# Patient Record
Sex: MALE | Race: WHITE
[De-identification: names, ages, dates, MRNs, and addresses within clinical notes are randomized per-mention and may not be internally consistent; named-entity substitution may affect disease eponyms.]

---

## 2018-06-06 ENCOUNTER — HOSPITAL ENCOUNTER (EMERGENCY)
Dept: HOSPITAL 97 - ER | Age: 53
Discharge: HOME | End: 2018-06-06
Payer: COMMERCIAL

## 2018-06-06 DIAGNOSIS — F17.210: ICD-10-CM

## 2018-06-06 DIAGNOSIS — M32.9: Primary | ICD-10-CM

## 2018-06-06 PROCEDURE — 99283 EMERGENCY DEPT VISIT LOW MDM: CPT

## 2018-06-06 NOTE — ER
Nurse's Notes                                                                                     

 [facility]                                                                

Name: Mack Ramírez                                                                                    

Age: 53 yrs                                                                                       

Sex: Male                                                                                         

: 1965                                                                                   

MRN: [id number]                                                                                   

Arrival Date: 2018                                                                          

Time: 12:21                                                                                       

Account#: [id number]                                                                            

Bed 26                                                                                            

Private MD: None, None                                                                            

Diagnosis: Lupus erythematosus                                                                    

                                                                                                  

Presentation:                                                                                     

                                                                                             

12:34 Presenting complaint: Patient states: Pain all over body with patches of dry skin to    aj  

      face and ears. Patient reports that he has been off of his RX steroids for lupus and is     

      having trouble getting into rheumatologist. Would also like a second opinion on             

      previously injured right upper arm and shoulder. Transition of care: patient was not        

      received from another setting of care. Onset of symptoms was May 29, 2018. Risk             

      Assessment: Do you want to hurt yourself or someone else? Patient reports no desire to      

      harm self or others. Care prior to arrival: None.                                           

12:34 Method Of Arrival: Ambulatory                                                             

12:34 Acuity: VERN 4                                                                           aj  

13:30 Initial Sepsis Screen: Does the patient meet any 2 criteria? No. Patient's initial      kr2 

      sepsis screen is negative. Does the patient have a suspected source of infection? No.       

      Patient's initial sepsis screen is negative.                                                

                                                                                                  

Triage Assessment:                                                                                

12:36 General: Appears in no apparent distress. comfortable, Behavior is calm, cooperative,   aj  

      appropriate for age. Pain:. Pain: Complains of pain in buttocks, pelvis, right leg and      

      left leg. Neuro: Level of Consciousness is awake, alert, obeys commands, Oriented to        

      person, place, time, situation, Appropriate for age. Respiratory: Airway is patent          

      Respiratory effort is even, unlabored, Respiratory pattern is regular, symmetrical.         

      Derm: Skin is pink, warm \T\ dry. normal, Rash noted that is macular, on face.              

                                                                                                  

Historical:                                                                                       

- Allergies:                                                                                      

12:36 No Known Allergies;                                                                     aj  

- Home Meds:                                                                                      

12:36 None [Active];                                                                          aj  

- PMHx:                                                                                           

12:36 Lupus;                                                                                  aj  

- PSHx:                                                                                           

12:36 RIGHT eye sx;                                                                           aj  

                                                                                                  

- Immunization history:: Adult Immunizations up to date.                                          

- Social history:: Smoking status: Patient uses tobacco products, smokes one-half pack            

  cigarettes per day.                                                                             

- Ebola Screening: : Patient negative for fever greater than or equal to 101.5 degrees            

  Fahrenheit, and additional compatible Ebola Virus Disease symptoms Patient denies               

  exposure to infectious person Patient denies travel to an Ebola-affected area in the            

  21 days before illness onset No symptoms or risks identified at this time.                      

- Family history:: not pertinent.                                                                 

- Hospitalizations: : No recent hospitalization is reported.                                      

                                                                                                  

                                                                                                  

Screenin:30 Abuse screen: Denies threats or abuse. Denies injuries from another. Nutritional        kr2 

      screening: No deficits noted. Tuberculosis screening: No symptoms or risk factors           

      identified. Fall Risk None identified.                                                      

                                                                                                  

Assessment:                                                                                       

13:30 General: Appears in no apparent distress. uncomfortable, well groomed, well developed,  kr2 

      well nourished, Behavior is calm, cooperative, appropriate for age. Pain: Complains of      

      pain in entire body Pain currently is 8 out of 10 on a pain scale. Quality of pain is       

      described as aching, Is continuous, Alleviated by nothing. Aggravated by increased          

      activity. Neuro: Level of Consciousness is awake, alert, obeys commands, Oriented to        

      person, place, time, situation, Appropriate for age Intact. Cardiovascular: Capillary       

      refill < 3 seconds in bilateral fingers Patient's skin is warm and dry. Respiratory:        

      Airway is patent Respiratory effort is even, unlabored, Respiratory pattern is regular,     

      symmetrical. GI: Abdomen is flat, non-distended. : No signs and/or symptoms were          

      reported regarding the genitourinary system. EENT: Oral mucosa is moist. Derm: Skin is      

      intact, is healthy with good turgor, Skin is pink, warm \T\ dry. Musculoskeletal:           

      Circulation, motion, and sensation intact. Range of motion: limited in right shoulder.      

                                                                                                  

Vital Signs:                                                                                      

12:36  / 93; Pulse 68; Resp 16; Temp 97.4; Pulse Ox 97% on R/A; Weight 106.59 kg;       aj  

      Height 6 ft. 1 in. (185.42 cm);                                                             

12:36 Body Mass Index 31.00 (106.59 kg, 185.42 cm)                                            aj  

                                                                                                  

ED Course:                                                                                        

12:21 Patient arrived in ED.                                                                  sb2 

12:22 None, None is Private Physician.                                                        sb2 

12:36 Triage completed.                                                                       aj  

12:36 Arm band placed on left wrist. Patient placed in waiting room, Patient notified of wait aj  

      time.                                                                                       

13:28 Sharri Phoenix, RODDY is Primary Nurse.                                                     kr2 

13:28 Rome Zaidi MD is Attending Physician.                                                rn  

13:30 Patient has correct armband on for positive identification. Bed in low position. Call   kr2 

      light in reach. Side rails up X 1. Pulse ox on. NIBP on. Door closed. Head of bed           

      elevated.                                                                                   

14:00 No provider procedures requiring assistance completed. Patient did not have IV access   kr2 

      during this emergency room visit.                                                           

                                                                                                  

Administered Medications:                                                                         

No medications were administered                                                                  

                                                                                                  

                                                                                                  

Outcome:                                                                                          

13:49 Discharge ordered by MD.                                                                rn  

14:00 Discharged to home ambulatory.                                                          kr2 

14:00 Condition: good                                                                             

14:00 Discharge instructions given to patient, Instructed on discharge instructions, follow       

      up and referral plans. medication usage, Demonstrated understanding of instructions,        

      follow-up care, medications, Prescriptions given X 2.                                       

14:13 Patient left the ED.                                                                    kr2 

                                                                                                  

Signatures:                                                                                       

Brianne Perez RN                       Rome John MD MD rn Reaves, Karey, RN                       RN   kr2                                                  

Teri Finley                               sb2                                                  

                                                                                                  

**************************************************************************************************

## 2018-06-06 NOTE — EDPHYS
Physician Documentation                                                                           

 [facility]                                                                

Name: Mack Ramírez                                                                                    

Age: 53 yrs                                                                                       

Sex: Male                                                                                         

: 1965                                                                                   

MRN: [id number]                                                                                   

Arrival Date: 2018                                                                          

Time: 12:21                                                                                       

Account#: [id number]                                                                            

Bed 26                                                                                            

Private MD: None, None                                                                            

ED Physician Rome Zaidi                                                                         

HPI:                                                                                              

                                                                                             

13:45 This 53 yrs old  Male presents to ER via Ambulatory with complaints of Pain    rn  

      All Over.                                                                                   

13:45 Reports recently ran out of his steroids for his lupus, broke out in his typical lupus  rn  

      rash recently as well as pain in all joints, requesting refill of prednisone. No other      

      acute complaints. . Onset: The symptoms/episode began/occurred at an unknown time.          

      Severity of symptoms: At their worst the symptoms were mild in the emergency department     

      the symptoms are unchanged. The patient has experienced similar episodes in the past.       

      The patient has not recently seen a physician.                                              

                                                                                                  

Historical:                                                                                       

- Allergies:                                                                                      

12:36 No Known Allergies;                                                                     aj  

- Home Meds:                                                                                      

12:36 None [Active];                                                                          aj  

- PMHx:                                                                                           

12:36 Lupus;                                                                                  aj  

- PSHx:                                                                                           

12:36 RIGHT eye sx;                                                                           aj  

                                                                                                  

- Immunization history:: Adult Immunizations up to date.                                          

- Social history:: Smoking status: Patient uses tobacco products, smokes one-half pack            

  cigarettes per day.                                                                             

- Ebola Screening: : Patient negative for fever greater than or equal to 101.5 degrees            

  Fahrenheit, and additional compatible Ebola Virus Disease symptoms Patient denies               

  exposure to infectious person Patient denies travel to an Ebola-affected area in the            

  21 days before illness onset No symptoms or risks identified at this time.                      

- Family history:: not pertinent.                                                                 

- Hospitalizations: : No recent hospitalization is reported.                                      

                                                                                                  

                                                                                                  

ROS:                                                                                              

13:47 Constitutional: Negative for fever, chills, and weight loss, Eyes: Negative for injury, rn  

      pain, redness, and discharge, Neck: Negative for injury, pain, and swelling,                

      Cardiovascular: Negative for chest pain, palpitations, and edema, Respiratory: Negative     

      for shortness of breath, cough, wheezing, and pleuritic chest pain, Abdomen/GI:             

      Negative for abdominal pain, nausea, vomiting, diarrhea, and constipation,                  

      MS/Extremity: generalized joint aches Skin: + generalized facial and truncal rash           

                                                                                                  

Exam:                                                                                             

13:47 Constitutional:  This is a well developed, well nourished patient who is awake, alert,  rn  

      and in no acute distress. Head/Face:  Normocephalic, atraumatic. Skin:  generalized         

      patchy dry rash over face/neck/torso with base of erythema, no fluctuance, no signs of      

      cellulitis MS/ Extremity:  Pulses equal, no cyanosis.  Neurovascular intact.  Full,         

      normal range of motion.  Equal circumference.                                               

                                                                                                  

Vital Signs:                                                                                      

12:36  / 93; Pulse 68; Resp 16; Temp 97.4; Pulse Ox 97% on R/A; Weight 106.59 kg;       aj  

      Height 6 ft. 1 in. (185.42 cm);                                                             

12:36 Body Mass Index 31.00 (106.59 kg, 185.42 cm)                                            aj  

                                                                                                  

MDM:                                                                                              

13:28 Patient medically screened.                                                             rn  

13:47 Differential Diagnosis lupus flare. Data reviewed: vital signs, nurses notes, and as a  rn  

      result, I will discharge patient. Counseling: I had a detailed discussion with the          

      patient and/or guardian regarding: the historical points, exam findings, and any            

      diagnostic results supporting the discharge/admit diagnosis, the need for outpatient        

      follow up, to return to the emergency department if symptoms worsen or persist or if        

      there are any questions or concerns that arise at home. Special discussion: I discussed     

      with the patient/guardian in detail that at this point there is no indication for           

      admission to the hospital. It is understood, however, that if the symptoms persist or       

      worsen the patient needs to return immediately for re-evaluation. Based on the history      

      and exam findings, there is no indication for further emergent testing or inpatient         

      evaluation. I discussed with the patient/guardian the need to see the rheumatologist        

      for further evaluation of the symptoms.                                                     

                                                                                                  

Administered Medications:                                                                         

No medications were administered                                                                  

                                                                                                  

                                                                                                  

Disposition:                                                                                      

18 13:49 Discharged to Home. Impression: Lupus erythematosus.                               

- Condition is Stable.                                                                            

                                                                                                  

- Prescriptions for prednisone 10 mg Oral tablet - take 1 tablet by ORAL route once               

  daily; 60 tablet. Triamcinolone Acetonide 0.1 % Topical Ointment - apply 1                      

  application by TOPICAL route every 12 hours As needed; 1 tube.                                  

- Medication Reconciliation Form, Thank You Letter, Antibiotic Education, Prescription            

  Opioid Use form.                                                                                

- Follow up: Private Physician; When: As needed; Reason: Recheck today's complaints,              

  Re-evaluation by your physician.                                                                

- Problem is an ongoing problem.                                                                  

- Symptoms are unchanged.                                                                         

                                                                                                  

                                                                                                  

                                                                                                  

Signatures:                                                                                       

Brianne Perez RN RN aj Nieto, Roman, MD MD rn Reaves, Sharri, RN                       RN   kr2                                                  

                                                                                                  

Corrections: (The following items were deleted from the chart)                                    

14:13 13:49 2018 13:49 Discharged to Home. Impression: Lupus erythematosus. Condition   kr2 

      is Stable. Prescriptions for prednisone 10 mg Oral tablet - take 1 tablet by ORAL route     

      once daily; 60 tablet, Triamcinolone Acetonide 0.1 % Topical Ointment - apply 1             

      application by TOPICAL route every 12 hours As needed; 1 tube. and Forms are Medication     

      Reconciliation Form, Thank You Letter, Antibiotic Education, Prescription Opioid Use.       

      Follow up: Private Physician; When: As needed; Reason: Recheck today's complaints,          

      Re-evaluation by your physician. Problem is an ongoing problem. Symptoms are unchanged.     

      rn                                                                                          

                                                                                                  

**************************************************************************************************

## 2018-08-14 ENCOUNTER — HOSPITAL ENCOUNTER (EMERGENCY)
Dept: HOSPITAL 97 - ER | Age: 53
Discharge: HOME | End: 2018-08-14
Payer: SELF-PAY

## 2018-08-14 DIAGNOSIS — F17.210: ICD-10-CM

## 2018-08-14 DIAGNOSIS — Z76.0: Primary | ICD-10-CM

## 2018-08-14 PROCEDURE — 99282 EMERGENCY DEPT VISIT SF MDM: CPT

## 2018-08-14 NOTE — ER
Nurse's Notes                                                                                     

 [facility]                                                                

Name: Mack Ramírez                                                                                    

Age: 53 yrs                                                                                       

Sex: Male                                                                                         

: 1965                                                                                   

MRN: [id number]                                                                                   

Arrival Date: 2018                                                                          

Time: 08:34                                                                                       

Account#: [id number]                                                                            

Bed 19                                                                                            

Private MD: None, None                                                                            

Diagnosis: Encounter for issue of repeat prescription                                             

                                                                                                  

Presentation:                                                                                     

                                                                                             

08:40 Presenting complaint: Patient states: I HAVE LUPUS, NO PCP, AND I CANNOT AFFORD TO SEE    

      MY DOCTOR. I HAVE BEEN OUT OF PREDNISONE FOR THE PAST 4 DAYS. I TAKE 10 MG ONCE DAILY.      

      Transition of care: patient was not received from another setting of care. Onset of         

      symptoms was August 10, 2018. Risk Assessment: Do you want to hurt yourself or someone      

      else? Patient reports no desire to harm self or others. Initial Sepsis Screen: Does the     

      patient meet any 2 criteria? No. Patient's initial sepsis screen is negative. Does the      

      patient have a suspected source of infection? No. Patient's initial sepsis screen is        

      negative. Care prior to arrival: None.                                                      

08:40 Method Of Arrival: Ambulatory                                                             

08:40 Acuity: VERN 5                                                                             

                                                                                                  

Triage Assessment:                                                                                

08:45 General: Appears in no apparent distress. comfortable, Behavior is calm, cooperative,   ch  

      appropriate for age. Pain: Pain began PT HAS CHRONIC PAIN ISSUES, NO NEW OR ACUTE PAIN.     

      Neuro: No deficits noted. Respiratory: No deficits noted.                                   

                                                                                                  

Historical:                                                                                       

- Allergies:                                                                                      

08:45 No Known Allergies;                                                                     ch  

- Home Meds:                                                                                      

08:45 prednisone 10 mg Oral tab once daily [Active];                                            

- PMHx:                                                                                           

08:45 Lupus; LOW BACK PAIN; r ARM FX-LOCKED R SHOULDER;                                         

- PSHx:                                                                                           

08:45 None;                                                                                     

                                                                                                  

- Immunization history:: Adult Immunizations up to date.                                          

- Social history:: Smoking status: Patient uses tobacco products, smokes one-half pack            

  cigarettes per day.                                                                             

- Ebola Screening: : Patient negative for fever greater than or equal to 101.5 degrees            

  Fahrenheit, and additional compatible Ebola Virus Disease symptoms Patient denies               

  exposure to infectious person Patient denies travel to an Ebola-affected area in the            

  21 days before illness onset No symptoms or risks identified at this time.                      

                                                                                                  

                                                                                                  

Screenin:46 Abuse screen: Denies threats or abuse. Denies injuries from another. Nutritional        ch  

      screening: No deficits noted. Tuberculosis screening: No symptoms or risk factors           

      identified. Fall Risk None identified.                                                      

                                                                                                  

Assessment:                                                                                       

08:46 Reassessment: Patient appears in no apparent distress at this time. Patient and/or      ch  

      family updated on plan of care and expected duration. Pain level reassessed. Patient is     

      alert, oriented x 3, equal unlabored respirations, skin warm/dry/pink.                      

                                                                                                  

Vital Signs:                                                                                      

08:45  / 105; Pulse 73; Resp 15; Temp 98.3; Pulse Ox 99% on R/A; Weight 108.86 kg;      ch  

      Height 6 ft. 1 in. (185.42 cm);                                                             

08:45 Body Mass Index 31.66 (108.86 kg, 185.42 cm)                                              

                                                                                                  

ED Course:                                                                                        

08:34 Patient arrived in ED.                                                                  mr  

08:34 None, None is Private Physician.                                                        mr  

08:36 Mitch Bello MD is Attending Physician.                                             [facility] 

08:36 Dolores Muse FNP-C is PHCP.                                                        kb  

08:39 Dolores Muse FNP-C is PHCP.                                                        sg  

08:40 Mitch Bello MD is Attending Physician.                                               

08:40 Tri Song, RN is Primary Nurse.                                                  

08:44 Triage completed.                                                                         

08:45 Arm band placed on left wrist. Patient placed in an exam room, on a stretcher.          ch  

08:46 No apparent distress.                                                                     

08:46 Patient has correct armband on for positive identification.                               

08:46 No provider procedures requiring assistance completed. Patient did not have IV access   ch  

      during this emergency room visit.                                                           

                                                                                                  

Administered Medications:                                                                         

No medications were administered                                                                  

                                                                                                  

                                                                                                  

Outcome:                                                                                          

08:43 Discharge ordered by MD.                                                                kb  

08:46 Discharged to home ambulatory.                                                            

08:46 Condition: stable                                                                           

08:46 Discharge instructions given to patient, Instructed on discharge instructions, follow       

      up and referral plans. medication usage, Demonstrated understanding of instructions,        

      follow-up care, medications, Prescriptions given X 1.                                       

08:50 Patient left the ED.                                                                      

                                                                                                  

Signatures:                                                                                       

Dolores Muse FNP-C FNP-Ckb Hammond, Christina RN                  RN                                                      

Rachid Victoria RN                         RN   Mitch Hernandez MD MD cha Rivera, Maria                                mr                                                   

                                                                                                  

**************************************************************************************************

## 2018-08-14 NOTE — EDPHYS
Physician Documentation                                                                           

 [facility]                                                                

Name: Mack Ramírez                                                                                    

Age: 53 yrs                                                                                       

Sex: Male                                                                                         

: 1965                                                                                   

MRN: [id number]                                                                                   

Arrival Date: 2018                                                                          

Time: 08:34                                                                                       

Account#: [id number]                                                                            

Bed 19                                                                                            

Private MD: None, None                                                                            

ED Physician Mitch Bello                                                                      

HPI:                                                                                              

                                                                                             

08:45 This 53 yrs old  Male presents to ER via Ambulatory with complaints of         kb  

      Medication Refill.                                                                          

08:45 The patient presents to the emergency department requesting refill(s) for: prednisone.  kb  

      The patient chronically suffers from Lupus. The patient has experienced similar             

      episodes in the past. The patient has not recently seen a physician. Pt states he has       

      been on prednisone 10mg daily for 6 years. Has been out for 4 days and needs a refill.      

      Cannot get in to see rheumatologist until October. Reports rash and joint pain that         

      normally happen when he doesn't take the prednisone. .                                      

                                                                                                  

Historical:                                                                                       

- Allergies:                                                                                      

08:45 No Known Allergies;                                                                     ch  

- Home Meds:                                                                                      

08:45 prednisone 10 mg Oral tab once daily [Active];                                          ch  

- PMHx:                                                                                           

08:45 Lupus; LOW BACK PAIN; r ARM FX-LOCKED R SHOULDER;                                       ch  

- PSHx:                                                                                           

08:45 None;                                                                                   ch  

                                                                                                  

- Immunization history:: Adult Immunizations up to date.                                          

- Social history:: Smoking status: Patient uses tobacco products, smokes one-half pack            

  cigarettes per day.                                                                             

- Ebola Screening: : Patient negative for fever greater than or equal to 101.5 degrees            

  Fahrenheit, and additional compatible Ebola Virus Disease symptoms Patient denies               

  exposure to infectious person Patient denies travel to an Ebola-affected area in the            

  21 days before illness onset No symptoms or risks identified at this time.                      

                                                                                                  

                                                                                                  

ROS:                                                                                              

08:45 Constitutional: Negative for fever, chills, and weight loss, Cardiovascular: Negative   kb  

      for chest pain, palpitations, and edema, Respiratory: Negative for shortness of breath,     

      cough, wheezing, and pleuritic chest pain, Abdomen/GI: Negative for abdominal pain,         

      nausea, vomiting, diarrhea, and constipation, Back: Negative for injury and pain,           

      Neuro: Negative for headache, weakness, numbness, tingling, and seizure.                    

08:45 MS/extremity: Positive for pain, all joints.                                                

08:45 Skin: Positive for rash, diffusely.                                                         

                                                                                                  

Exam:                                                                                             

08:45 Constitutional:  This is a well developed, well nourished patient who is awake, alert,  kb  

      and in no acute distress. Head/Face:  Normocephalic, atraumatic. Chest/axilla:  Normal      

      chest wall appearance and motion.  Nontender with no deformity.  No lesions are             

      appreciated. Cardiovascular:  Regular rate and rhythm with a normal S1 and S2.  No          

      gallops, murmurs, or rubs.  Normal PMI, no JVD.  No pulse deficits. Respiratory:  Lungs     

      have equal breath sounds bilaterally, clear to auscultation and percussion.  No rales,      

      rhonchi or wheezes noted.  No increased work of breathing, no retractions or nasal          

      flaring. Abdomen/GI:  Soft, non-tender, with normal bowel sounds.  No distension or         

      tympany.  No guarding or rebound.  No evidence of tenderness throughout. MS/ Extremity:     

       Pulses equal, no cyanosis.  Neurovascular intact.  Full, normal range of motion.           

      Neuro:  Awake and alert, GCS 15, oriented to person, place, time, and situation.            

      Cranial nerves II-XII grossly intact.  Motor strength 5/5 in all extremities.  Sensory      

      grossly intact.  Cerebellar exam normal.  Normal gait.                                      

08:45 Skin: rash a moderate rash is noted, rash can be described as erythematous, scaly, and      

      is diffusely located.                                                                       

                                                                                                  

Vital Signs:                                                                                      

08:45  / 105; Pulse 73; Resp 15; Temp 98.3; Pulse Ox 99% on R/A; Weight 108.86 kg;      ch  

      Height 6 ft. 1 in. (185.42 cm);                                                             

08:45 Body Mass Index 31.66 (108.86 kg, 185.42 cm)                                            ch  

                                                                                                  

MDM:                                                                                              

08:36 Patient medically screened.                                                             [facility] 

08:49 Data reviewed: vital signs, nurses notes. Data interpreted: Pulse oximetry: on room air kb  

      is 99 %. Interpretation: normal. Counseling: I had a detailed discussion with the           

      patient and/or guardian regarding: the historical points, exam findings, and any            

      diagnostic results supporting the discharge/admit diagnosis, the need for outpatient        

      follow up, a family practitioner, a rheumatologist, to return to the emergency              

      department if symptoms worsen or persist or if there are any questions or concerns that     

      arise at home.                                                                              

                                                                                                  

Administered Medications:                                                                         

No medications were administered                                                                  

                                                                                                  

                                                                                                  

Disposition:                                                                                      

11:42 Co-signature as Attending Physician, Mitch Bello MD I agree with the assessment and  donnell 

      plan of care.                                                                               

                                                                                                  

Disposition:                                                                                      

18 08:43 Discharged to Home. Impression: Encounter for issue of repeat prescription.        

- Condition is Stable.                                                                            

- Discharge Instructions: Medicine Refill at the Emergency Department.                            

- Prescriptions for prednisone 10 mg Oral tablet - take 1 tablet by ORAL route once               

  daily; 30 tablet.                                                                               

- Medication Reconciliation Form, Thank You Letter, Antibiotic Education, Prescription            

  Opioid Use form.                                                                                

- Follow up: Emergency Department; When: As needed; Reason: Worsening of condition.               

  Follow up: Private Physician; When: 2 - 3 days; Reason: Recheck today's complaints,             

  Continuance of care, Re-evaluation by your physician.                                           

                                                                                                  

                                                                                                  

                                                                                                  

Signatures:                                                                                       

Dolores Muse, ILEANA-C                 ILEANA-Tri Flores RN RN ch Anderson, Corey, MD MD cha                                                  

                                                                                                  

Corrections: (The following items were deleted from the chart)                                    

08:50 08:43 2018 08:43 Discharged to Home. Impression: Encounter for issue of repeat    ch  

      prescription. Condition is Stable. Forms are Medication Reconciliation Form, Thank You      

      Letter, Antibiotic Education, Prescription Opioid Use. Follow up: Emergency Department;     

      When: As needed; Reason: Worsening of condition. Follow up: Private Physician; When: 2      

      - 3 days; Reason: Recheck today's complaints, Continuance of care, Re-evaluation by         

      your physician. kb                                                                          

                                                                                                  

**************************************************************************************************

## 2018-10-16 ENCOUNTER — HOSPITAL ENCOUNTER (EMERGENCY)
Dept: HOSPITAL 97 - ER | Age: 53
Discharge: HOME | End: 2018-10-16
Payer: SELF-PAY

## 2018-10-16 DIAGNOSIS — K42.9: Primary | ICD-10-CM

## 2018-10-16 DIAGNOSIS — F17.210: ICD-10-CM

## 2018-10-16 PROCEDURE — 99281 EMR DPT VST MAYX REQ PHY/QHP: CPT

## 2018-10-16 NOTE — EDPHYS
Physician Documentation                                                                           

 [facility]                                                                

Name: Mack Ramírez                                                                                    

Age: 53 yrs                                                                                       

Sex: Male                                                                                         

: 1965                                                                                   

MRN: [id number]                                                                                   

Arrival Date: 10/16/2018                                                                          

Time: 09:53                                                                                       

Account#: [id number]                                                                            

Bed 24                                                                                            

Private MD:                                                                                       

ED Physician Rome Zaidi                                                                         

HPI:                                                                                              

10/16                                                                                             

10:31 This 53 yrs old  Male presents to ER via Ambulatory with complaints of         rn  

      Abdominal Pain - Hernia Enlarged.                                                           

10:31 The patient presents with enlarged hernia. Onset: The symptoms/episode began/occurred   rn  

      at an unknown time. The symptoms do not radiate. Associated signs and symptoms:             

      Pertinent negatives: nausea and vomiting, anorexia, blood in stools, chest pain,            

      diarrhea, dysuria, fever, hematuria, vomiting. The symptoms are described as achy.          

      Modifying factors: The symptoms are alleviated by nothing, the symptoms are aggravated      

      by nothing. Severity of pain: At its worst the pain was very mild in the emergency          

      department the pain is unchanged. The patient has experienced similar episodes in the       

      past. Reports noticed in last few weeks that existing hernia has enlarged, reports          

      noticing enlargement with straining and lifting, intermittent, no bowel complaints, no      

      bloody bowel movements, no current abd pain, states hernia currently pushed in, came in     

      at recommendation of his  because is in middle of lawsuit since got hernia while      

      at work. No vomiting. .                                                                     

                                                                                                  

Historical:                                                                                       

- Allergies:                                                                                      

10: No Known Allergies;                                                                     ch  

- Home Meds:                                                                                      

10:01 prednisone 10 mg Oral tab once daily [Active];                                          ch  

- PMHx:                                                                                           

10:01 Lupus; low back pain; r ARM FX-LOCKED R SHOULDER; Umbilical hernia;                       

- PSHx:                                                                                           

10:01 eye;                                                                                    ch  

                                                                                                  

- Immunization history:: Adult Immunizations up to date, Flu vaccine is not up to date.           

- Social history:: Smoking status: Patient uses tobacco products, smokes one pack                 

  cigarettes per day.                                                                             

- Ebola Screening: : Patient negative for fever greater than or equal to 101.5 degrees            

  Fahrenheit, and additional compatible Ebola Virus Disease symptoms Patient denies               

  exposure to infectious person Patient denies travel to an Ebola-affected area in the            

  21 days before illness onset No symptoms or risks identified at this time.                      

- Family history:: not pertinent.                                                                 

- Hospitalizations: : No recent hospitalization is reported.                                      

                                                                                                  

                                                                                                  

ROS:                                                                                              

10:31 Constitutional: Negative for fever, chills, and weight loss, Eyes: Negative for injury, rn  

      pain, redness, and discharge, Cardiovascular: Negative for chest pain, palpitations,        

      and edema, Respiratory: Negative for shortness of breath, cough, wheezing, and              

      pleuritic chest pain, Abdomen/GI: Negative for nausea, vomiting, diarrhea, and              

      constipation, Back: Negative for injury and pain, MS/Extremity: Negative for injury and     

      deformity, Skin: Negative for injury, rash, and discoloration, Neuro: Negative for          

      headache, weakness, numbness, tingling, and seizure.                                        

                                                                                                  

Exam:                                                                                             

10:31 Constitutional:  This is a well developed, well nourished patient who is awake, alert,  rn  

      and in no acute distress. Walked to room without difficulty. Abdomen/GI:  soft,             

      non-tender, + easily reducible small renate-umbilical hernia, no skin changes, no             

      rebound/guarding.                                                                           

                                                                                                  

Vital Signs:                                                                                      

10:01  / 98; Pulse 61; Resp 14; Temp 97.4; Pulse Ox 99% on R/A; Weight 102.06 kg;       ch  

      Height 6 ft. 2 in. (187.96 cm); Pain 210;                                                  

10:01 Body Mass Index 28.89 (102.06 kg, 187.96 cm)                                            ch  

                                                                                                  

MDM:                                                                                              

10:04 Patient medically screened.                                                             rn  

10:31 Differential diagnosis: hernia. Data reviewed: vital signs, nurses notes, and as a      rn  

      result, I will discharge patient. Counseling: I had a detailed discussion with the          

      patient and/or guardian regarding: the historical points, exam findings, and any            

      diagnostic results supporting the discharge/admit diagnosis, the need for outpatient        

      follow up, to return to the emergency department if symptoms worsen or persist or if        

      there are any questions or concerns that arise at home. Special discussion: I discussed     

      with the patient/guardian in detail that at this point there is no indication for           

      admission to the hospital. It is understood, however, that if the symptoms persist or       

      worsen the patient needs to return immediately for re-evaluation. Based on the history      

      and exam findings, there is no indication for further emergent testing or inpatient         

      evaluation. I discussed with the patient/guardian the need to see the general surgeon       

      for further evaluation of the symptoms. ED course: Pt with uncomplicated reducible          

      hernia, no signs/symptoms of obstruction/strangulation/incarceration, will dc home with     

      surgery f/u as needed. .                                                                    

                                                                                                  

Administered Medications:                                                                         

No medications were administered                                                                  

                                                                                                  

                                                                                                  

Disposition:                                                                                      

10/16/18 10:41 Discharged to Home. Impression: Umbilical hernia without obstruction or            

  gangrene.                                                                                       

- Condition is Stable.                                                                            

- Discharge Instructions: Hernia, Adult.                                                          

                                                                                                  

- Medication Reconciliation Form, Thank You Letter, Antibiotic Education, Prescription            

  Opioid Use form.                                                                                

- Follow up: Private Physician; When: As needed; Reason: Recheck today's complaints,              

  Re-evaluation by your physician.                                                                

- Problem is chronic.                                                                             

- Symptoms have improved.                                                                         

                                                                                                  

                                                                                                  

                                                                                                  

Signatures:                                                                                       

Tri Song RN RN                                                      

Rome Zaidi MD MD rn Baxter, Heather, RN RN                                                      

                                                                                                  

Corrections: (The following items were deleted from the chart)                                    

10:46 10:41 10/16/2018 10:41 Discharged to Home. Impression: Umbilical hernia without         hb  

      obstruction or gangrene. Condition is Stable. Forms are Medication Reconciliation Form,     

      Thank You Letter, Antibiotic Education, Prescription Opioid Use. Follow up: Private         

      Physician; When: As needed; Reason: Recheck today's complaints, Re-evaluation by your       

      physician. Problem is chronic. Symptoms have improved. rn                                   

                                                                                                  

**************************************************************************************************

## 2018-10-16 NOTE — ER
Nurse's Notes                                                                                     

 [facility]                                                                

Name: Mack Ramírez                                                                                    

Age: 53 yrs                                                                                       

Sex: Male                                                                                         

: 1965                                                                                   

MRN: [id number]                                                                                   

Arrival Date: 10/16/2018                                                                          

Time: 09:53                                                                                       

Account#: [id number]                                                                            

Bed 24                                                                                            

Private MD:                                                                                       

Diagnosis: Umbilical hernia without obstruction or gangrene                                       

                                                                                                  

Presentation:                                                                                     

10/16                                                                                             

09:58 Presenting complaint: Patient states: I got a hernia years ago at work but they didn't  ch  

      treat it so I hired a . since  it has gotten larger, and I feel burning in      

      my L upper abdomen. When I initially did this, the sent me to [employer]. The          

      company was supposed to settle with me on Thursday so I could go get surgery on it. My      

      concern is I do lots of heavy lifting, am I safe to work? Do i need to be on light          

      Duty? Can they stitch it up because its small. Transition of care: patient was not          

      received from another setting of care. Onset of symptoms was 2018. Risk         

      Assessment: Do you want to hurt yourself or someone else? Patient reports no desire to      

      harm self or others. Initial Sepsis Screen: Does the patient meet any 2 criteria? No.       

      Patient's initial sepsis screen is negative. Does the patient have a suspected source       

      of infection? No. Patient's initial sepsis screen is negative. Care prior to arrival:       

      None.                                                                                       

09:58 Method Of Arrival: Ambulatory                                                             

09:58 Acuity: VERN 3                                                                             

                                                                                                  

Triage Assessment:                                                                                

10:01 General: Appears in no apparent distress. uncomfortable, Behavior is calm, cooperative, ch  

      appropriate for age. Pain: Complains of pain in umbilical area and left upper quadrant      

      Pain currently is 2 out of 10 on a pain scale.                                              

                                                                                                  

Historical:                                                                                       

- Allergies:                                                                                      

10: No Known Allergies;                                                                     ch  

- Home Meds:                                                                                      

10: prednisone 10 mg Oral tab once daily [Active];                                            

- PMHx:                                                                                           

10: Lupus; low back pain; r ARM FX-LOCKED R SHOULDER; Umbilical hernia;                       

- PSHx:                                                                                           

10:01 eye;                                                                                      

                                                                                                  

- Immunization history:: Adult Immunizations up to date, Flu vaccine is not up to date.           

- Social history:: Smoking status: Patient uses tobacco products, smokes one pack                 

  cigarettes per day.                                                                             

- Ebola Screening: : Patient negative for fever greater than or equal to 101.5 degrees            

  Fahrenheit, and additional compatible Ebola Virus Disease symptoms Patient denies               

  exposure to infectious person Patient denies travel to an Ebola-affected area in the            

  21 days before illness onset No symptoms or risks identified at this time.                      

- Family history:: not pertinent.                                                                 

- Hospitalizations: : No recent hospitalization is reported.                                      

                                                                                                  

                                                                                                  

Screening:                                                                                        

10:12 Abuse screen: Denies threats or abuse. Denies injuries from another. Nutritional        aj1 

      screening: No deficits noted. Tuberculosis screening: No symptoms or risk factors           

      identified. Fall Risk None identified.                                                      

                                                                                                  

Assessment:                                                                                       

10:12 General: Appears in no apparent distress. comfortable, Behavior is calm, cooperative,   aj1 

      appropriate for age. Pain: Complains of pain in umbilical area Pain does not radiate.       

      Pain currently is 2 out of 10 on a pain scale. Quality of pain is described as burning.     

      Neuro: Level of Consciousness is awake, alert, obeys commands. Cardiovascular:              

      Patient's skin is warm and dry. Respiratory: Airway is patent Respiratory effort is         

      even, unlabored, Respiratory pattern is regular, symmetrical. GI: Abdomen is round          

      Bowel sounds present X 4 quads. Abd is soft and non tender X 4 quads. Reports normal        

      bowel habits, Patient currently denies bloody stool. : No signs and/or symptoms were      

      reported regarding the genitourinary system. EENT: No signs and/or symptoms were            

      reported regarding the EENT system. Derm: No signs and/or symptoms reported regarding       

      the dermatologic system. Skin is pink, warm \T\ dry. normal. Musculoskeletal: No signs      

      and/or symptoms reported regarding the musculoskeletal system. Circulation, motion, and     

      sensation intact.                                                                           

                                                                                                  

Vital Signs:                                                                                      

10:01  / 98; Pulse 61; Resp 14; Temp 97.4; Pulse Ox 99% on R/A; Weight 102.06 kg;       ch  

      Height 6 ft. 2 in. (187.96 cm); Pain 2/10;                                                  

10:01 Body Mass Index 28.89 (102.06 kg, 187.96 cm)                                              

                                                                                                  

ED Course:                                                                                        

09:53 Patient arrived in ED.                                                                  as  

10:00 Triage completed.                                                                         

10:01 Arm band placed on left wrist. Patient placed in an exam room, on a stretcher.          ch  

10:03 Valerie Casper RN is Primary Nurse.                                                   aj1 

10:04 Rome Zaidi MD is Attending Physician.                                                rn  

10:05 Mitch Caro PA is PHCP.                                                                cp  

10:12 Patient has correct armband on for positive identification. Bed in low position. Call   aj1 

      light in reach. Side rails up X 1.                                                          

10:12 No provider procedures requiring assistance completed.                                  aj1 

10:46 Patient did not have IV access during this emergency room visit.                        hb  

                                                                                                  

Administered Medications:                                                                         

No medications were administered                                                                  

                                                                                                  

                                                                                                  

Outcome:                                                                                          

10:41 Discharge ordered by MD.                                                                rn  

10:46 Discharged to home ambulatory.                                                          hb  

10:46 Condition: stable                                                                           

10:46 Discharge instructions given to patient, Instructed on discharge instructions, follow       

      up and referral plans. Demonstrated understanding of instructions, follow-up care.          

10:46 Patient left the ED.                                                                    hb  

                                                                                                  

Signatures:                                                                                       

Tri Song, RN                  RN   Valerie Lundy RN                     RN   aj1                                                  

Marisa Holder Roman, MD MD rn Page, Corey, PA PA cp Baxter, Heather, RN                     RN   hb                                                   

                                                                                                  

**************************************************************************************************

## 2018-10-27 ENCOUNTER — HOSPITAL ENCOUNTER (EMERGENCY)
Dept: HOSPITAL 97 - ER | Age: 53
Discharge: HOME | End: 2018-10-27
Payer: SELF-PAY

## 2018-10-27 DIAGNOSIS — F17.210: ICD-10-CM

## 2018-10-27 DIAGNOSIS — L03.115: Primary | ICD-10-CM

## 2018-10-27 LAB
BLD SMEAR INTERP: (no result)
BUN BLD-MCNC: 12 MG/DL (ref 7–18)
GLUCOSE SERPLBLD-MCNC: 83 MG/DL (ref 74–106)
HCT VFR BLD CALC: 42.5 % (ref 39.6–49)
LYMPHOCYTES # SPEC AUTO: 0.4 K/UL (ref 0.7–4.9)
MCH RBC QN AUTO: 31.6 PG (ref 27–35)
MCV RBC: 94.8 FL (ref 80–100)
MORPHOLOGY BLD-IMP: (no result)
PMV BLD: 10.3 FL (ref 7.6–11.3)
POTASSIUM SERPL-SCNC: 4 MMOL/L (ref 3.5–5.1)
RBC # BLD: 4.48 M/UL (ref 4.33–5.43)

## 2018-10-27 PROCEDURE — 36415 COLL VENOUS BLD VENIPUNCTURE: CPT

## 2018-10-27 PROCEDURE — 96374 THER/PROPH/DIAG INJ IV PUSH: CPT

## 2018-10-27 PROCEDURE — 93971 EXTREMITY STUDY: CPT

## 2018-10-27 PROCEDURE — 87040 BLOOD CULTURE FOR BACTERIA: CPT

## 2018-10-27 PROCEDURE — 85025 COMPLETE CBC W/AUTO DIFF WBC: CPT

## 2018-10-27 PROCEDURE — 80048 BASIC METABOLIC PNL TOTAL CA: CPT

## 2018-10-27 PROCEDURE — 99284 EMERGENCY DEPT VISIT MOD MDM: CPT

## 2018-10-27 NOTE — ER
Nurse's Notes                                                                                     

 [facility]                                                                

Name: Mack Ramírez                                                                                    

Age: 53 yrs                                                                                       

Sex: Male                                                                                         

: 1965                                                                                   

MRN: [id number]                                                                                   

Arrival Date: 10/27/2018                                                                          

Time: 08:06                                                                                       

Account#: [id number]                                                                            

Bed 5                                                                                             

Private MD: None, None                                                                            

Diagnosis: Cellulitis of right lower limb                                                         

                                                                                                  

Presentation:                                                                                     

10/27                                                                                             

08:17 Presenting complaint: Patient states: BLE pain and swelling with R>L started yesterday, sv  

      pt has hx of cellulitis to extremity. c/o left neck pain. Transition of care: patient       

      was not received from another setting of care. Onset of symptoms was 2018.      

      Risk Assessment: Do you want to hurt yourself or someone else? Patient reports no           

      desire to harm self or others. Initial Sepsis Screen: Does the patient meet any 2           

      criteria? No. Patient's initial sepsis screen is negative. Does the patient have a          

      suspected source of infection? No. Patient's initial sepsis screen is negative. Care        

      prior to arrival: None.                                                                     

08:17 Method Of Arrival: Wheelchair                                                           sv  

08:17 Acuity: VERN 3                                                                           sv  

                                                                                                  

Triage Assessment:                                                                                

08:17 General: Appears in no apparent distress. uncomfortable, Behavior is calm, cooperative, sv  

      appropriate for age. Pain: Complains of pain in right leg and left leg Pain currently       

      is 6 out of 10 on a pain scale. Pain began 1 day ago. Is continuous, Aggravated by          

      increased activity, weight bearing. EENT: No signs and/or symptoms were reported            

      regarding the EENT system. Neuro: Level of Consciousness is awake, alert, obeys             

      commands, Oriented to person, place, time, situation, Moves all extremities. Full           

      function. Respiratory: Respiratory effort is even, unlabored, Respiratory pattern is        

      regular, symmetrical. Derm: Skin is normal, Redness noted to BLE. Musculoskeletal:          

      Range of motion: intact in all extremities, Swelling present in right leg and left leg.     

                                                                                                  

Historical:                                                                                       

- Allergies:                                                                                      

08: No Known Allergies;                                                                     sv  

- Home Meds:                                                                                      

: prednisone 10 mg Oral tab once daily [Active];                                          sv  

- PMHx:                                                                                           

08: low back pain; Lupus; r ARM FX-LOCKED R SHOULDER; Umbilical hernia;                     sv  

- PSHx:                                                                                           

08: eye;                                                                                    sv  

                                                                                                  

- Immunization history:: Flu vaccine is not up to date.                                           

- Social history:: Smoking status: Patient uses tobacco products, smokes one pack                 

  cigarettes per day.                                                                             

- Ebola Screening: : No symptoms or risks identified at this time.                                

                                                                                                  

                                                                                                  

Screenin:27 Abuse screen: Denies threats or abuse. Denies injuries from another. Nutritional        sv  

      screening: No deficits noted. Tuberculosis screening: No symptoms or risk factors           

      identified. Fall Risk None identified.                                                      

                                                                                                  

Assessment:                                                                                       

08:30 Reassessment: Patient appears in no apparent distress at this time. No changes from     sv  

      previously documented assessment. See triage assessment.                                    

10:37 Reassessment: Patient appears in no apparent distress at this time. No changes from     sv  

      previously documented assessment. Patient and/or family updated on plan of care and         

      expected duration. Pain level reassessed. Patient is alert, oriented x 3, equal             

      unlabored respirations, skin warm/dry/pink.                                                 

11:57 Reassessment: Patient appears in no apparent distress at this time. No changes from     sv  

      previously documented assessment. Patient and/or family updated on plan of care and         

      expected duration. Pain level reassessed. Patient is alert, oriented x 3, equal             

      unlabored respirations, skin warm/dry/pink.                                                 

                                                                                                  

Vital Signs:                                                                                      

08:26  / 92; Pulse 72; Resp 20; Temp 99; Pulse Ox 97% ; Weight 104.33 kg; Height 6 ft.  sv  

      1 in. (185.42 cm); Pain 6/10;                                                               

08:59  / 90; Pulse 70; Resp 18; Pulse Ox 98% ;                                          sv  

10:00  / 92; Pulse 72; Resp 18; Pulse Ox 99% ;                                          sv  

11:30  / 90; Pulse 73; Resp 18; Pulse Ox 98% ;                                          sv  

08:26 Body Mass Index 30.34 (104.33 kg, 185.42 cm)                                            sv  

                                                                                                  

ED Course:                                                                                        

08:06 Patient arrived in ED.                                                                  mr  

08:06 None, None is Private Physician.                                                        mr  

08:12 Soumya Eaton, RN is Primary Nurse.                                                  sv  

08:17 Arm band placed on Patient placed in an exam room, on a stretcher.                      sv  

08:18 Kareem Estes MD is Attending Physician.                                              gs  

08:24 Triage completed.                                                                       sv  

08:27 Patient has correct armband on for positive identification. Placed in gown. Bed in low  sv  

      position. Pulse ox on. NIBP on. Door closed. Head of bed elevated.                          

08:35 Initial lab(s) drawn, by me, sent to lab. Inserted saline lock: 20 gauge in right       sv  

      antecubital area, using aseptic technique. Blood collected. Flushed right antecubital       

      with 5 ml normal saline.                                                                    

10:23 Ultrasound completed. Patient tolerated well. Notified ED Physician chandana.              [id number] 

10:24 US Extremity Venous Unilateral Ltd In Process Unspecified.                              EDMS

11:56 No provider procedures requiring assistance completed. IV discontinued, intact,         sv  

      bleeding controlled, No redness/swelling at site. Pressure dressing applied.                

                                                                                                  

Administered Medications:                                                                         

10:37 Drug: Ancef 2 grams {Note: given IVP per pharmacy instructions.} Route: IVPB; Infused   sv  

      Over: 30 mins; Site: right antecubital;                                                     

10:44 Follow up: Response: No adverse reaction; IV Status: Completed infusion; IV Intake: 20mlsv  

                                                                                                  

                                                                                                  

Intake:                                                                                           

10:44 IV: 20ml; Total: 20ml.                                                                  sv  

                                                                                                  

Outcome:                                                                                          

10:59 Discharge ordered by MD.                                                                  

11:57 Discharged to home ambulatory, wheelchair offered but pt refused.                       sv  

11:57 Condition: stable                                                                           

11:57 Discharge instructions given to patient, Instructed on discharge instructions, follow       

      up and referral plans. medication usage, Demonstrated understanding of instructions,        

      follow-up care, medications, Prescriptions given X 1.                                       

11:58 Patient left the ED.                                                                    sv  

                                                                                                  

Signatures:                                                                                       

Dispatcher [employer]                           EDMS                                                 

Soumya Eaton RN RN sv Rivera, Mary mr Starr, Gregory, MD MD gs Godinez, Sarah                               [id number]                                                  

                                                                                                  

**************************************************************************************************

## 2018-10-27 NOTE — EDPHYS
Physician Documentation                                                                           

 [facility]                                                                

Name: Mack Ramírez                                                                                    

Age: 53 yrs                                                                                       

Sex: Male                                                                                         

: 1965                                                                                   

MRN: [id number]                                                                                   

Arrival Date: 10/27/2018                                                                          

Time: 08:06                                                                                       

Account#: [id number]                                                                            

Bed 5                                                                                             

Private MD: None, None                                                                            

ED Physician Kareem Estes                                                                       

HPI:                                                                                              

10/27                                                                                             

10:48 This 53 yrs old  Male presents to ER via Wheelchair with complaints of Leg     gs  

      Pain.                                                                                       

10:48 The patient presents with cellulitis of the right shin. Description: erythematous,      gs  

      warm. Onset: The symptoms/episode began/occurred 2 day(s) ago, and became persistent.       

      Associated signs and symptoms: Pertinent positives: swelling, Pertinent negatives:          

      fever. Severity of symptoms: At their worst the symptoms were moderate, in the              

      emergency department the symptoms are unchanged. The patient has experienced similar        

      episodes in the past, a few times.                                                          

                                                                                                  

Historical:                                                                                       

- Allergies:                                                                                      

08:26 No Known Allergies;                                                                     sv  

- Home Meds:                                                                                      

08:26 prednisone 10 mg Oral tab once daily [Active];                                          sv  

- PMHx:                                                                                           

08:26 low back pain; Lupus; r ARM FX-LOCKED R SHOULDER; Umbilical hernia;                     sv  

- PSHx:                                                                                           

08:26 eye;                                                                                    sv  

                                                                                                  

- Immunization history:: Flu vaccine is not up to date.                                           

- Social history:: Smoking status: Patient uses tobacco products, smokes one pack                 

  cigarettes per day.                                                                             

- Ebola Screening: : No symptoms or risks identified at this time.                                

                                                                                                  

                                                                                                  

ROS:                                                                                              

10:48 All other systems are negative.                                                         gs  

                                                                                                  

Exam:                                                                                             

10:48 Head/Face:  Normocephalic, atraumatic. Eyes:  Pupils equal round and reactive to light, gs  

      extra-ocular motions intact.  Lids and lashes normal.  Conjunctiva and sclera are           

      non-icteric and not injected.  Cornea within normal limits.  Periorbital areas with no      

      swelling, redness, or edema. ENT:  Nares patent. No nasal discharge, no septal              

      abnormalities noted.  Tympanic membranes are normal and external auditory canals are        

      clear.  Oropharynx with no redness, swelling, or masses, exudates, or evidence of           

      obstruction, uvula midline.  Mucous membranes moist. Neck:  Trachea midline, no             

      thyromegaly or masses palpated, and no cervical lymphadenopathy.  Supple, full range of     

      motion without nuchal rigidity, or vertebral point tenderness.  No Meningismus.             

      Chest/axilla:  Normal chest wall appearance and motion.  Nontender with no deformity.       

      No lesions are appreciated. Cardiovascular:  Regular rate and rhythm with a normal S1       

      and S2.  No gallops, murmurs, or rubs.  Normal PMI, no JVD.  No pulse deficits.             

      Respiratory:  Lungs have equal breath sounds bilaterally, clear to auscultation and         

      percussion.  No rales, rhonchi or wheezes noted.  No increased work of breathing, no        

      retractions or nasal flaring. Abdomen/GI:  Soft, non-tender, with normal bowel sounds.      

      No distension or tympany.  No guarding or rebound.  No evidence of tenderness               

      throughout. Back:  No spinal tenderness.  No costovertebral tenderness.  Full range of      

      motion. Neuro:  Awake and alert, GCS 15, oriented to person, place, time, and               

      situation.  Cranial nerves II-XII grossly intact.  Motor strength 5/5 in all                

      extremities.  Sensory grossly intact.  Cerebellar exam normal.  Normal gait.                

10:48 Constitutional: The patient appears alert, awake.                                           

10:48 Musculoskeletal/extremity: Extremities: noted in the right leg: erythema, swelling,         

      Pulses: are normal with no appreciated deficits, Sensation intact.                          

10:48 Skin: cellulitis, that is moderate, on the  right shin.                                     

                                                                                                  

Vital Signs:                                                                                      

08:26  / 92; Pulse 72; Resp 20; Temp 99; Pulse Ox 97% ; Weight 104.33 kg; Height 6 ft.  sv  

      1 in. (185.42 cm); Pain 6/10;                                                               

08:59  / 90; Pulse 70; Resp 18; Pulse Ox 98% ;                                          sv  

10:00  / 92; Pulse 72; Resp 18; Pulse Ox 99% ;                                          sv  

11:30  / 90; Pulse 73; Resp 18; Pulse Ox 98% ;                                          sv  

08:26 Body Mass Index 30.34 (104.33 kg, 185.42 cm)                                            sv  

                                                                                                  

MDM:                                                                                              

08:59 Patient medically screened.                                                             gs  

10:48 Differential diagnosis: cellulitis, dvt. Data reviewed: vital signs, nurses notes.        

      Response to treatment: the patient's symptoms have mildly improved after treatment, and     

      as a result, I will discharge patient.                                                      

                                                                                                  

10/27                                                                                             

09:00 Order name: CBC with Diff; Complete Time: 09:57                                         gs  

10/27                                                                                             

09:00 Order name: Basic Metabolic Panel; Complete Time: :57                                   

10/27                                                                                             

09:00 Order name: Blood Culture*                                                                

10/27                                                                                             

09:00 Order name: US Extremity Venous Unilateral Ltd                                            

10/27                                                                                             

09:42 Order name: CBC Smear Scan; Complete Time: 09:57                                        EDMS

                                                                                                  

Administered Medications:                                                                         

10:37 Drug: Ancef 2 grams {Note: given IVP per pharmacy instructions.} Route: IVPB; Infused   sv  

      Over: 30 mins; Site: right antecubital;                                                     

10:44 Follow up: Response: No adverse reaction; IV Status: Completed infusion; IV Intake: 20mlsv  

                                                                                                  

                                                                                                  

Disposition:                                                                                      

10/27/18 10:59 Discharged to Home. Impression: Cellulitis of right lower limb.                    

- Condition is Stable.                                                                            

- Discharge Instructions: Cellulitis, Adult.                                                      

- Prescriptions for Keflex 500 mg Oral Capsule - take 1 capsule by ORAL route every 6             

  hours for 10 days; 40 capsule.                                                                  

- Work release form, Medication Reconciliation Form, Thank You Letter, Antibiotic                 

  Education, Prescription Opioid Use form.                                                        

- Follow up: Private Physician; When: 2 - 3 days; Reason: Re-evaluation by your                   

  physician.                                                                                      

                                                                                                  

                                                                                                  

                                                                                                  

Signatures:                                                                                       

Dispatcher [employer]                           Soumya Guallpa RN RN                                                      

Kareem Estes MD MD                                                      

                                                                                                  

Corrections: (The following items were deleted from the chart)                                    

11:58 10:59 10/27/2018 10:59 Discharged to Home. Impression: Cellulitis of right lower limb.  sv  

      Condition is Stable. Forms are Medication Reconciliation Form, Thank You Letter,            

      Antibiotic Education, Prescription Opioid Use. Follow up: Private Physician; When: 2 -      

      3 days; Reason: Re-evaluation by your physician.                                          

                                                                                                  

**************************************************************************************************

## 2018-10-27 NOTE — RAD REPORT
EXAM DESCRIPTION:  US - Extremity Venous Uni Ltd - 10/27/2018 10:25 am

 

CLINICAL HISTORY:  Right leg pain and swelling

 

COMPARISON:  None.

 

TECHNIQUE:  Real-time sonographic evaluation of the right lower extremity deep venous systems was per
formed.

 

FINDINGS:  Normal compressibility, flow augmentation, phasic flow and spontaneous flow are identified
 in the right lower extremity common femoral, superficial femoral, popliteal and posterior tibial vei
ns. No intraluminal filling defects seen.

 

IMPRESSION:  No DVT in the right lower extremity.

## 2018-12-03 ENCOUNTER — HOSPITAL ENCOUNTER (EMERGENCY)
Dept: HOSPITAL 97 - ER | Age: 53
LOS: 1 days | Discharge: HOME | End: 2018-12-04
Payer: SELF-PAY

## 2018-12-03 DIAGNOSIS — S22.41XA: Primary | ICD-10-CM
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
## 2018-12-03 DIAGNOSIS — F17.210: ICD-10-CM

## 2018-12-03 LAB
ALBUMIN SERPL BCP-MCNC: 3.1 G/DL (ref 3.4–5)
ALP SERPL-CCNC: 68 U/L (ref 45–117)
ALT SERPL W P-5'-P-CCNC: 19 U/L (ref 12–78)
AST SERPL W P-5'-P-CCNC: 12 U/L (ref 15–37)
BUN BLD-MCNC: 23 MG/DL (ref 7–18)
GLUCOSE SERPLBLD-MCNC: 124 MG/DL (ref 74–106)
HCT VFR BLD CALC: 42.4 % (ref 39.6–49)
INR BLD: 0.96
LIPASE SERPL-CCNC: 172 U/L (ref 73–393)
LYMPHOCYTES # SPEC AUTO: 1.5 K/UL (ref 0.7–4.9)
MCH RBC QN AUTO: 31.4 PG (ref 27–35)
MCV RBC: 95.6 FL (ref 80–100)
NT-PROBNP SERPL-MCNC: 134 PG/ML (ref ?–125)
PMV BLD: 11 FL (ref 7.6–11.3)
POTASSIUM SERPL-SCNC: 3.6 MMOL/L (ref 3.5–5.1)
RBC # BLD: 4.44 M/UL (ref 4.33–5.43)
TROPONIN (EMERG DEPT USE ONLY): < 0.02 NG/ML (ref 0–0.04)

## 2018-12-03 PROCEDURE — 80076 HEPATIC FUNCTION PANEL: CPT

## 2018-12-03 PROCEDURE — 36415 COLL VENOUS BLD VENIPUNCTURE: CPT

## 2018-12-03 PROCEDURE — 96365 THER/PROPH/DIAG IV INF INIT: CPT

## 2018-12-03 PROCEDURE — 80048 BASIC METABOLIC PNL TOTAL CA: CPT

## 2018-12-03 PROCEDURE — 84484 ASSAY OF TROPONIN QUANT: CPT

## 2018-12-03 PROCEDURE — 71046 X-RAY EXAM CHEST 2 VIEWS: CPT

## 2018-12-03 PROCEDURE — 96367 TX/PROPH/DG ADDL SEQ IV INF: CPT

## 2018-12-03 PROCEDURE — 83690 ASSAY OF LIPASE: CPT

## 2018-12-03 PROCEDURE — 85025 COMPLETE CBC W/AUTO DIFF WBC: CPT

## 2018-12-03 PROCEDURE — 83880 ASSAY OF NATRIURETIC PEPTIDE: CPT

## 2018-12-03 PROCEDURE — 99285 EMERGENCY DEPT VISIT HI MDM: CPT

## 2018-12-03 PROCEDURE — 71260 CT THORAX DX C+: CPT

## 2018-12-03 PROCEDURE — 85610 PROTHROMBIN TIME: CPT

## 2018-12-03 PROCEDURE — 93005 ELECTROCARDIOGRAM TRACING: CPT

## 2018-12-03 PROCEDURE — 96375 TX/PRO/DX INJ NEW DRUG ADDON: CPT

## 2018-12-03 PROCEDURE — 74177 CT ABD & PELVIS W/CONTRAST: CPT
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
## 2018-12-04 NOTE — EKG
Test Date:    2018-12-03               Test Time:    22:29:19

Technician:   RUBÉN                                     

                                                     

MEASUREMENT RESULTS:                                       

Intervals:                                           

Rate:         69                                     

MA:           122                                    

QRSD:         100                                    

QT:           422                                    

QTc:          452                                    

Axis:                                                

P:            19                                     

MA:           122                                    

QRS:          -7                                     

T:            17                                     

                                                     

INTERPRETIVE STATEMENTS:                                       

                                                     

Sinus rhythm with premature atrial complexes

Moderate voltage criteria for LVH, may be normal variant

Borderline ECG

Compared to ECG 01/19/2018 13:11:35

Atrial premature complex(es) now present



Electronically Signed On 12-04-18 12:00:29 CST by Edy Escamilla
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
## 2019-01-25 ENCOUNTER — HOSPITAL ENCOUNTER (EMERGENCY)
Dept: HOSPITAL 97 - ER | Age: 54
Discharge: HOME | End: 2019-01-25
Payer: SELF-PAY

## 2019-01-25 DIAGNOSIS — Z72.0: ICD-10-CM

## 2019-01-25 DIAGNOSIS — S29.012A: Primary | ICD-10-CM

## 2019-01-25 PROCEDURE — 96372 THER/PROPH/DIAG INJ SC/IM: CPT

## 2019-01-25 PROCEDURE — 99282 EMERGENCY DEPT VISIT SF MDM: CPT

## 2019-01-25 NOTE — EDPHYS
Physician Documentation                                                                           

 [facility]                                                                

Name: Mack Ramírez                                                                                    

Age: 53 yrs                                                                                       

Sex: Male                                                                                         

: 1965                                                                                   

MRN: [id number]                                                                                   

Arrival Date: 2019                                                                          

Time: 09:58                                                                                       

Account#: [id number]                                                                            

Bed 20                                                                                            

Private MD: None, None                                                                            

ED Physician Bethany Fernando                                                                    

HPI:                                                                                              

                                                                                             

11:44 This 53 yrs old  Male presents to ER via Ambulatory with complaints of Back    pm1 

      Pain.                                                                                       

11:44 The patient presents with pain that is acute. The symptoms are located in the left      pm1 

      subscapular area and right subscapular area. Onset: The symptoms/episode began/occurred     

      3 day(s) ago. The pain does not radiate. Associated signs and symptoms: Pertinent           

      negatives: abdominal pain, chest pain, fever, incontinence, nausea, numbness, tingling,     

      urinary retention, vomiting, weakness. The problem was sustained Sneeze. Modifying          

      factors: The patient symptoms are alleviated by rest, lying on flat, the patient            

      symptoms are aggravated by coughing, movement. Severity of symptoms: in the emergency       

      department the symptoms are unchanged. The patient has experienced similar episodes in      

      the past, several times. The patient has not recently seen a physician.                     

11:44 Patient took some steroids that it takes for lupus flare up and it improved his pain.   pm1 

                                                                                                  

Historical:                                                                                       

- Allergies:                                                                                      

10:08 No Known Allergies;                                                                     sv  

- PMHx:                                                                                           

10:08 low back pain; Lupus; r ARM FX-LOCKED R SHOULDER; Umbilical hernia;                     sv  

10:09 COPD;                                                                                   sv  

- PSHx:                                                                                           

10:08 strabismus surgery;                                                                     sv  

                                                                                                  

- Immunization history:: Adult Immunizations up to date, Flu vaccine is not up to date.           

- Social history:: Smoking status: Patient uses tobacco products, denies chronic                  

  smoking, but will smoke occasionally, Patient/guardian denies using street drugs, IV            

  drugs.                                                                                          

- Ebola Screening: : No symptoms or risks identified at this time.                                

                                                                                                  

                                                                                                  

ROS:                                                                                              

11:44 Constitutional: Negative for fever, chills, and weight loss, Eyes: Negative for injury, pm1 

      pain, redness, and discharge, ENT: Negative for injury, pain, and discharge, Neck:          

      Negative for injury, pain, and swelling, Cardiovascular: Negative for chest pain,           

      palpitations, and edema, Respiratory: Negative for shortness of breath, cough,              

      wheezing, and pleuritic chest pain, Abdomen/GI: Negative for abdominal pain, nausea,        

      vomiting, diarrhea, and constipation.                                                       

11:44 : Negative for injury, bleeding, discharge, and swelling, MS/Extremity: Negative for      

      injury and deformity, Skin: Negative for injury, rash, and discoloration, Neuro:            

      Negative for headache, weakness, numbness, tingling, and seizure.                           

11:44 Back: Positive for of the left subscapular area and right subscapular area, pain,           

      Negative for decreased range of motion.                                                     

                                                                                                  

Exam:                                                                                             

11:44 Constitutional:  This is a well developed, well nourished patient who is awake, alert,  pm1 

      and in no acute distress. Head/Face:  Normocephalic, atraumatic. Eyes:  Pupils equal        

      round and reactive to light, extra-ocular motions intact.  Lids and lashes normal.          

      Conjunctiva and sclera are non-icteric and not injected.  Cornea within normal limits.      

      Periorbital areas with no swelling, redness, or edema. ENT:  Nares patent. No nasal         

      discharge, no septal abnormalities noted.  Tympanic membranes are normal and external       

      auditory canals are clear.  Oropharynx with no redness, swelling, or masses, exudates,      

      or evidence of obstruction, uvula midline.  Mucous membranes moist. Neck:  Trachea          

      midline, no thyromegaly or masses palpated, and no cervical lymphadenopathy.  Supple,       

      full range of motion without nuchal rigidity, or vertebral point tenderness.  No            

      Meningismus. Chest/axilla:  Normal chest wall appearance and motion.  Nontender with no     

      deformity.  No lesions are appreciated. Cardiovascular:  Regular rate and rhythm with a     

      normal S1 and S2.  No gallops, murmurs, or rubs.  Normal PMI, no JVD.  No pulse             

      deficits. Respiratory:  Lungs have equal breath sounds bilaterally, clear to                

      auscultation and percussion.  No rales, rhonchi or wheezes noted.  No increased work of     

      breathing, no retractions or nasal flaring. Abdomen/GI:  Soft, non-tender, with normal      

      bowel sounds.  No distension or tympany.  No guarding or rebound.  No evidence of           

      tenderness throughout.                                                                      

11:44 Skin:  Warm, dry with normal turgor.  Normal color with no rashes, no lesions, and no       

      evidence of cellulitis. MS/ Extremity:  Pulses equal, no cyanosis.  Neurovascular           

      intact.  Full, normal range of motion.                                                      

11:44 Back: normal spinal alignment noted, vertebral tenderness, is not appreciated, muscle       

      spasm, is appreciated in the left subscapular area and right subscapular area.              

11:44 Neuro: Orientation: is normal, Mentation: is normal, Motor: is normal, moves all fours,     

      strength is normal, strength is 5/5 in all extremities, Gait: is steady, at a normal        

      pace, without difficulty.                                                                   

                                                                                                  

Vital Signs:                                                                                      

10:09  / 94; Pulse 89; Resp 20; Temp 98.7; Pulse Ox 100% ; Weight 108.86 kg; Height 6   sv  

      ft. 1 in. (185.42 cm); Pain 8/10;                                                           

10:09 Body Mass Index 31.66 (108.86 kg, 185.42 cm)                                            sv  

                                                                                                  

MDM:                                                                                              

10:47 Patient medically screened.                                                             pm1 

11:33 Data reviewed: vital signs. Data interpreted: Pulse oximetry: on room air is 100 %.     pm1 

      Interpretation: normal.                                                                     

11:44 Counseling: I had a detailed discussion with the patient and/or guardian regarding: the pm1 

      historical points, exam findings, and any diagnostic results supporting the                 

      discharge/admit diagnosis, the need for outpatient follow up, to return to the              

      emergency department if symptoms worsen or persist or if there are any questions or         

      concerns that arise at home.                                                                

                                                                                                  

Administered Medications:                                                                         

11:35 Drug: SOLU-Medrol 125 mg Route: IM; Site: left gluteus;                                 em  

11:59 Follow up: Response: No adverse reaction; Pain is decreased                             em  

11:35 Drug: Flexeril 10 mg Route: PO;                                                         em  

11:59 Follow up: Response: No adverse reaction                                                em  

11:35 Drug: Ibuprofen 600 mg Route: PO;                                                       em  

11:59 Follow up: Response: No adverse reaction; Pain is decreased                             em  

                                                                                                  

                                                                                                  

Disposition:                                                                                      

16:46 Co-signature as Attending Physician, Bethany Fernando MD.                               ma2 

                                                                                                  

Disposition:                                                                                      

19 11:49 Discharged to Home. Impression: Strain of muscle and tendon of back wall of        

  thorax.                                                                                         

- Condition is Stable.                                                                            

- Discharge Instructions: Muscle Strain.                                                          

- Prescriptions for Naprosyn 500 mg Oral Tablet - take 1 tablet by ORAL route 2 times             

  per day take with food; 30 tablet. Cyclobenzaprine 10 mg Oral Tablet - take 1 tablet            

  by ORAL route every 8 hours As needed; 30 tablet. Medrol (Can) 4 mg Oral Tablets,               

  Dose Pack - take 1 tablet by ORAL route as directed - follow package instructions; 1            

  packet.                                                                                         

- Medication Reconciliation Form, Thank You Letter, Antibiotic Education, Prescription            

  Opioid Use form.                                                                                

- Follow up: Emergency Department; When: As needed; Reason: Worsening of condition.               

  Follow up: Private Physician; When: 2 - 3 days; Reason: Recheck today's complaints,             

  Continuance of care, Re-evaluation by your physician.                                           

- Problem is new.                                                                                 

- Symptoms have improved.                                                                         

                                                                                                  

                                                                                                  

                                                                                                  

Signatures:                                                                                       

Soumya Eaton, RN                    RN   Doc Sigala, LVN                       LVN  em                                                   

Mao Farias, NP                    NP   pm1                                                  

Bethany Fernando MD MD   ma2                                                  

                                                                                                  

Corrections: (The following items were deleted from the chart)                                    

12:00 11:49 2019 11:49 Discharged to Home. Impression: Strain of muscle and tendon of   em  

      back wall of thorax. Condition is Stable. Forms are Medication Reconciliation Form,         

      Thank You Letter, Antibiotic Education, Prescription Opioid Use. Follow up: Emergency       

      Department; When: As needed; Reason: Worsening of condition. Follow up: Private             

      Physician; When: 2 - 3 days; Reason: Recheck today's complaints, Continuance of care,       

      Re-evaluation by your physician. Problem is new. Symptoms have improved. pm1                

                                                                                                  

**************************************************************************************************

## 2019-01-25 NOTE — ER
Nurse's Notes                                                                                     

 [facility]                                                                

Name: Mack Ramírez                                                                                    

Age: 53 yrs                                                                                       

Sex: Male                                                                                         

: 1965                                                                                   

MRN: [id number]                                                                                   

Arrival Date: 2019                                                                          

Time: 09:58                                                                                       

Account#: [id number]                                                                            

Bed 20                                                                                            

Private MD: None, None                                                                            

Diagnosis: Strain of muscle and tendon of back wall of thorax                                     

                                                                                                  

Presentation:                                                                                     

                                                                                             

10:08 Presenting complaint: Patient states: mid back pain x 3 days after sneezing. Transition sv  

      of care: patient was not received from another setting of care. Onset of symptoms was       

      2019. Care prior to arrival: None.                                              

10:08 Method Of Arrival: Ambulatory                                                           sv  

10:08 Acuity: VERN 4                                                                           sv  

                                                                                                  

Triage Assessment:                                                                                

10:12 General: Appears in no apparent distress. uncomfortable, Behavior is calm, cooperative, sv  

      appropriate for age. Pain: Complains of pain in mid back area Pain currently is 8 out       

      of 10 on a pain scale. Respiratory: Respiratory effort is even, unlabored, Respiratory      

      pattern is regular, symmetrical.                                                            

                                                                                                  

Historical:                                                                                       

- Allergies:                                                                                      

10:08 No Known Allergies;                                                                     sv  

- PMHx:                                                                                           

10:08 low back pain; Lupus; r ARM FX-LOCKED R SHOULDER; Umbilical hernia;                     sv  

10:09 COPD;                                                                                   sv  

- PSHx:                                                                                           

10:08 strabismus surgery;                                                                     sv  

                                                                                                  

- Immunization history:: Adult Immunizations up to date, Flu vaccine is not up to date.           

- Social history:: Smoking status: Patient uses tobacco products, denies chronic                  

  smoking, but will smoke occasionally, Patient/guardian denies using street drugs, IV            

  drugs.                                                                                          

- Ebola Screening: : No symptoms or risks identified at this time.                                

                                                                                                  

                                                                                                  

Screenin:03 Abuse screen: Denies threats or abuse. Nutritional screening: No deficits noted.        em  

      Tuberculosis screening: No symptoms or risk factors identified. Fall Risk None              

      identified.                                                                                 

                                                                                                  

Assessment:                                                                                       

11:02 General: Appears in no apparent distress. uncomfortable, Behavior is calm, cooperative. em  

      Pain: Complains of pain in mid back area Pain currently is 8 out of 10 on a pain scale.     

      Pain began 2-3 days ago. Neuro: Level of Consciousness is awake, alert, obeys commands,     

      Oriented to person, place, time, situation, Denies paresthesias. Respiratory: Airway is     

      patent Respiratory effort is even, unlabored, Respiratory pattern is regular,               

      symmetrical. Derm: Skin is intact, is healthy with good turgor, Skin is pink, warm \T\      

      dry. Musculoskeletal: Range of motion: intact in all extremities.                           

12:00 Reassessment: Patient appears in no apparent distress at this time. Patient and/or      em  

      family updated on plan of care and expected duration. Pain level reassessed. Patient is     

      alert, oriented x 3, equal unlabored respirations, skin warm/dry/pink. Patient states       

      feeling better. Patient states symptoms have improved.                                      

                                                                                                  

Vital Signs:                                                                                      

10:09  / 94; Pulse 89; Resp 20; Temp 98.7; Pulse Ox 100% ; Weight 108.86 kg; Height 6   sv  

      ft. 1 in. (185.42 cm); Pain 8/10;                                                           

10:09 Body Mass Index 31.66 (108.86 kg, 185.42 cm)                                            sv  

                                                                                                  

ED Course:                                                                                        

09:58 Patient arrived in ED.                                                                  sb2 

09:58 None, None is Private Physician.                                                        sb2 

10:08 Triage completed.                                                                       sv  

10:12 Arm band placed on Patient placed in waiting room, Patient notified of wait time.       sv  

10:47 Mao Farias NP is PHCP.                                                           pm1 

10:47 Bethany Fernando MD is Attending Physician.                                           pm1 

10:50 Yi Arriola RN is Primary Nurse.                                                    ss  

11:03 Patient has correct armband on for positive identification. Placed in gown. Bed in low  em  

      position.                                                                                   

11:54 No provider procedures requiring assistance completed. Patient did not have IV access   em  

      during this emergency room visit.                                                           

                                                                                                  

Administered Medications:                                                                         

11:35 Drug: SOLU-Medrol 125 mg Route: IM; Site: left gluteus;                                 em  

11:59 Follow up: Response: No adverse reaction; Pain is decreased                             em  

11:35 Drug: Flexeril 10 mg Route: PO;                                                         em  

11:59 Follow up: Response: No adverse reaction                                                em  

11:35 Drug: Ibuprofen 600 mg Route: PO;                                                       em  

11:59 Follow up: Response: No adverse reaction; Pain is decreased                             em  

                                                                                                  

                                                                                                  

Outcome:                                                                                          

11:48 Discharge ordered by MD.                                                                pm1 

12:00 Patient left the ED.                                                                    em  

                                                                                                  

Signatures:                                                                                       

Soumya Eaton RN                    RN                                                      

Doc Morales, LVN                       LVN  em                                                   

Yi Arriola RN RN                                                      

Mao Farias NP                    NP   pm1                                                  

Teri Finley                               sb2                                                  

                                                                                                  

**************************************************************************************************

## 2019-02-06 ENCOUNTER — HOSPITAL ENCOUNTER (EMERGENCY)
Dept: HOSPITAL 97 - ER | Age: 54
Discharge: HOME | End: 2019-02-06
Payer: COMMERCIAL

## 2019-02-06 DIAGNOSIS — J44.1: Primary | ICD-10-CM

## 2019-02-06 DIAGNOSIS — Z72.0: ICD-10-CM

## 2019-02-06 PROCEDURE — 99284 EMERGENCY DEPT VISIT MOD MDM: CPT

## 2019-02-06 PROCEDURE — 87804 INFLUENZA ASSAY W/OPTIC: CPT

## 2019-02-06 PROCEDURE — 96374 THER/PROPH/DIAG INJ IV PUSH: CPT

## 2019-02-06 PROCEDURE — 71046 X-RAY EXAM CHEST 2 VIEWS: CPT

## 2019-02-06 NOTE — ER
Nurse's Notes                                                                                     

 [facility]                                                                

Name: Mack Ramírez                                                                                    

Age: 53 yrs                                                                                       

Sex: Male                                                                                         

: 1965                                                                                   

MRN: [id number]                                                                                   

Arrival Date: 2019                                                                          

Time: 08:49                                                                                       

Account#: [id number]                                                                            

Bed 7                                                                                             

Private MD:                                                                                       

Diagnosis: Chronic obstructive pulmonary disease with (acute) exacerbation                        

                                                                                                  

Presentation:                                                                                     

                                                                                             

09:04 Presenting complaint: Patient states: SOB X 2 weeks, hx of lupus and COPD. Transition   iw  

      of care: patient was not received from another setting of care. Onset of symptoms was       

      2019. Risk Assessment: Do you want to hurt yourself or someone else?            

      Patient reports no desire to harm self or others. Initial Sepsis Screen: Does the           

      patient meet any 2 criteria? No. Patient's initial sepsis screen is negative. Does the      

      patient have a suspected source of infection? No. Patient's initial sepsis screen is        

      negative. Care prior to arrival: None.                                                      

09:04 Method Of Arrival: Wheelchair                                                           iw  

09:04 Acuity: VERN 3                                                                           iw  

                                                                                                  

Triage Assessment:                                                                                

11:28 Respiratory: Onset: The symptoms/episode began/occurred at an unknown time. the patient bp  

      has moderate shortness of breath. Respiratory: Airway is patent.                            

                                                                                                  

Historical:                                                                                       

- Allergies:                                                                                      

09:06 No Known Allergies;                                                                     iw  

- Home Meds:                                                                                      

09:06 prednisone 10 mg Oral tab once daily [Active];                                          iw  

- PMHx:                                                                                           

09:06 COPD; low back pain; Lupus; r ARM FX-LOCKED R SHOULDER; Umbilical hernia;               iw  

- PSHx:                                                                                           

09:06 strabismus surgery;                                                                     iw  

                                                                                                  

- Immunization history:: Adult Immunizations up to date.                                          

- Ebola Screening: : Patient negative for fever greater than or equal to 101.5 degrees            

  Fahrenheit, and additional compatible Ebola Virus Disease symptoms Patient denies               

  exposure to infectious person Patient denies travel to an Ebola-affected area in the            

  21 days before illness onset No symptoms or risks identified at this time.                      

- Family history:: not pertinent.                                                                 

- Social history:: Smoking status: Patient uses tobacco products, unknown amount.                 

- Hospitalizations: : No recent hospitalization is reported.                                      

                                                                                                  

                                                                                                  

Screenin:00 Abuse screen: Denies threats or abuse. Nutritional screening: No deficits noted.        aa5 

      Tuberculosis screening: No symptoms or risk factors identified. Fall Risk None              

      identified.                                                                                 

                                                                                                  

Assessment:                                                                                       

08:58 General: Appears comfortable, Behavior is calm, cooperative. Pain: Denies pain. Neuro:  aa5 

      Level of Consciousness is awake, alert, obeys commands, Oriented to person, place,          

      time, situation. Cardiovascular: Heart tones S1 S2 present Rhythm is regular.               

      Respiratory: Reports shortness of breath on exertion cough that is productive, Airway       

      is patent Respiratory effort is even, unlabored, Respiratory pattern is regular,            

      symmetrical, Breath sounds are clear in right upper lobe, left upper lobe, right middle     

      lobe, left lower lobe, right lower lobe, left posterior upper lobe and right posterior      

      upper lobe Breath sounds are diminished in left posterior lower lobe, right posterior       

      middle lobe and right posterior lower lobe. GI: No signs and/or symptoms were reported      

      involving the gastrointestinal system. : No signs and/or symptoms were reported           

      regarding the genitourinary system. EENT: No signs and/or symptoms were reported            

      regarding the EENT system. Derm: Skin is pink, warm \T\ dry. Musculoskeletal: Range of      

      motion: intact in all extremities.                                                          

11:24 Reassessment: PT D/C HOME AMBULATORY, DX WITH ACUTE COPD.                               bp  

                                                                                                  

Vital Signs:                                                                                      

08:57  / 119; Pulse 63; Resp 22 S; Temp 97.5(O); Pulse Ox 95% on R/A; Pain 0/10;        aa5 

09:45  / 96; Pulse 65; Resp 13; Pulse Ox 100% ;                                         bp  

11:00  / 97; Pulse 63; Resp 17; Pulse Ox 100% ;                                         bp  

                                                                                                  

ED Course:                                                                                        

08:49 Patient arrived in ED.                                                                  rg4 

08:55 Rome Zaidi MD is Attending Physician.                                                rn  

08:57 Arm band placed on.                                                                     aa5 

08:57 Patient has correct armband on for positive identification.                             aa5 

08:59 Shannan Zuñiga RN is Primary Nurse.                                                   aa5 

09:05 Triage completed.                                                                       iw  

09:17 Inserted saline lock: 22 gauge in right antecubital area, using aseptic technique.      jb1 

09:32 Patient moved to radiology via wheelchair.                                              jb2 

09:39 X-ray completed. Portable x-ray completed in exam room. Patient tolerated procedure     jb2 

      well.                                                                                       

09:40 Patient moved back from radiology.                                                      jb2 

09:42 XRAY Chest Pa And Lat (2 Views) In Process Unspecified.                                 EDMS

09:55 Report given to RODDY Decker.                                                             aa5 

09:55 No provider procedures requiring assistance completed.                                  aa5 

11:24 IV discontinued, intact, bleeding controlled, No redness/swelling at site. Pressure     bp  

      dressing applied.                                                                           

                                                                                                  

Administered Medications:                                                                         

09:10 Drug: Xopenex (3) 1.25 mg Route: Inhalation;                                            aa5 

09:48 Drug: NS 0.9% 500 ml Route: IV; Rate: bolus; Site: right antecubital;                   aa5 

09:48 Drug: SOLU-Medrol 125 mg Route: IVP; Site: right antecubital;                           aa5 

09:55 Follow up: Response: No adverse reaction                                                aa5 

                                                                                                  

                                                                                                  

Outcome:                                                                                          

10:41 Discharge ordered by MD.                                                                rn  

11:24 Discharged to home ambulatory.                                                          bp  

11:24 Condition: stable                                                                           

11:24 Discharge instructions given to patient, Instructed on discharge instructions, follow       

      up and referral plans. medication usage, benefits of quitting smoking, Demonstrated         

      understanding of instructions, follow-up care, medications, Prescriptions given X 3.        

11:31 Patient left the ED.                                                                    bp  

                                                                                                  

Signatures:                                                                                       

Dispatcher [employer]                           EDMS                                                 

Jeovany Hamilton                                 jb1                                                  

Rivas Stover2                                                  

Ángela Hamilton RN RN iw Nieto, Roman, MD MD rn Calderon, Audri, RN                     RN   gian5                                                  

Anjelica Dunn4                                                  

Derek Dale RN                      RN   bp                                                   

                                                                                                  

**************************************************************************************************

## 2019-02-06 NOTE — RAD REPORT
EXAM DESCRIPTION:  Kiya Woodard And Lat (2 Views)2/6/2019 9:45 am

 

CLINICAL HISTORY:  sob

 

COMPARISON:  Dec 2018

 

FINDINGS:   Mild right basilar atelectasis has partially resolved. Left lung appears clear of acute i
nfiltrate

 

The heart is mildly enlarged

## 2019-02-06 NOTE — EDPHYS
Physician Documentation                                                                           

 [facility]                                                                

Name: Mack Ramírez                                                                                    

Age: 53 yrs                                                                                       

Sex: Male                                                                                         

: 1965                                                                                   

MRN: [id number]                                                                                   

Arrival Date: 2019                                                                          

Time: 08:49                                                                                       

Account#: [id number]                                                                            

Bed 7                                                                                             

Private MD:                                                                                       

ED Physician Rome Zaidi                                                                         

HPI:                                                                                              

                                                                                             

10:16 This 53 yrs old  Male presents to ER via Wheelchair with complaints of         rn  

      Shortness Of Breath.                                                                        

10:16 The patient has shortness of breath at rest, with light activity. Onset: The            rn  

      symptoms/episode began/occurred 2 week(s) ago. Duration: The symptoms are intermittent.     

      The patient's shortness of breath is aggravated by exertion, light activity. Severity       

      of symptoms: At their worst the symptoms were mild in the emergency department the          

      symptoms are unchanged. The patient has experienced similar episodes in the past. The       

      patient has not recently seen a physician.                                                  

10:16 Reports ran out of his standing prednisone recently, has hx of lupus and COPD, reports  rn  

      hasn't been able to get in with pulmonology and doesn't have nebulizer/inhaler at home..    

                                                                                                  

Historical:                                                                                       

- Allergies:                                                                                      

09:06 No Known Allergies;                                                                     iw  

- Home Meds:                                                                                      

09:06 prednisone 10 mg Oral tab once daily [Active];                                          iw  

- PMHx:                                                                                           

09:06 COPD; low back pain; Lupus; r ARM FX-LOCKED R SHOULDER; Umbilical hernia;               iw  

- PSHx:                                                                                           

09:06 strabismus surgery;                                                                     iw  

                                                                                                  

- Immunization history:: Adult Immunizations up to date.                                          

- Ebola Screening: : Patient negative for fever greater than or equal to 101.5 degrees            

  Fahrenheit, and additional compatible Ebola Virus Disease symptoms Patient denies               

  exposure to infectious person Patient denies travel to an Ebola-affected area in the            

  21 days before illness onset No symptoms or risks identified at this time.                      

- Family history:: not pertinent.                                                                 

- Social history:: Smoking status: Patient uses tobacco products, unknown amount.                 

- Hospitalizations: : No recent hospitalization is reported.                                      

                                                                                                  

                                                                                                  

ROS:                                                                                              

10:16 Constitutional: Negative for fever, chills, and weight loss, Eyes: Negative for injury, rn  

      pain, redness, and discharge, Neck: Negative for injury, pain, and swelling,                

      Cardiovascular: Negative for chest pain, palpitations, and edema, Respiratory: Negative     

      for pleuritic chest pain, Abdomen/GI: Negative for abdominal pain, nausea, vomiting,        

      diarrhea, and constipation, MS/Extremity: Negative for injury and deformity, Skin:          

      Negative for injury, rash, and discoloration, Neuro: Negative for headache, weakness,       

      numbness, tingling, and seizure.                                                            

                                                                                                  

Exam:                                                                                             

10:16 Constitutional:  This is a well developed, well nourished patient who is awake, alert,  rn  

      and in no acute distress. Head/Face:  Normocephalic, atraumatic. Eyes:  Pupils equal        

      round and reactive to light, extra-ocular motions intact.  Lids and lashes normal.          

      Conjunctiva and sclera are non-icteric and not injected.  Cornea within normal limits.      

      Periorbital areas with no swelling, redness, or edema. ENT:  MMM, no stridor                

      Cardiovascular:  Regular rate and rhythm with a normal S1 and S2.  No gallops, murmurs,     

      or rubs. No JVD.  No pulse deficits. Respiratory:  + mild tachypnea with diminished         

      breath sounds bilateral bases Abdomen/GI:  soft, non-tender Skin:  Warm, dry with           

      normal turgor.  Normal color with no rashes, no lesions, and no evidence of cellulitis.     

      MS/ Extremity:  Pulses equal, no cyanosis.  Neurovascular intact.  Full, normal range       

      of motion.  Equal circumference. Neuro:  Awake and alert, GCS 15, oriented to person,       

      place, time, and situation.  Cranial nerves II-XII grossly intact.  Motor strength 5/5      

      in all extremities.  Sensory grossly intact.                                                

                                                                                                  

Vital Signs:                                                                                      

08:57  / 119; Pulse 63; Resp 22 S; Temp 97.5(O); Pulse Ox 95% on R/A; Pain 0/10;        aa5 

09:45  / 96; Pulse 65; Resp 13; Pulse Ox 100% ;                                         bp  

11:00  / 97; Pulse 63; Resp 17; Pulse Ox 100% ;                                         bp  

                                                                                                  

MDM:                                                                                              

08:56 Patient medically screened.                                                             rn  

10:40 Differential diagnosis: Chronic Obstructive Pulmonary Disease pneumonia, Pneumothorax.  rn  

      Data reviewed: vital signs, nurses notes, lab test result(s), radiologic studies, plain     

      films, and as a result, I will discharge patient. Counseling: I had a detailed              

      discussion with the patient and/or guardian regarding: the historical points, exam          

      findings, and any diagnostic results supporting the discharge/admit diagnosis, lab          

      results, radiology results, the need for outpatient follow up, to return to the             

      emergency department if symptoms worsen or persist or if there are any questions or         

      concerns that arise at home. Response to treatment: the patient's symptoms have             

      markedly improved after treatment, and as a result, I will discharge patient. Special       

      discussion: I discussed with the patient/guardian in detail that at this point there is     

      no indication for admission to the hospital. It is understood, however, that if the         

      symptoms persist or worsen the patient needs to return immediately for re-evaluation.       

                                                                                                  

                                                                                             

09:03 Order name: Flu; Complete Time: 10:00                                                   rn  

                                                                                             

09:03 Order name: XRAY Chest Pa And Lat (2 Views); Complete Time: 10:20                       rn  

                                                                                             

09:03 Order name: IV Start; Complete Time: 09:18                                              rn  

                                                                                                  

Administered Medications:                                                                         

09:10 Drug: Xopenex (3) 1.25 mg Route: Inhalation;                                            aa5 

09:48 Drug: NS 0.9% 500 ml Route: IV; Rate: bolus; Site: right antecubital;                   aa5 

09:48 Drug: SOLU-Medrol 125 mg Route: IVP; Site: right antecubital;                           aa5 

09:55 Follow up: Response: No adverse reaction                                                aa5 

                                                                                                  

                                                                                                  

Disposition:                                                                                      

19 10:41 Discharged to Home. Impression: Chronic obstructive pulmonary disease with         

  (acute) exacerbation.                                                                           

- Condition is Stable.                                                                            

- Discharge Instructions: Chronic Obstructive Pulmonary Disease Exacerbation.                     

- Prescriptions for prednisone 10 mg Oral tablet - take 1 tablet by ORAL route once               

  daily; 30 tablet. Prednisone 20 mg Oral Tablet - take 3 tablet by ORAL route once               

  daily for 5 days; 15 tablet. Albuterol Sulfate 90 mcg/actuation - inhale 1-2 puff by            

  INHALATION route every 4-6 hours; 1 Inhaler.                                                    

- Medication Reconciliation Form, Thank You Letter, Antibiotic Education, Prescription            

  Opioid Use form.                                                                                

- Follow up: Private Physician; When: As needed; Reason: Recheck today's complaints,              

  Re-evaluation by your physician.                                                                

- Problem is an acute exacerbation.                                                               

- Symptoms have improved.                                                                         

                                                                                                  

                                                                                                  

                                                                                                  

Signatures:                                                                                       

Dispatcher [employer]                           Ángela Henson RN RN                                                      

Rome Zaidi MD MD rn Calderon, Audri, RN RN   aa5                                                  

Derek Dale RN RN   bp                                                   

                                                                                                  

Corrections: (The following items were deleted from the chart)                                    

11:31 10:41 2019 10:41 Discharged to Home. Impression: Chronic obstructive pulmonary    bp  

      disease with (acute) exacerbation. Condition is Stable. Forms are Medication                

      Reconciliation Form, Thank You Letter, Antibiotic Education, Prescription Opioid Use.       

      Follow up: Private Physician; When: As needed; Reason: Recheck today's complaints,          

      Re-evaluation by your physician. Problem is an acute exacerbation. Symptoms have            

      improved. rn                                                                                

                                                                                                  

**************************************************************************************************

## 2019-03-13 ENCOUNTER — HOSPITAL ENCOUNTER (EMERGENCY)
Dept: HOSPITAL 97 - ER | Age: 54
Discharge: HOME | End: 2019-03-13
Payer: COMMERCIAL

## 2019-03-13 DIAGNOSIS — J44.1: Primary | ICD-10-CM

## 2019-03-13 LAB
ALBUMIN SERPL BCP-MCNC: 3.4 G/DL (ref 3.4–5)
ALP SERPL-CCNC: 105 U/L (ref 45–117)
ALT SERPL W P-5'-P-CCNC: 16 U/L (ref 12–78)
AST SERPL W P-5'-P-CCNC: 17 U/L (ref 15–37)
BLD SMEAR INTERP: (no result)
BUN BLD-MCNC: 7 MG/DL (ref 7–18)
GLUCOSE SERPLBLD-MCNC: 79 MG/DL (ref 74–106)
HCT VFR BLD CALC: 46.5 % (ref 39.6–49)
INR BLD: 1.11
LYMPHOCYTES # SPEC AUTO: 0.7 K/UL (ref 0.7–4.9)
MAGNESIUM SERPL-MCNC: 1.9 MG/DL (ref 1.8–2.4)
MORPHOLOGY BLD-IMP: (no result)
NT-PROBNP SERPL-MCNC: 261 PG/ML (ref ?–125)
PMV BLD: 9.8 FL (ref 7.6–11.3)
POTASSIUM SERPL-SCNC: 3.9 MMOL/L (ref 3.5–5.1)
RBC # BLD: 5.03 M/UL (ref 4.33–5.43)
TROPONIN (EMERG DEPT USE ONLY): < 0.02 NG/ML (ref 0–0.04)

## 2019-03-13 PROCEDURE — 71045 X-RAY EXAM CHEST 1 VIEW: CPT

## 2019-03-13 PROCEDURE — 99285 EMERGENCY DEPT VISIT HI MDM: CPT

## 2019-03-13 PROCEDURE — 80076 HEPATIC FUNCTION PANEL: CPT

## 2019-03-13 PROCEDURE — 83735 ASSAY OF MAGNESIUM: CPT

## 2019-03-13 PROCEDURE — 83880 ASSAY OF NATRIURETIC PEPTIDE: CPT

## 2019-03-13 PROCEDURE — 80048 BASIC METABOLIC PNL TOTAL CA: CPT

## 2019-03-13 PROCEDURE — 85610 PROTHROMBIN TIME: CPT

## 2019-03-13 PROCEDURE — 85025 COMPLETE CBC W/AUTO DIFF WBC: CPT

## 2019-03-13 PROCEDURE — 96374 THER/PROPH/DIAG INJ IV PUSH: CPT

## 2019-03-13 PROCEDURE — 36415 COLL VENOUS BLD VENIPUNCTURE: CPT

## 2019-03-13 PROCEDURE — 84484 ASSAY OF TROPONIN QUANT: CPT

## 2019-03-13 PROCEDURE — 93005 ELECTROCARDIOGRAM TRACING: CPT

## 2019-03-13 NOTE — EDPHYS
Physician Documentation                                                                           

 [facility]                                                                

Name: Mack Ramírez                                                                                    

Age: 53 yrs                                                                                       

Sex: Male                                                                                         

: 1965                                                                                   

MRN: [id number]                                                                                   

Arrival Date: 2019                                                                          

Time: 10:42                                                                                       

Account#: [id number]                                                                            

Bed 13                                                                                            

Private MD:                                                                                       

ED Physician Mitch Bello                                                                      

HPI:                                                                                              

                                                                                             

12:57 This 53 yrs old  Male presents to ER via Ambulatory with complaints of COPD    jmm 

      Exacerbation.                                                                               

12:57 The patient or guardian reports cough. Onset: The symptoms/episode began/occurred       jmm 

      gradually, 1 month(s) ago. Patient complaints of ongoing shortness of breath over the       

      past month since being diagnosed with COPD. Patient states symptoms have worsened over      

      the past 3 days after running out of his prednisone. Patient denies chest pain but          

      states he feels tightness upon laying down. .                                               

                                                                                                  

Historical:                                                                                       

- Allergies:                                                                                      

10:56 No Known Allergies;                                                                     dm5 

- Home Meds:                                                                                      

12:44 prednisone 10 mg Oral tab once daily [Active];                                          rv  

- PMHx:                                                                                           

10:56 COPD; low back pain; Lupus; r ARM FX-LOCKED R SHOULDER; Umbilical hernia;               dm5 

- PSHx:                                                                                           

10:56 eye surgery many years ago;                                                             dm5 

                                                                                                  

- Immunization history:: Adult Immunizations not up to date.                                      

- Social history:: Smoking status: Patient/guardian denies using tobacco.                         

- Ebola Screening: : Patient negative for fever greater than or equal to 101.5 degrees            

  Fahrenheit, and additional compatible Ebola Virus Disease symptoms Patient denies               

  exposure to infectious person Patient denies travel to an Ebola-affected area in the            

  21 days before illness onset No symptoms or risks identified at this time.                      

                                                                                                  

                                                                                                  

ROS:                                                                                              

12:57 Constitutional: Negative for fever, chills, and weight loss, Eyes: Negative for injury, jmm 

      pain, redness, and discharge.                                                               

12:57 Respiratory: Positive for shortness of breath.                                              

12:57 All other systems are negative.                                                             

                                                                                                  

Exam:                                                                                             

12:57 Constitutional:  This is a well developed, well nourished patient who is awake, alert,  jmm 

      and in no acute distress. Head/Face:  atraumatic. Eyes:  EOMI, no conjunctival erythema     

      appreciated ENT:  Moist Mucus Membranes Neck:  Trachea midline, Supple Chest/axilla:        

      Normal chest wall appearance and motion.   Cardiovascular:  Regular rate and rhythm.        

      No edema appreciated                                                                        

12:57 Abdomen/GI:  Non distended, soft Back:  Normal ROM Skin:  General appearance color          

      normal MS/ Extremity:  Moves all extremities, no obvious deformities appreciated, no        

      edema noted to the lower extremities  Neuro:  Awake and alert, normal gait Psych:           

      Behavior is normal, Mood is normal, Patient is cooperative and pleasant                     

12:57 Respiratory: decreased breath sounds noted to the right lung base.                          

                                                                                                  

Vital Signs:                                                                                      

10:56  / 111; Pulse 77; Resp 20; Temp 97.9; Pulse Ox 97% on R/A; Weight 104.33 kg;      dm5 

      Height 6 ft. 1 in. (185.42 cm); Pain 0/10;                                                  

12:35  / 96 LA; Pulse 73; Resp 17 S; Pulse Ox 96% on R/A;                               rv  

13:00  / 107 LA; Pulse 79; Resp 17 S; Pulse Ox 95% on R/A;                              rv  

13:30  / 87 LA; Pulse 73; Resp 18 S; Pulse Ox 100% on R/A;                              rv  

14:27  / 93 LA; Pulse 80; Resp 18; Pulse Ox 96% on R/A;                                 rv  

10:56 Body Mass Index 30.34 (104.33 kg, 185.42 cm)                                            dm5 

                                                                                                  

MDM:                                                                                              

12:49 Patient medically screened.                                                             donnell 

14:09 Data reviewed: vital signs, EMS record, lab test result(s), EKG, radiologic studies,    [facility] 

      plain films. Test interpretation: by ED physician or midlevel provider: ECG.                

      Counseling: I had a detailed discussion with the patient and/or guardian regarding: the     

      historical points, exam findings, and any diagnostic results supporting the                 

      discharge/admit diagnosis, lab results, radiology results, the need for outpatient          

      follow up, to return to the emergency department if symptoms worsen or persist or if        

      there are any questions or concerns that arise at home. ED course: Patient states that      

      he feels much better. Patient is alert, non toxic in appearance, and shows no signs of      

      resp distress. I discussed lab and chest xray findings with the patient. Patient will       

      follow up with his PCP for further evaluation. Patient is otherwise given strict return     

      precautions. Patient understood and agrees with the plan of care. .                         

                                                                                                  

                                                                                             

12:56 Order name: Basic Metabolic Panel; Complete Time: 14:03                                 [facility] 

                                                                                             

12:56 Order name: CBC with Diff                                                               [facility] 

                                                                                             

12:56 Order name: LFT's; Complete Time: 14:03                                                 [facility] 

                                                                                             

12:56 Order name: Magnesium; Complete Time: 14:03                                             [facility] 

                                                                                             

12:56 Order name: NT PRO-BNP; Complete Time: 14:03                                            [facility] 

                                                                                             

12:56 Order name: PT-INR; Complete Time: 14:03                                                [facility] 

                                                                                             

12:56 Order name: Troponin (emerg Dept Use Only); Complete Time: 14:03                        [facility] 

                                                                                             

12:56 Order name: XRAY Chest (1 view); Complete Time: 14:03                                   [facility] 

                                                                                             

12:56 Order name: EKG; Complete Time: 12:57                                                   [facility] 

                                                                                             

12:56 Order name: Cardiac monitoring; Complete Time: 13:39                                    [facility] 

                                                                                             

12:56 Order name: EKG - Nurse/Tech; Complete Time: 13:39                                      [facility] 

                                                                                             

13:54 Order name: CBC Smear Scan                                                              [facility]

                                                                                             

12:56 Order name: IV Saline Lock; Complete Time: 13:39                                        [facility] 

                                                                                             

12:56 Order name: Labs collected and sent; Complete Time: 13:39                               [facility] 

                                                                                             

12:56 Order name: O2 Per Protocol; Complete Time: 13:39                                       [facility] 

                                                                                             

12:56 Order name: O2 Sat Monitoring; Complete Time: 13:39                                     [facility] 

                                                                                                  

Administered Medications:                                                                         

13:10 Drug: Xopenex (3) 1.25 mg Route: Inhalation;                                            rv  

13:41 Follow up: Response: Marked relief of symptoms                                          rv  

13:20 Drug: SOLU-Medrol 125 mg Route: IVP; Site: right antecubital;                           rv  

13:41 Follow up: Response: Marked relief of symptoms                                          rv  

                                                                                                  

                                                                                                  

Disposition:                                                                                      

15:15 Co-signature as Attending Physician, Mitch Bello MD I agree with the assessment and  donnell 

      plan of care.                                                                               

                                                                                                  

Disposition:                                                                                      

19 14:12 Discharged to Home. Impression: Chronic obstructive pulmonary disease with         

  (acute) exacerbation.                                                                           

- Condition is Stable.                                                                            

- Discharge Instructions: Chronic Obstructive Pulmonary Disease Exacerbation.                     

- Prescriptions for Prednisone 20 mg Oral Tablet - take 1 tablet by ORAL route once               

  daily .; 30 tablet. Albuterol Sulfate 90 mcg/actuation - inhale 1-2 puff by                     

  INHALATION route every 4-6 hours; 1 Inhaler.                                                    

- Medication Reconciliation Form, Thank You Letter, Antibiotic Education, Prescription            

  Opioid Use form.                                                                                

- Follow up: Private Physician; When: 2 - 3 days; Reason: Recheck today's complaints,             

  Continuance of care, Re-evaluation by your physician.                                           

                                                                                                  

                                                                                                  

                                                                                                  

Signatures:                                                                                       

Dispatcher [employer]                           Joselin Hoskins, RN                    RN   Mitch Goodrich MD MD cha Mickail, Joel, PA PA jmm Vicente, Ronaldo, RN RN   rv                                                   

                                                                                                  

Corrections: (The following items were deleted from the chart)                                    

14:28 14:12 2019 14:12 Discharged to Home. Impression: Chronic obstructive pulmonary    rv  

      disease with (acute) exacerbation. Condition is Stable. Forms are Medication                

      Reconciliation Form, Thank You Letter, Antibiotic Education, Prescription Opioid Use.       

      Follow up: Private Physician; When: 2 - 3 days; Reason: Recheck today's complaints,         

      Continuance of care, Re-evaluation by your physician. thu                                   

                                                                                                  

**************************************************************************************************

## 2019-03-13 NOTE — RAD REPORT
EXAM DESCRIPTION:  RAD - Chest Single View - 3/13/2019 1:28 pm

 

CLINICAL HISTORY:  shortness of breath

Chest pain.

 

COMPARISON:  Chest Pa And Lat (2 Views) dated 2/6/2019; Chest Pa And Lat (2 Views) dated 12/3/2018; C
hest Single View dated 1/19/2018; Chest Single View dated 11/18/2017

 

FINDINGS:  Portable technique limits examination quality.

 

Mild interstitial prominence is noted, similar to prior study, probably representing mild interstitia
l pulmonary edema. The heart is mildly enlarged in size. No displaced fractures.

 

IMPRESSION:  Mild CHF suspected.

## 2019-03-13 NOTE — ER
Nurse's Notes                                                                                     

 [facility]                                                                

Name: Mack Ramírez                                                                                    

Age: 53 yrs                                                                                       

Sex: Male                                                                                         

: 1965                                                                                   

MRN: [id number]                                                                                   

Arrival Date: 2019                                                                          

Time: 10:42                                                                                       

Account#: [id number]                                                                            

Bed 13                                                                                            

Private MD:                                                                                       

Diagnosis: Chronic obstructive pulmonary disease with (acute) exacerbation                        

                                                                                                  

Presentation:                                                                                     

                                                                                             

10:54 Presenting complaint: Patient states: recently diagnosed with COPD, coughing at night.  dm5 

      no appetite. Was given albuterol inhaler the last time he was here. Hasn't been able to     

      follow up with regular doctor or specialist. Transition of care: patient was not            

      received from another setting of care. Onset of symptoms was 2019. Risk           

      Assessment: Do you want to hurt yourself or someone else? Patient reports no desire to      

      harm self or others. Initial Sepsis Screen: Does the patient meet any 2 criteria? No.       

      Patient's initial sepsis screen is negative. Does the patient have a suspected source       

      of infection? No. Patient's initial sepsis screen is negative. Care prior to arrival:       

      None.                                                                                       

10:54 Method Of Arrival: Ambulatory                                                           dm5 

10:54 Acuity: VERN 3                                                                           dm5 

                                                                                                  

Triage Assessment:                                                                                

10:56 General: Appears in no apparent distress. Behavior is calm. Pain: Complains of pain in  dm5 

      every day lupus pain.                                                                       

                                                                                                  

Historical:                                                                                       

- Allergies:                                                                                      

10:56 No Known Allergies;                                                                     dm5 

- Home Meds:                                                                                      

12:44 prednisone 10 mg Oral tab once daily [Active];                                          rv  

- PMHx:                                                                                           

10:56 COPD; low back pain; Lupus; r ARM FX-LOCKED R SHOULDER; Umbilical hernia;               dm5 

- PSHx:                                                                                           

10:56 eye surgery many years ago;                                                             dm5 

                                                                                                  

- Immunization history:: Adult Immunizations not up to date.                                      

- Social history:: Smoking status: Patient/guardian denies using tobacco.                         

- Ebola Screening: : Patient negative for fever greater than or equal to 101.5 degrees            

  Fahrenheit, and additional compatible Ebola Virus Disease symptoms Patient denies               

  exposure to infectious person Patient denies travel to an Ebola-affected area in the            

  21 days before illness onset No symptoms or risks identified at this time.                      

                                                                                                  

                                                                                                  

Screenin:42 Abuse screen: Denies threats or abuse. Denies injuries from another. Nutritional        rv  

      screening: No deficits noted. Tuberculosis screening: No symptoms or risk factors           

      identified. Fall Risk None identified.                                                      

                                                                                                  

Assessment:                                                                                       

12:41 General: Appears in no apparent distress. comfortable, Behavior is calm, cooperative.   rv  

      Pain: Denies pain. Neuro: Level of Consciousness is awake, alert, obeys commands,           

      Oriented to person, place, time, situation. Cardiovascular: Capillary refill < 3            

      seconds. Respiratory: Breath sounds with crackles in left posterior lower lobe. GI: No      

      signs and/or symptoms were reported involving the gastrointestinal system. : No signs     

      and/or symptoms were reported regarding the genitourinary system. EENT: No signs and/or     

      symptoms were reported regarding the EENT system. Derm: Skin is intact.                     

      Musculoskeletal: No signs and/or symptoms reported regarding the musculoskeletal system.    

                                                                                                  

Vital Signs:                                                                                      

10:56  / 111; Pulse 77; Resp 20; Temp 97.9; Pulse Ox 97% on R/A; Weight 104.33 kg;      dm5 

      Height 6 ft. 1 in. (185.42 cm); Pain 0/10;                                                  

12:35  / 96 LA; Pulse 73; Resp 17 S; Pulse Ox 96% on R/A;                               rv  

13:00  / 107 LA; Pulse 79; Resp 17 S; Pulse Ox 95% on R/A;                              rv  

13:30  / 87 LA; Pulse 73; Resp 18 S; Pulse Ox 100% on R/A;                              rv  

14:27  / 93 LA; Pulse 80; Resp 18; Pulse Ox 96% on R/A;                                 rv  

10:56 Body Mass Index 30.34 (104.33 kg, 185.42 cm)                                            dm5 

                                                                                                  

ED Course:                                                                                        

10:42 Patient arrived in ED.                                                                  as  

10:55 Triage completed.                                                                       dm5 

10:56 Arm band placed on right wrist. Patient placed in waiting room.                         5 

12:34 Chauncey Pimentel PA is PHCP.                                                              m 

12:34 Mitch Bello MD is Attending Physician.                                             jmm 

12:43 Patient has correct armband on for positive identification. Bed in low position. Call   rv  

      light in reach. Side rails up X 1. Pulse ox on. NIBP on.                                    

13:20 Inserted saline lock: 20 gauge in right antecubital area, using aseptic technique.      rv  

      Blood collected.                                                                            

13:29 XRAY Chest (1 view) In Process Unspecified.                                             EDMS

13:53 EKG done, by EKG tech. reviewed by Chauncey MOLINA.                                     sm3 

14:27 No provider procedures requiring assistance completed. IV discontinued, bleeding        rv  

      controlled, No redness/swelling at site. Pressure dressing applied.                         

                                                                                                  

Administered Medications:                                                                         

13:10 Drug: Xopenex (3) 1.25 mg Route: Inhalation;                                            rv  

13:41 Follow up: Response: Marked relief of symptoms                                          rv  

13:20 Drug: SOLU-Medrol 125 mg Route: IVP; Site: right antecubital;                           rv  

13:41 Follow up: Response: Marked relief of symptoms                                          rv  

                                                                                                  

                                                                                                  

Outcome:                                                                                          

14:12 Discharge ordered by MD. andino 

14:27 Discharged to home ambulatory.                                                          rv  

14:27 Condition: good                                                                             

14:27 Discharge instructions given to patient, Instructed on discharge instructions, follow       

      up and referral plans. medication usage, Demonstrated understanding of instructions,        

      follow-up care, medications, Prescriptions given X 2.                                       

14:28 Patient left the ED.                                                                    rv  

                                                                                                  

Signatures:                                                                                       

Dispatcher [employer]                           EDMS                                                 

Joselin Hudson, RN                    RN   dm5                                                  

Chauncey Pimentel PA PA jmm Martinez, Amelia as Montes, Shakira                              sm3                                                  

Gabe Sahni RN                    RN   rv                                                   

                                                                                                  

**************************************************************************************************

## 2019-03-14 NOTE — EKG
Test Date:    2019-03-13               Test Time:    13:47:19

Technician:   COURTNEY                                     

                                                     

MEASUREMENT RESULTS:                                       

Intervals:                                           

Rate:         71                                     

CT:           132                                    

QRSD:         104                                    

QT:           422                                    

QTc:          458                                    

Axis:                                                

P:            50                                     

CT:           132                                    

QRS:          21                                     

T:            44                                     

                                                     

INTERPRETIVE STATEMENTS:                                       

                                                     

Normal sinus rhythm

Incomplete right bundle branch block

Borderline ECG

Compared to ECG 12/03/2018 22:29:19

Incomplete right bundle-branch block now present

Atrial premature complex(es) no longer present

Left ventricular hypertrophy no longer present



Electronically Signed On 03-13-19 17:47:40 CDT by Aj Bernard

## 2019-04-04 ENCOUNTER — HOSPITAL ENCOUNTER (EMERGENCY)
Dept: HOSPITAL 97 - ER | Age: 54
Discharge: HOME | End: 2019-04-04
Payer: COMMERCIAL

## 2019-04-04 DIAGNOSIS — M62.830: Primary | ICD-10-CM

## 2019-04-04 DIAGNOSIS — J44.9: ICD-10-CM

## 2019-04-04 LAB
ALBUMIN SERPL BCP-MCNC: 3.8 G/DL (ref 3.4–5)
ALP SERPL-CCNC: 91 U/L (ref 45–117)
ALT SERPL W P-5'-P-CCNC: 18 U/L (ref 12–78)
AST SERPL W P-5'-P-CCNC: 15 U/L (ref 15–37)
BUN BLD-MCNC: 19 MG/DL (ref 7–18)
GLUCOSE SERPLBLD-MCNC: 87 MG/DL (ref 74–106)
HCT VFR BLD CALC: 46.8 % (ref 39.6–49)
LIPASE SERPL-CCNC: 208 U/L (ref 73–393)
LYMPHOCYTES # SPEC AUTO: 1.4 K/UL (ref 0.7–4.9)
PMV BLD: 10.1 FL (ref 7.6–11.3)
POTASSIUM SERPL-SCNC: 3.7 MMOL/L (ref 3.5–5.1)
RBC # BLD: 4.91 M/UL (ref 4.33–5.43)
UA COMPLETE W REFLEX CULTURE PNL UR: (no result)

## 2019-04-04 PROCEDURE — 80048 BASIC METABOLIC PNL TOTAL CA: CPT

## 2019-04-04 PROCEDURE — 85025 COMPLETE CBC W/AUTO DIFF WBC: CPT

## 2019-04-04 PROCEDURE — 96374 THER/PROPH/DIAG INJ IV PUSH: CPT

## 2019-04-04 PROCEDURE — 83690 ASSAY OF LIPASE: CPT

## 2019-04-04 PROCEDURE — 74176 CT ABD & PELVIS W/O CONTRAST: CPT

## 2019-04-04 PROCEDURE — 81003 URINALYSIS AUTO W/O SCOPE: CPT

## 2019-04-04 PROCEDURE — 81015 MICROSCOPIC EXAM OF URINE: CPT

## 2019-04-04 PROCEDURE — 76377 3D RENDER W/INTRP POSTPROCES: CPT

## 2019-04-04 PROCEDURE — 36415 COLL VENOUS BLD VENIPUNCTURE: CPT

## 2019-04-04 PROCEDURE — 96361 HYDRATE IV INFUSION ADD-ON: CPT

## 2019-04-04 PROCEDURE — 99284 EMERGENCY DEPT VISIT MOD MDM: CPT

## 2019-04-04 PROCEDURE — 80076 HEPATIC FUNCTION PANEL: CPT

## 2019-04-04 NOTE — EDPHYS
Physician Documentation                                                                           

 [facility]                                                                 

Name: Mack Ramírez                                                                                    

Age: 53 yrs                                                                                       

Sex: Male                                                                                         

: 1965                                                                                   

MRN: [id number]                                                                                   

Arrival Date: 2019                                                                          

Time: 09:48                                                                                       

Account#: [id number]                                                                            

Bed 15                                                                                            

Private MD: None, None                                                                            

ED Physician Rome Zaidi                                                                         

HPI:                                                                                              

                                                                                             

10:13 This 53 yrs old  Male presents to ER via Ambulatory with complaints of         snw 

      Possible Kidney Stone.                                                                      

10:13 The patient complains of pain in the right mid back. The pain does not radiate. Onset:  snw 

      The symptoms/episode began/occurred gradually, 1 month(s) ago, and became worse 1           

      week(s) ago. Modifying factors: the symptoms are aggravated by movement. Severity of        

      pain: At its worst the pain was moderate. The patient has experienced similar episodes      

      in the past. one month ago. Pt does now have a PCP, Dr. Connors, but has not seen him yet.     

                                                                                                  

Historical:                                                                                       

- Allergies:                                                                                      

10:00 No Known Allergies;                                                                     ss  

- Home Meds:                                                                                      

10:00 prednisone 10 mg Oral tab once daily [Active];                                          ss  

- PMHx:                                                                                           

10:00 COPD; low back pain; Lupus;                                                             ss  

10:00 Kidney stones;                                                                          ss  

- PSHx:                                                                                           

10:00 eye surgery many years ago; Hernia repair;                                              ss  

                                                                                                  

- Immunization history:: Adult Immunizations unknown.                                             

- Social history:: Smoking status: Patient/guardian denies using tobacco, but has a               

  distant history of tobacco abuse.                                                               

- Ebola Screening: : Patient denies exposure to infectious person Patient denies travel           

  to an Ebola-affected area in the 21 days before illness onset.                                  

                                                                                                  

                                                                                                  

ROS:                                                                                              

10:09 Constitutional: Negative for fever, chills, and weight loss, Eyes: Negative for injury, snw 

      pain, redness, and discharge, ENT: Negative for injury, pain, and discharge, Neck:          

      Negative for injury, pain, and swelling, Cardiovascular: Negative for chest pain,           

      palpitations, and edema, Respiratory: Negative for shortness of breath, cough,              

      wheezing, and pleuritic chest pain, Abdomen/GI: Negative for abdominal pain, nausea,        

      vomiting, diarrhea, and constipation, Back: Negative for injury and pain, : Negative      

      for injury, bleeding, discharge, and swelling, Skin: Negative for injury, rash, and         

      discoloration, Neuro: Negative for headache, weakness, numbness, tingling, and seizure.     

10:09 Back: Negative for injury. Right flank pain x 1 month, given Tamulosin with some relief     

      but over the past week the pain has increased in intensity and is more constant             

      MS/Extremity: Negative for injury and deformity.                                            

10:09 Constitutional: Negative for fever.                                                         

10:09 Abdomen/GI: Negative for nausea and vomiting.                                               

                                                                                                  

Exam:                                                                                             

10:09 Head/Face:  Normocephalic, atraumatic. Eyes:  Pupils equal round and reactive to light, snw 

      extra-ocular motions intact.  Lids and lashes normal.  Conjunctiva and sclera are           

      non-icteric and not injected.  Cornea within normal limits.  Periorbital areas with no      

      swelling, redness, or edema. ENT:  Nares patent. No nasal discharge, no septal              

      abnormalities noted.  Tympanic membranes are normal and external auditory canals are        

      clear.  Oropharynx with no redness, swelling, or masses, exudates, or evidence of           

      obstruction, uvula midline.  Mucous membranes moist. Neck:  Trachea midline, no             

      thyromegaly or masses palpated, and no cervical lymphadenopathy.  Supple, full range of     

      motion without nuchal rigidity, or vertebral point tenderness.  No Meningismus.             

      Chest/axilla:  Normal chest wall appearance and motion.  Nontender with no deformity.       

      No lesions are appreciated. Cardiovascular:  Regular rate and rhythm with a normal S1       

      and S2.  No gallops, murmurs, or rubs.  Normal PMI, no JVD.  No pulse deficits.             

      Respiratory:  Lungs have equal breath sounds bilaterally, clear to auscultation and         

      percussion.  No rales, rhonchi or wheezes noted.  No increased work of breathing, no        

      retractions or nasal flaring. Abdomen/GI:  Soft, non-tender, with normal bowel sounds.      

      No distension or tympany.  No guarding or rebound.  No evidence of tenderness               

      throughout. Back:  No spinal tenderness.  No costovertebral tenderness.  Full range of      

      motion. MS/ Extremity:  Pulses equal, no cyanosis.  Neurovascular intact.  Full, normal     

      range of motion. Neuro:  Awake and alert, GCS 15, oriented to person, place, time, and      

      situation.  Cranial nerves II-XII grossly intact.  Motor strength 5/5 in all                

      extremities.  Sensory grossly intact.  Cerebellar exam normal.  Normal gait. Psych:         

      Awake, alert, with orientation to person, place and time.  Behavior, mood, and affect       

      are within normal limits.                                                                   

10:09 Constitutional: The patient appears alert, awake, hyperemic appearance                      

10:09 Skin: Appearance: Color: erythematous, patrick, Temperature: normal temperature,              

      Moisture: normal moisture.                                                                  

                                                                                                  

Vital Signs:                                                                                      

10:00  / 94; Pulse 86; Resp 16; Temp 98.0(TE); Pulse Ox 99% on R/A; Weight 104.33 kg;   ss  

      Height 6 ft. 1 in. (185.42 cm); Pain 9/10;                                                  

11:39  / 84 LA; Pulse 64; Resp 16 S; Pulse Ox 97% on R/A;                               rv  

12:46  / 86 LA Supine; Pulse 84; Resp 18; Pulse Ox 99% on R/A;                          rv  

10:00 Body Mass Index 30.34 (104.33 kg, 185.42 cm)                                            ss  

                                                                                                  

MDM:                                                                                              

10:03 Patient medically screened.                                                             snw 

12:47 Data reviewed: vital signs, nurses notes. Data interpreted: Pulse oximetry: on room air snw 

      is 99 %. Interpretation: normal. Counseling: I had a detailed discussion with the           

      patient and/or guardian regarding: the historical points, exam findings, and any            

      diagnostic results supporting the discharge/admit diagnosis, lab results, radiology         

      results, the need for outpatient follow up, to return to the emergency department if        

      symptoms worsen or persist or if there are any questions or concerns that arise at          

      home. Special discussion: I have referred the patient to see his PCP for further            

      evaluation of high blood pressure. Based on the history and exam findings, there is no      

      indication for further emergent testing or inpatient evaluation. I discussed with the       

      patient/guardian the need to see the primary care provider for further evaluation of        

      the symptoms.                                                                               

                                                                                                  

                                                                                             

10:01 Order name: Urine Microscopic Only; Complete Time: 11:55                                snw 

                                                                                             

10:09 Order name: Basic Metabolic Panel; Complete Time: 11:55                                 snw 

                                                                                             

10:09 Order name: CBC with Diff; Complete Time: 11:55                                         snw 

                                                                                             

10:09 Order name: Hepatic Function; Complete Time: 11:55                                      snw 

                                                                                             

10:09 Order name: Lipase; Complete Time: 11:55                                                snw 

                                                                                             

11:40 Order name: Urine Dipstick--Ancillary (enter results)                                   eb  

                                                                                             

10:01 Order name: Urine Dipstick-Ancillary (obtain specimen); Complete Time: 11:39            snw 

                                                                                             

10:09 Order name: IV Saline Lock; Complete Time: 10:39                                        snw 

                                                                                             

10:09 Order name: Labs collected and sent; Complete Time: 10:39                               snw 

                                                                                             

11:55 Order name: CT Stone Protocol; Complete Time: 12:36                                     snw 

                                                                                                  

Administered Medications:                                                                         

10:20 Drug: NS 0.9% 1000 ml Route: IV; Rate: 125 ml/hr; Site: right antecubital;              rv  

13:35 Follow up: IV Status: Completed infusion                                                rv  

10:20 Drug: fentaNYL (PF) 50 mcg Route: IVP; Site: right antecubital;                         rv  

11:09 Follow up: Response: Pain is decreased                                                  rv  

                                                                                                  

                                                                                                  

Disposition:                                                                                      

17:48 Co-signature as Attending Physician, Rome Zaidi MD.                                    rn  

                                                                                                  

Disposition:                                                                                      

19 12:43 Discharged to Home. Impression: Muscle spasm of back.                              

- Condition is Stable.                                                                            

- Discharge Instructions: Flank Pain, Adult, Hypertension, Muscle Cramps and Spasms.              

- Prescriptions for orphenadrine citrate 100 mg Oral Tablet Sustained Release - take 1            

  tablet by ORAL route 2 times per day As needed; 20 tablet.                                      

- Work release form, Medication Reconciliation Form, Thank You Letter, Antibiotic                 

  Education, Prescription Opioid Use form.                                                        

- Follow up: Private Physician; When: 2 - 3 days; Reason: Recheck today's complaints,             

  Continuance of care, Re-evaluation by your physician. Follow up: Emergency                      

  Department; When: As needed; Reason: Worsening of condition.                                    

                                                                                                  

                                                                                                  

                                                                                                  

Signatures:                                                                                       

Dispatcher [employer]                           EDMS                                                 

Fatemeh Reed, FNP-C                 FNP-Csnw                                                  

Rome Zaidi MD MD rn Smirch, Shelby, RN RN ss Vicente, Ronaldo, RN                    RN   rv                                                   

                                                                                                  

Corrections: (The following items were deleted from the chart)                                    

13:01 12:43 2019 12:43 Discharged to Home. Impression: Muscle spasm of back. Condition  rv  

      is Stable. Forms are Medication Reconciliation Form, Thank You Letter, Antibiotic           

      Education, Prescription Opioid Use. Follow up: Private Physician; When: 2 - 3 days;         

      Reason: Recheck today's complaints, Continuance of care, Re-evaluation by your              

      physician. Follow up: Emergency Department; When: As needed; Reason: Worsening of           

      condition. snw                                                                              

                                                                                                  

**************************************************************************************************

## 2019-04-04 NOTE — RAD REPORT
EXAM DESCRIPTION:  CT - Stone Protocol - 4/4/2019 12:11 pm

 

CLINICAL HISTORY:  Flank pain.

FLANK PAIN

 

COMPARISON:  Stone Protocol dated 3/16/2018; Chest Abdomen Pelvis W Cont dated 12/4/2018

 

TECHNIQUE:  Axial images were obtained without oral or IV contrast. Lack of contrast limits solid org
an and vascular assessment. The field-of-view spans the entirety of the  system partially obscuring
 uppermost abdomen and lung bases. Coronal reformatted images were obtained and reviewed.

 

All CT scans are performed using dose optimization technique as appropriate and may include automated
 exposure control or mA/KV adjustment according to patient size.

 

FINDINGS:  Linear subsegmental atelectasis is present in the right lung base.

 

Imaged portions of the liver and spleen show no suspicious findings on non-contrast imaging. The panc
reas and adrenal glands are normal. No pathologic lymphadenopathy in the abdomen or pelvis.

 

No urinary tract stones or obstructive uropathy.

 

No bowel obstruction, free air, free fluid or abscess. Normal appendix noted.Small fat containing umb
ilical hernia.

 

Marked anterolisthesis of L5 on S1 is seen with chronic bilateral spondylolysis. Moderate central com
pression deformity affects L4, chronic.

 

IMPRESSION:  No urinary tract stones or obstructive uropathy.

 

Significant anterolisthesis of L5 on S1 with bilateral spondylolysis, chronic.

## 2019-04-04 NOTE — ER
Nurse's Notes                                                                                     

 [facility]                                                                 

Name: Mack Ramírez                                                                                    

Age: 53 yrs                                                                                       

Sex: Male                                                                                         

: 1965                                                                                   

MRN: [id number]                                                                                   

Arrival Date: 2019                                                                          

Time: 09:48                                                                                       

Account#: [id number]                                                                            

Bed 15                                                                                            

Private MD: None, None                                                                            

Diagnosis: Muscle spasm of back                                                                   

                                                                                                  

Presentation:                                                                                     

                                                                                             

09:58 Presenting complaint: Patient states: L flank pain >1 month. Pt reports he was          ss  

      diagnosed with a kidney stone a month ago and was given tamsulosin which helped, but        

      the pain has gotten much worse for the past week. Transition of care: patient was not       

      received from another setting of care. Onset of symptoms is unknown. Risk Assessment:       

      Do you want to hurt yourself or someone else? Patient reports no desire to harm self or     

      others. Initial Sepsis Screen: Does the patient meet any 2 criteria? No. Patient's          

      initial sepsis screen is negative. Does the patient have a suspected source of              

      infection? No. Patient's initial sepsis screen is negative. Care prior to arrival: None.    

09:58 Method Of Arrival: Ambulatory                                                           ss  

09:58 Acuity: VERN 3                                                                           ss  

                                                                                                  

Historical:                                                                                       

- Allergies:                                                                                      

10:00 No Known Allergies;                                                                     ss  

- Home Meds:                                                                                      

10:00 prednisone 10 mg Oral tab once daily [Active];                                          ss  

- PMHx:                                                                                           

10:00 COPD; low back pain; Lupus;                                                             ss  

10:00 Kidney stones;                                                                          ss  

- PSHx:                                                                                           

10:00 eye surgery many years ago; Hernia repair;                                              ss  

                                                                                                  

- Immunization history:: Adult Immunizations unknown.                                             

- Social history:: Smoking status: Patient/guardian denies using tobacco, but has a               

  distant history of tobacco abuse.                                                               

- Ebola Screening: : Patient denies exposure to infectious person Patient denies travel           

  to an Ebola-affected area in the 21 days before illness onset.                                  

                                                                                                  

                                                                                                  

Screening:                                                                                        

10:40 Abuse screen: Denies threats or abuse. Denies injuries from another. Nutritional        rv  

      screening: No deficits noted. Tuberculosis screening: No symptoms or risk factors           

      identified. Fall Risk None identified.                                                      

                                                                                                  

Assessment:                                                                                       

10:39 General: Appears in no apparent distress. uncomfortable, Behavior is calm, cooperative. rv  

      Pain: Complains of pain in right mid back. Neuro: Level of Consciousness is awake,          

      alert, obeys commands, Oriented to person, place, time, situation. Cardiovascular:          

      Capillary refill < 3 seconds. Respiratory: Airway is patent. GI: Bowel sounds present X     

      4 quads. Abd is soft and non tender X 4 quads. : No signs and/or symptoms were            

      reported regarding the genitourinary system. EENT: No signs and/or symptoms were            

      reported regarding the EENT system. Derm: Skin is intact. Musculoskeletal: No signs         

      and/or symptoms reported regarding the musculoskeletal system.                              

11:39 Reassessment: Patient appears in no apparent distress at this time. Patient and/or      rv  

      family updated on plan of care and expected duration. Pain level reassessed. Patient is     

      alert, oriented x 3, equal unlabored respirations, skin warm/dry/pink. Patient states       

      feeling better. Patient states symptoms have improved.                                      

                                                                                                  

Vital Signs:                                                                                      

10:00  / 94; Pulse 86; Resp 16; Temp 98.0(TE); Pulse Ox 99% on R/A; Weight 104.33 kg;   ss  

      Height 6 ft. 1 in. (185.42 cm); Pain 9/10;                                                  

11:39  / 84 LA; Pulse 64; Resp 16 S; Pulse Ox 97% on R/A;                               rv  

12:46  / 86 LA Supine; Pulse 84; Resp 18; Pulse Ox 99% on R/A;                          rv  

10:00 Body Mass Index 30.34 (104.33 kg, 185.42 cm)                                              

                                                                                                  

ED Course:                                                                                        

09:48 Patient arrived in ED.                                                                  mr  

09:48 None, None is Private Physician.                                                        mr  

09:59 Triage completed.                                                                       ss  

10:00 Arm band placed on right wrist.                                                         ss  

10:03 [facility]                                                        snw 

10:03 Rome Zaidi MD is Attending Physician.                                                snw 

10:30 No provider procedures requiring assistance completed. Inserted saline lock: 20 gauge   rv  

      in right antecubital area, using aseptic technique.                                         

10:40 Patient has correct armband on for positive identification. Call light in reach. Side   rv  

      rails up X 1. Pulse ox on. NIBP on.                                                         

11:39 Urine Microscopic Only Sent.                                                            rv  

12:12 CT Stone Protocol In Process Unspecified.                                               EDMS

12:48 IV discontinued, bleeding controlled, No redness/swelling at site. Pressure dressing    rv  

      applied.                                                                                    

13:00 Gabe Sahni RN is Primary Nurse.                                                  rv  

                                                                                                  

Administered Medications:                                                                         

10:20 Drug: NS 0.9% 1000 ml Route: IV; Rate: 125 ml/hr; Site: right antecubital;              rv  

13:35 Follow up: IV Status: Completed infusion                                                rv  

10:20 Drug: fentaNYL (PF) 50 mcg Route: IVP; Site: right antecubital;                         rv  

11:09 Follow up: Response: Pain is decreased                                                  rv  

                                                                                                  

                                                                                                  

Outcome:                                                                                          

12:43 Discharge ordered by MD. jerry 

12:47 Discharged to home ambulatory.                                                          rv  

12:47 Condition: good                                                                             

13:00 Discharge instructions given to patient, Instructed on discharge instructions, follow   rv  

      up and referral plans. medication usage, Demonstrated understanding of instructions,        

      follow-up care, medications, Prescriptions given X 1.                                       

13:01 Patient left the ED.                                                                    rv  

                                                                                                  

Signatures:                                                                                       

Dispatcher [employer]                           EDMS                                                 

Fatemeh Reed, THAIP-C                 FNP-Joana Mcdonnell Shelby, RN                      RN   Gabe Mei RN                    RN   rv                                                   

                                                                                                  

**************************************************************************************************

## 2019-04-14 ENCOUNTER — HOSPITAL ENCOUNTER (EMERGENCY)
Dept: HOSPITAL 97 - ER | Age: 54
Discharge: HOME | End: 2019-04-14
Payer: COMMERCIAL

## 2019-04-14 DIAGNOSIS — J18.9: Primary | ICD-10-CM

## 2019-04-14 DIAGNOSIS — J44.9: ICD-10-CM

## 2019-04-14 PROCEDURE — 71045 X-RAY EXAM CHEST 1 VIEW: CPT

## 2019-04-14 PROCEDURE — 99284 EMERGENCY DEPT VISIT MOD MDM: CPT

## 2019-04-14 PROCEDURE — 94640 AIRWAY INHALATION TREATMENT: CPT

## 2019-04-14 NOTE — ER
Nurse's Notes                                                                                     

 [facility]                                                                 

Name: Mack Ramírez                                                                                    

Age: 53 yrs                                                                                       

Sex: Male                                                                                         

: 1965                                                                                   

MRN: [id number]                                                                                   

Arrival Date: 2019                                                                          

Time: 11:25                                                                                       

Account#: [id number]                                                                            

Bed 19                                                                                            

Private MD: Brayan Connors T                                                                        

Diagnosis: Pneumonia, unspecified organism;Back pain;Chronic obstructive pulmonary disease,       

  unspecified                                                                                     

                                                                                                  

Presentation:                                                                                     

                                                                                             

11:37 Presenting complaint: Patient states: lower back pain that began last night. Pt states  aa5 

      "I am having back spasms". Transition of care: patient was not received from another        

      setting of care. Onset of symptoms was 2019. Risk Assessment: Do you want to hurt     

      yourself or someone else? Patient reports no desire to harm self or others. Initial         

      Sepsis Screen: Does the patient meet any 2 criteria? No. Patient's initial sepsis           

      screen is negative. Does the patient have a suspected source of infection? No.              

      Patient's initial sepsis screen is negative. Care prior to arrival: None.                   

11:37 Method Of Arrival: Ambulatory                                                           aa5 

11:37 Acuity: VERN 3                                                                           aa5 

                                                                                                  

Historical:                                                                                       

- Allergies:                                                                                      

11:38 No Known Allergies;                                                                     aa5 

- PMHx:                                                                                           

11:38 COPD; Kidney stones; low back pain; Lupus;                                              aa5 

- PSHx:                                                                                           

11:38 eye surgery many years ago; Hernia repair;                                              aa5 

                                                                                                  

- Immunization history:: Flu vaccine is not up to date.                                           

- Social history:: Smoking status: Patient/guardian denies using tobacco.                         

- Ebola Screening: : No symptoms or risks identified at this time.                                

                                                                                                  

                                                                                                  

Screenin:13 Abuse screen: Denies threats or abuse. Nutritional screening: No deficits noted.        em  

      Tuberculosis screening: No symptoms or risk factors identified. Fall Risk None              

      identified.                                                                                 

                                                                                                  

Assessment:                                                                                       

12:04 General: Appears in no apparent distress. uncomfortable, Behavior is calm, cooperative, em  

      Denies fever. Pain: Complains of pain in lumbar area Pain currently is 8 out of 10 on a     

      pain scale. Quality of pain is described as "spasms" Pain began 1 day ago. Aggravated       

      by exercise, repositioning. Neuro: Level of Consciousness is awake, alert, obeys            

      commands, Oriented to person, place, time, situation, Denies paresthesias.                  

      Cardiovascular: Capillary refill < 3 seconds Patient's skin is warm and dry.                

      Respiratory: Airway is patent Respiratory effort is even, unlabored, Respiratory            

      pattern is regular, symmetrical. Derm: Skin is intact, is healthy with good turgor,         

      Skin is pink, warm \T\ dry. Musculoskeletal: Circulation, motion, and sensation intact.     

      Capillary refill < 3 seconds, Range of motion: intact in all extremities.                   

12:30 General: The previous assessment is accurate, call light remains within reach. .        ss  

13:49 Reassessment: Patient appears in no apparent distress at this time. Patient and/or      em  

      family updated on plan of care and expected duration. Pain level reassessed. Patient is     

      alert, oriented x 3, equal unlabored respirations, skin warm/dry/pink. Patient denies       

      pain at this time. Patient states feeling better. Patient states symptoms have improved.    

14:58 Reassessment: Patient appears in no apparent distress at this time. Patient and/or      em  

      family updated on plan of care and expected duration. Pain level reassessed. Patient is     

      alert, oriented x 3, equal unlabored respirations, skin warm/dry/pink. Patient denies       

      pain at this time. Patient states feeling better. Patient states symptoms have improved.    

                                                                                                  

Vital Signs:                                                                                      

11:38  / 93; Pulse 84; Resp 18 S; Temp 99.2(TE); Pulse Ox 97% on R/A; Weight 93.89 kg   aa5 

      (R); Height 6 ft. 1 in. (185.42 cm) (R); Pain 8/10;                                         

13:32  / 105; Pulse 73; Resp 20; Pulse Ox 94% on R/A; Pain 0/10;                        mh5 

14:58  / 98; Pulse 74; Resp 18; Pulse Ox 99% on R/A; Pain 0/10;                         em  

11:38 Body Mass Index 27.31 (93.89 kg, 185.42 cm)                                             aa5 

                                                                                                  

ED Course:                                                                                        

11:25 Patient arrived in ED.                                                                  as  

11:25 Brayan Connors MD is Private Physician.                                                   as  

11:37 Arm band placed on.                                                                     aa5 

11:38 Triage completed.                                                                       aa5 

11:38 Dolores Muse [facility]                                                        kb  

11:38 Mitch Bello MD is Attending Physician.                                             kb  

11:55 Doc Morales LVN is Primary Nurse.                                                     em  

12:13 Patient has correct armband on for positive identification. Bed in low position. Call   em  

      light in reach. Side rails up X2.                                                           

12:46 Chest Single View XRAY In Process Unspecified.                                          EDMS

14:56 No provider procedures requiring assistance completed. Patient did not have IV access   em  

      during this emergency room visit.                                                           

                                                                                                  

Administered Medications:                                                                         

12:18 Drug: Norco (7.5 mg-325 mg) 1 tabs Route: PO;                                           em  

13:49 Follow up: Response: No adverse reaction; Pain is decreased                             em  

13:49 Drug: DuoNeb (3:1) (2.5 mg - 0.5 mg) 3 ml Route: Nebulizer;                             em  

14:56 Follow up: Response: No adverse reaction; Marked relief of symptoms                     em  

13:49 Drug: Zithromax 500 mg Route: PO;                                                       em  

14:57 Follow up: Response: No adverse reaction                                                em  

                                                                                                  

                                                                                                  

Outcome:                                                                                          

14:26 Discharge ordered by MD.                                                                kb  

14:56 Discharged to home ambulatory.                                                          em  

14:56 Condition: good                                                                             

14:56 Discharge instructions given to patient, Instructed on discharge instructions, follow       

      up and referral plans. medication usage, Demonstrated understanding of instructions,        

      follow-up care, medications, Prescriptions given X 1.                                       

14:59 Patient left the ED.                                                                    em  

                                                                                                  

Signatures:                                                                                       

Dispatcher [employer]                           EDMS                                                 

Dolores Muse, FNP-C                 FNP-Ckb                                                   

Doc Morales, LVN                       LVN  em                                                   

Marisa Holder Audri, RN                     RN   aa5                                                  

Yi Arriola, Bethany Parrish RN                              [facility]                                                  

                                                                                                  

**************************************************************************************************

## 2019-04-14 NOTE — EDPHYS
Physician Documentation                                                                           

 [facility]                                                                 

Name: Mack Ramírez                                                                                    

Age: 53 yrs                                                                                       

Sex: Male                                                                                         

: 1965                                                                                   

MRN: [id number]                                                                                   

Arrival Date: 2019                                                                          

Time: 11:25                                                                                       

Account#: [id number]                                                                            

Bed 19                                                                                            

Private MD: Brayan Connors T                                                                        

ED Physician Mitch Bello                                                                      

HPI:                                                                                              

                                                                                             

13:04 This 53 yrs old  Male presents to ER via Ambulatory with complaints of Back    kb  

      Pain.                                                                                       

13:04 The patient presents with pain that is acute. The symptoms are located in the mid back  kb  

      area. Onset: The symptoms/episode began/occurred 2 week(s) ago, and became worse today.     

      The pain does not radiate. Associated signs and symptoms: The patient has no apparent       

      associated signs or symptoms. The problem was sustained when bending over. Modifying        

      factors: The patient symptoms are alleviated by nothing, the patient symptoms are           

      aggravated by any movement. Severity of symptoms: At their worst the symptoms were          

      moderate, in the emergency department the symptoms have improved. The patient has           

      experienced similar episodes in the past. The patient has not recently seen a physician.    

                                                                                                  

Historical:                                                                                       

- Allergies:                                                                                      

11:38 No Known Allergies;                                                                     aa5 

- PMHx:                                                                                           

11:38 COPD; Kidney stones; low back pain; Lupus;                                              aa5 

- PSHx:                                                                                           

11:38 eye surgery many years ago; Hernia repair;                                              aa5 

                                                                                                  

- Immunization history:: Flu vaccine is not up to date.                                           

- Social history:: Smoking status: Patient/guardian denies using tobacco.                         

- Ebola Screening: : No symptoms or risks identified at this time.                                

                                                                                                  

                                                                                                  

ROS:                                                                                              

13:03 Constitutional: Negative for fever, chills, and weight loss, Cardiovascular: Negative   kb  

      for chest pain, palpitations, and edema, Respiratory: Negative for shortness of breath,     

      cough, wheezing, and pleuritic chest pain, Abdomen/GI: Negative for abdominal pain,         

      nausea, vomiting, diarrhea, and constipation, : Negative for injury, bleeding,            

      discharge, and swelling, MS/Extremity: Negative for injury and deformity, Skin:             

      Negative for injury, rash, and discoloration, Neuro: Negative for headache, weakness,       

      numbness, tingling, and seizure.                                                            

13:03 Back: Positive for pain at rest, of the left subscapular area, right subscapular area       

      and thoracic area.                                                                          

                                                                                                  

Exam:                                                                                             

13:03 Constitutional:  This is a well developed, well nourished patient who is awake, alert,  kb  

      and in no acute distress. Head/Face:  Normocephalic, atraumatic. ENT:  Nares patent. No     

      nasal discharge, no septal abnormalities noted.  Tympanic membranes are normal and          

      external auditory canals are clear.  Oropharynx with no redness, swelling, or masses,       

      exudates, or evidence of obstruction, uvula midline.  Mucous membranes moist. Neck:         

      Trachea midline, no thyromegaly or masses palpated, and no cervical lymphadenopathy.        

      Supple, full range of motion without nuchal rigidity, or vertebral point tenderness.        

      No Meningismus. Chest/axilla:  Normal chest wall appearance and motion.  Nontender with     

      no deformity.  No lesions are appreciated. Cardiovascular:  Regular rate and rhythm         

      with a normal S1 and S2.  No gallops, murmurs, or rubs.  Normal PMI, no JVD.  No pulse      

      deficits. Respiratory:  Lungs have equal breath sounds bilaterally, clear to                

      auscultation and percussion.  No rales, rhonchi or wheezes noted.  No increased work of     

      breathing, no retractions or nasal flaring. Abdomen/GI:  Soft, non-tender, with normal      

      bowel sounds.  No distension or tympany.  No guarding or rebound.  No evidence of           

      tenderness throughout. Skin:  Warm, dry with normal turgor.  Normal color with no           

      rashes, no lesions, and no evidence of cellulitis. MS/ Extremity:  Pulses equal, no         

      cyanosis.  Neurovascular intact.  Full, normal range of motion. Neuro:  Awake and           

      alert, GCS 15, oriented to person, place, time, and situation.  Cranial nerves II-XII       

      grossly intact.  Motor strength 5/5 in all extremities.  Sensory grossly intact.            

      Cerebellar exam normal.  Normal gait.                                                       

13:03 Back: vertebral tenderness, is appreciated at T10, T11 and T12.                             

                                                                                                  

Vital Signs:                                                                                      

11:38  / 93; Pulse 84; Resp 18 S; Temp 99.2(TE); Pulse Ox 97% on R/A; Weight 93.89 kg   aa5 

      (R); Height 6 ft. 1 in. (185.42 cm) (R); Pain 8/10;                                         

13:32  / 105; Pulse 73; Resp 20; Pulse Ox 94% on R/A; Pain 0/10;                        mh5 

14:58  / 98; Pulse 74; Resp 18; Pulse Ox 99% on R/A; Pain 0/10;                         em  

11:38 Body Mass Index 27.31 (93.89 kg, 185.42 cm)                                             aa5 

                                                                                                  

MDM:                                                                                              

11:39 Patient medically screened.                                                             [facility] 

13:03 Data reviewed: vital signs, nurses notes. Data interpreted: Pulse oximetry: on room air kb  

      is 97 %. Interpretation: normal. Counseling: I had a detailed discussion with the           

      patient and/or guardian regarding: the historical points, exam findings, and any            

      diagnostic results supporting the discharge/admit diagnosis, radiology results, the         

      need for outpatient follow up, a family practitioner, to return to the emergency            

      department if symptoms worsen or persist or if there are any questions or concerns that     

      arise at home.                                                                              

                                                                                                  

                                                                                             

11:51 Order name: Chest Single View XRAY; Complete Time: 13:41                                kb  

                                                                                                  

Administered Medications:                                                                         

12:18 Drug: Norco (7.5 mg-325 mg) 1 tabs Route: PO;                                           em  

13:49 Follow up: Response: No adverse reaction; Pain is decreased                             em  

13:49 Drug: DuoNeb (3:1) (2.5 mg - 0.5 mg) 3 ml Route: Nebulizer;                             em  

14:56 Follow up: Response: No adverse reaction; Marked relief of symptoms                     em  

13:49 Drug: Zithromax 500 mg Route: PO;                                                       em  

14:57 Follow up: Response: No adverse reaction                                                em  

                                                                                                  

                                                                                                  

Disposition:                                                                                      

04/15                                                                                             

09:11 Co-signature as Attending Physician, Mitch Bello MD I agree with the assessment and  [facility] 

      plan of care.                                                                               

                                                                                                  

Disposition:                                                                                      

19 14:26 Discharged to Home. Impression: Pneumonia, unspecified organism, Back pain,        

  Chronic obstructive pulmonary disease, unspecified.                                             

- Condition is Stable.                                                                            

- Discharge Instructions: Chronic Obstructive Pulmonary Disease, Back Pain, Adult,                

  Easy-to-Read, Community-Acquired Pneumonia, Adult, Easy-to-Read.                                

- Prescriptions for Zithromax 500 mg Oral Tablet - take 1 tablet by ORAL route once               

  daily for 5 days; 5 tablet.                                                                     

- Medication Reconciliation Form, Thank You Letter, Antibiotic Education, Prescription            

  Opioid Use form.                                                                                

- Follow up: Private Physician; When: 2 - 3 days; Reason: Recheck today's complaints,             

  Continuance of care, Re-evaluation by your physician. Follow up: Emergency                      

  Department; When: As needed; Reason: Worsening of condition.                                    

                                                                                                  

                                                                                                  

                                                                                                  

Signatures:                                                                                       

Dispatcher [employer]                           Dolores Polo, FNP-C                 FNP-Ckb                                                   

Mitch Bello MD MD cha Munoz, Edgar, LVN                       LVN  em                                                   

Shannan Zuñiga, RN                     RN   aa5                                                  

                                                                                                  

Corrections: (The following items were deleted from the chart)                                    

                                                                                             

14:26 14:26 2019 14:26 Discharged to Home. Impression: Pneumonia, unspecified organism. kb  

      Condition is Stable. Forms are Medication Reconciliation Form, Thank You Letter,            

      Antibiotic Education, Prescription Opioid Use. Follow up: Private Physician; When: 2 -      

      3 days; Reason: Recheck today's complaints, Continuance of care, Re-evaluation by your      

      physician. Follow up: Emergency Department; When: As needed; Reason: Worsening of           

      condition. kb                                                                               

14:59 14:26 2019 14:26 Discharged to Home. Impression: Pneumonia, unspecified organism; em  

      Back pain; Chronic obstructive pulmonary disease, unspecified. Condition is Stable.         

      Forms are Medication Reconciliation Form, Thank You Letter, Antibiotic Education,           

      Prescription Opioid Use. Follow up: Private Physician; When: 2 - 3 days; Reason:            

      Recheck today's complaints, Continuance of care, Re-evaluation by your physician.           

      Follow up: Emergency Department; When: As needed; Reason: Worsening of condition. kb        

                                                                                                  

**************************************************************************************************

## 2019-04-14 NOTE — RAD REPORT
EXAM DESCRIPTION:  RAD - Chest Single View - 4/14/2019 12:45 pm

 

CLINICAL HISTORY:  Dyspnea, shortness of breath

 

COMPARISON:  March 2019

 

TECHNIQUE:  AP portable chest image was obtained 1229 hours .

 

FINDINGS:  Lung volumes are low accentuating lung markings. Patient likely has interstitial edema or 
infiltrate superimposed on the chronic lung pattern. Heart size is normal range and stable. Vascular 
engorgement is seen. Trachea is midline. No measurable pleural effusion and no pneumothorax. No acute
 bony abnormality seen. No acute aortic findings suspected.

 

IMPRESSION:  Mild interstitial edema or infiltrate superimposed on chronic interstitial disease.

## 2019-04-15 ENCOUNTER — HOSPITAL ENCOUNTER (EMERGENCY)
Dept: HOSPITAL 97 - ER | Age: 54
Discharge: HOME | End: 2019-04-15
Payer: COMMERCIAL

## 2019-04-15 DIAGNOSIS — Z79.899: ICD-10-CM

## 2019-04-15 DIAGNOSIS — J44.9: ICD-10-CM

## 2019-04-15 DIAGNOSIS — Z79.52: ICD-10-CM

## 2019-04-15 DIAGNOSIS — M54.5: Primary | ICD-10-CM

## 2019-04-15 PROCEDURE — 96372 THER/PROPH/DIAG INJ SC/IM: CPT

## 2019-04-15 PROCEDURE — 99283 EMERGENCY DEPT VISIT LOW MDM: CPT

## 2019-04-15 NOTE — ER
Nurse's Notes                                                                                     

 [facility]                                                                 

Name: Mack Ramírez                                                                                    

Age: 53 yrs                                                                                       

Sex: Male                                                                                         

: 1965                                                                                   

MRN: [id number]                                                                                   

Arrival Date: 04/15/2019                                                                          

Time: 15:03                                                                                       

Account#: [id number]                                                                            

Bed Treatment                                                                                     

Private MD:                                                                                       

Diagnosis: Low back pain-mid back                                                                 

                                                                                                  

Presentation:                                                                                     

04/15                                                                                             

15:04 Presenting complaint: Patient states: back pain that began 1 week ago. Pt was seen in     

      ER yesterday for same complaint, but reports that the pain has not improved. Pt states      

      that his PCP told him to come to the ER if his pain is not manageable. Transition of        

      care: patient was not received from another setting of care. Onset of symptoms was          

      2019. Risk Assessment: Do you want to hurt yourself or someone else? Patient      

      reports no desire to harm self or others. Initial Sepsis Screen: Does the patient meet      

      any 2 criteria? HR > 90 bpm. Does the patient have a suspected source of infection? No.     

      Patient's initial sepsis screen is negative. Care prior to arrival: None.                   

15:04 Method Of Arrival: Ambulatory                                                             

15:04 Acuity: VERN 4                                                                             

                                                                                                  

Historical:                                                                                       

- Allergies:                                                                                      

15:07 No Known Allergies;                                                                     ss  

- Home Meds:                                                                                      

15:07 prednisone 10 mg Oral tab once daily [Active]; gabapentin oral oral [Active];           ss  

15:07 orphenadrine citrate 100 mg oral TbER 1 tab 2 times per day [Active];                   ss  

- PMHx:                                                                                           

15:07 COPD; Kidney stones; low back pain; Lupus;                                              ss  

- PSHx:                                                                                           

15:07 eye surgery many years ago; Hernia repair;                                                

                                                                                                  

- Immunization history:: Adult Immunizations unknown.                                             

- Social history:: Smoking status: Patient/guardian denies using tobacco.                         

- Ebola Screening: : Patient denies exposure to infectious person Patient denies travel           

  to an Ebola-affected area in the 21 days before illness onset.                                  

                                                                                                  

                                                                                                  

Screenin:14 Abuse screen: Denies threats or abuse. Denies injuries from another. Nutritional        aj1 

      screening: No deficits noted. Tuberculosis screening: No symptoms or risk factors           

      identified.                                                                                 

17:34 Fall Risk None identified.                                                              aj1 

                                                                                                  

Assessment:                                                                                       

16:14 General: Appears uncomfortable, Behavior is calm, cooperative, appropriate for age.     aj1 

      Pain: Complains of pain in mid back area. Neuro: Level of Consciousness is awake,           

      alert, obeys commands, Oriented to person, place, time, situation, Moves all                

      extremities. Full function Gait is steady. Cardiovascular: Patient's skin is warm and       

      dry. Respiratory: Airway is patent Respiratory effort is even, unlabored, Respiratory       

      pattern is regular, symmetrical. GI: No signs and/or symptoms were reported involving       

      the gastrointestinal system. : No signs and/or symptoms were reported regarding the       

      genitourinary system. EENT: No signs and/or symptoms were reported regarding the EENT       

      system. Derm: No signs and/or symptoms reported regarding the dermatologic system. Skin     

      is pink, warm \T\ dry. normal. Musculoskeletal: No signs and/or symptoms reported           

      regarding the musculoskeletal system. Circulation, motion, and sensation intact.            

16:48 Reassessment: Patient appears in no apparent distress at this time. No changes from     1 

      previously documented assessment. Patient and/or family updated on plan of care and         

      expected duration. Pain level reassessed. Patient is alert, oriented x 3, equal             

      unlabored respirations, skin warm/dry/pink. Patient reports continued pain at this          

      time. Medicated with Toradol.                                                               

17:33 Reassessment: Patient appears in no apparent distress at this time. No changes from     aj1 

      previously documented assessment. Patient and/or family updated on plan of care and         

      expected duration. Pain level reassessed. Patient is alert, oriented x 3, equal             

      unlabored respirations, skin warm/dry/pink.                                                 

                                                                                                  

Vital Signs:                                                                                      

15:07  / 105; Pulse 98; Resp 18; Temp 98.4(TE); Pulse Ox 95% on R/A; Weight 93.89 kg;   ss  

      Height 6 ft. 1 in. (185.42 cm); Pain 9/10;                                                  

15:07 Body Mass Index 27.31 (93.89 kg, 185.42 cm)                                               

                                                                                                  

ED Course:                                                                                        

15:03 Patient arrived in ED.                                                                  as  

15:06 Triage completed.                                                                       ss  

15:07 Arm band placed on left wrist.                                                          ss  

15:46 Dolores Muse FNP-C is PHCP.                                                        kb  

15:46 Mitch Bello MD is Attending Physician.                                             kb  

15:55 Valerie Casper, RN is Primary Nurse.                                                   aj1 

16:06 Radiology exam delayed due to patient refusing to lay down for exam at this time due to nj  

      pain, will try again at a later time.                                                       

16:14 Patient has correct armband on for positive identification. Call light in reach.        aj1 

16:14 No provider procedures requiring assistance completed.                                  aj1 

17:00 Radiology exam delayed due to pt still unable to lay down flat for exam; Dolores veras2 

      notifiied.                                                                                  

17:16 Campos Stevens MD is Referral Physician.                                                kb  

17:34 Patient did not have IV access during this emergency room visit.                        aj1 

                                                                                                  

Administered Medications:                                                                         

16:10 Drug: Norco (7.5 mg-325 mg) 1 tabs Route: PO;                                           aj1 

17:34 Follow up: Response: No adverse reaction                                                aj1 

16:47 Drug: TORadol 60 mg Route: IM; Site: left gluteus;                                      aj1 

17:35 Follow up: Response: No adverse reaction                                                aj1 

                                                                                                  

                                                                                                  

Outcome:                                                                                          

17:16 Discharge ordered by MD.                                                                kb  

17:34 Discharged to home ambulatory.                                                          aj1 

17:34 Condition: good                                                                             

17:34 Discharge instructions given to patient, Instructed on discharge instructions, follow       

      up and referral plans. Demonstrated understanding of instructions, follow-up care.          

17:35 Patient left the ED.                                                                    aj1 

                                                                                                  

Signatures:                                                                                       

Dolores Muse, FNP-C                 FNP-Ckb                                                   

Valerie Casper, RN                     RN   aj1                                                  

Marisa Holder Shelby, RODDY                      RN                                                      

Danial May Victoria vm2                                                  

Dania Dunn                              [facility]                                                  

                                                                                                  

Corrections: (The following items were deleted from the chart)                                    

16:10 15:53 Patient moved to [address]  

                                                                                                  

**************************************************************************************************

## 2019-04-15 NOTE — EDPHYS
Physician Documentation                                                                           

 [facility]                                                                 

Name: Mack Ramírez                                                                                    

Age: 53 yrs                                                                                       

Sex: Male                                                                                         

: 1965                                                                                   

MRN: [id number]                                                                                   

Arrival Date: 04/15/2019                                                                          

Time: 15:03                                                                                       

Account#: [id number]                                                                            

Bed Treatment                                                                                     

Private MD:                                                                                       

ED Physician Mitch Bello                                                                      

HPI:                                                                                              

04/15                                                                                             

15:57 This 53 yrs old  Male presents to ER via Ambulatory with complaints of Back    kb  

      Pain.                                                                                       

15:57 The patient presents with pain that is acute. The symptoms are located in the mid back  kb  

      area. Onset: The symptoms/episode began/occurred 1 week(s) ago. The pain does not           

      radiate. Associated signs and symptoms: The patient has no apparent associated signs or     

      symptoms. The problem was sustained when lifting heavy object. Modifying factors: The       

      patient symptoms are alleviated by nothing, the patient symptoms are aggravated by any      

      movement. Severity of symptoms: At their worst the symptoms were moderate, in the           

      emergency department the symptoms are unchanged. The patient has not experienced            

      similar symptoms in the past. Pt returned for continued pain to mid back. States he was     

      lifting something heavy at work a week ago and hurt his back. This is his third visit       

      to the ER for same pain. Chest x-ray done yesterday with incidental finding of              

      pneumonia, put on zithromax. States he went to work last night, came home and took          

      gabapentin and a muscle relaxer. Was trying to lay down pta and felt a sharp pain/spasm     

      to mid back. States the pain is in his spine and radiates out. Massage decreases the        

      pain. .                                                                                     

                                                                                                  

Historical:                                                                                       

- Allergies:                                                                                      

15:07 No Known Allergies;                                                                     ss  

- Home Meds:                                                                                      

15:07 prednisone 10 mg Oral tab once daily [Active]; gabapentin oral oral [Active];           ss  

15:07 orphenadrine citrate 100 mg oral TbER 1 tab 2 times per day [Active];                   ss  

- PMHx:                                                                                           

15:07 COPD; Kidney stones; low back pain; Lupus;                                              ss  

- PSHx:                                                                                           

15:07 eye surgery many years ago; Hernia repair;                                              ss  

                                                                                                  

- Immunization history:: Adult Immunizations unknown.                                             

- Social history:: Smoking status: Patient/guardian denies using tobacco.                         

- Ebola Screening: : Patient denies exposure to infectious person Patient denies travel           

  to an Ebola-affected area in the 21 days before illness onset.                                  

                                                                                                  

                                                                                                  

ROS:                                                                                              

15:55 Constitutional: Negative for fever, chills, and weight loss, Cardiovascular: Negative   kb  

      for chest pain, palpitations, and edema, Respiratory: Negative for shortness of breath,     

      cough, wheezing, and pleuritic chest pain, Abdomen/GI: Negative for abdominal pain,         

      nausea, vomiting, diarrhea, and constipation, : Negative for injury, bleeding,            

      discharge, and swelling, MS/Extremity: Negative for injury and deformity, Skin:             

      Negative for injury, rash, and discoloration, Neuro: Negative for headache, weakness,       

      numbness, tingling, and seizure.                                                            

15:55 Back: Positive for pain at rest, pain with movement, of the mid back area.                  

                                                                                                  

Exam:                                                                                             

15:55 Constitutional:  This is a well developed, well nourished patient who is awake, alert,  kb  

      and in no acute distress. Head/Face:  Normocephalic, atraumatic. Chest/axilla:  Normal      

      chest wall appearance and motion.  Nontender with no deformity.  No lesions are             

      appreciated. Cardiovascular:  Regular rate and rhythm with a normal S1 and S2.  No          

      gallops, murmurs, or rubs.  Normal PMI, no JVD.  No pulse deficits. Respiratory:  Lungs     

      have equal breath sounds bilaterally, clear to auscultation and percussion.  No rales,      

      rhonchi or wheezes noted.  No increased work of breathing, no retractions or nasal          

      flaring. Abdomen/GI:  Soft, non-tender, with normal bowel sounds.  No distension or         

      tympany.  No guarding or rebound.  No evidence of tenderness throughout. Skin:  Warm,       

      dry with normal turgor.  Normal color with no rashes, no lesions, and no evidence of        

      cellulitis. MS/ Extremity:  Pulses equal, no cyanosis.  Neurovascular intact.  Full,        

      normal range of motion. Neuro:  Awake and alert, GCS 15, oriented to person, place,         

      time, and situation.  Cranial nerves II-XII grossly intact.  Motor strength 5/5 in all      

      extremities.  Sensory grossly intact.  Cerebellar exam normal.  Normal gait.                

15:55 Back: pain, that is mild, of the  mid back area, ROM is painful, normal spinal              

      alignment noted.                                                                            

                                                                                                  

Vital Signs:                                                                                      

15:07  / 105; Pulse 98; Resp 18; Temp 98.4(TE); Pulse Ox 95% on R/A; Weight 93.89 kg;   ss  

      Height 6 ft. 1 in. (185.42 cm); Pain 9/10;                                                  

15:07 Body Mass Index 27.31 (93.89 kg, 185.42 cm)                                             ss  

                                                                                                  

MDM:                                                                                              

15:47 Patient medically screened.                                                             kb  

15:52 Data reviewed: vital signs, nurses notes. Data interpreted: Pulse oximetry: on room air kb  

      is 95 %. Interpretation: normal.                                                            

16:33 ED course: pt reports pain gets worse when he lays flat. unable to lay flat for CT at     

      this time. States "that pain medication you gave me yesterday made the pain go away and     

      I felt better so I think I will be able to do it when it kicks in.".                        

17:14 Counseling: I had a detailed discussion with the patient and/or guardian regarding: the kb  

      historical points, exam findings, and any diagnostic results supporting the                 

      discharge/admit diagnosis, the need for outpatient follow up, a family practitioner, to     

      return to the emergency department if symptoms worsen or persist or if there are any        

      questions or concerns that arise at home. ED course: Pt sleeping when I walked into         

      room. Pt comfortable until he tries to lay flat. Unable to lay flat due to increased        

      pain. CT from 19 reviewed. History and diagnostics discussed with ERP. Pt to be         

      discharged home to follow up with Dr Stevens. .                                              

                                                                                                  

Administered Medications:                                                                         

16:10 Drug: Norco (7.5 mg-325 mg) 1 tabs Route: PO;                                           aj1 

17:34 Follow up: Response: No adverse reaction                                                aj 

16:47 Drug: TORadol 60 mg Route: IM; Site: left gluteus;                                      aj1 

17:35 Follow up: Response: No adverse reaction                                                aj1 

                                                                                                  

                                                                                                  

Disposition:                                                                                      

                                                                                             

07:00 Co-signature as Attending Physician, Mitch Bello MD I agree with the assessment and  donnell 

      plan of care.                                                                               

                                                                                                  

Disposition:                                                                                      

04/15/19 17:16 Discharged to Home. Impression: Low back pain - mid back.                          

- Condition is Stable.                                                                            

- Discharge Instructions: Back Injury Prevention, Easy-to-Read, Back Pain, Adult,                 

  Easy-to-Read, Back Exercises, Easy-to-Read.                                                     

                                                                                                  

- Medication Reconciliation Form, Thank You Letter, Antibiotic Education, Prescription            

  Opioid Use form.                                                                                

- Follow up: Emergency Department; When: As needed; Reason: Worsening of condition.               

  Follow up: Private Physician; When: 2 - 3 days; Reason: Recheck today's complaints,             

  Continuance of care, Re-evaluation by your physician. Follow up: Campos Stevens MD;             

  When: 2 - 3 days; Reason: Recheck today's complaints.                                           

                                                                                                  

                                                                                                  

                                                                                                  

Signatures:                                                                                       

Dispatcher [employer]                           [facility]                                                 

Micha Dolores, FNP-C                 FNP-Valerie Williamson RN                     RN   aj1                                                  

Mitch Bello MD MD cha Smirch, Shelby, RN                      RN   ss                                                   

                                                                                                  

Corrections: (The following items were deleted from the chart)                                    

04/15                                                                                             

17:11 15:50 Thoracic Spine WO Cont+CT.RAD.BRZ ordered. [facility]                                   EDMS

17:16 17:16 04/15/2019 17:16 Discharged to Home. Impression: Low back pain - mid back.        kb  

      Condition is Stable. Forms are Medication Reconciliation Form, Thank You Letter,            

      Antibiotic Education, Prescription Opioid Use. Follow up: Emergency Department; When:       

      As needed; Reason: Worsening of condition. Follow up: Private Physician; When: 2 - 3        

      days; Reason: Recheck today's complaints, Continuance of care, Re-evaluation by your        

      physician. kb                                                                               

17:35 17:16 04/15/2019 17:16 Discharged to Home. Impression: Low back pain - mid back.        aj1 

      Condition is Stable. Discharge Instructions: Back Injury Prevention, Easy-to-Read, Back     

      Pain, Adult, Easy-to-Read, Back Exercises, Easy-to-Read. Forms are Medication               

      Reconciliation Form, Thank You Letter, Antibiotic Education, Prescription Opioid Use.       

      Follow up: Emergency Department; When: As needed; Reason: Worsening of condition.           

      Follow up: Private Physician; When: 2 - 3 days; Reason: Recheck today's complaints,         

      Continuance of care, Re-evaluation by your physician. Follow up: Campos Stevens; When: 2      

      - 3 days; Reason: Recheck today's complaints. kb                                            

                                                                                                  

**************************************************************************************************

## 2019-08-14 ENCOUNTER — HOSPITAL ENCOUNTER (EMERGENCY)
Dept: HOSPITAL 97 - ER | Age: 54
Discharge: HOME | End: 2019-08-14
Payer: COMMERCIAL

## 2019-08-14 DIAGNOSIS — Z87.442: ICD-10-CM

## 2019-08-14 DIAGNOSIS — R51: ICD-10-CM

## 2019-08-14 DIAGNOSIS — E86.0: Primary | ICD-10-CM

## 2019-08-14 DIAGNOSIS — F17.210: ICD-10-CM

## 2019-08-14 DIAGNOSIS — J44.9: ICD-10-CM

## 2019-08-14 LAB
BLD SMEAR INTERP: (no result)
BUN BLD-MCNC: 9 MG/DL (ref 7–18)
GLUCOSE SERPLBLD-MCNC: 109 MG/DL (ref 74–106)
HCT VFR BLD CALC: 49.3 % (ref 39.6–49)
LYMPHOCYTES # SPEC AUTO: 0.3 K/UL (ref 0.7–4.9)
MORPHOLOGY BLD-IMP: (no result)
PMV BLD: 10.1 FL (ref 7.6–11.3)
POTASSIUM SERPL-SCNC: 3.9 MMOL/L (ref 3.5–5.1)
RBC # BLD: 5.34 M/UL (ref 4.33–5.43)

## 2019-08-14 PROCEDURE — 36415 COLL VENOUS BLD VENIPUNCTURE: CPT

## 2019-08-14 PROCEDURE — 85025 COMPLETE CBC W/AUTO DIFF WBC: CPT

## 2019-08-14 PROCEDURE — 80048 BASIC METABOLIC PNL TOTAL CA: CPT

## 2019-08-14 PROCEDURE — 70450 CT HEAD/BRAIN W/O DYE: CPT

## 2019-08-14 NOTE — EDPHYS
Physician Documentation                                                                           

 [facility]                                                                 

Name: Makc Ramírez                                                                                    

Age: 54 yrs                                                                                       

Sex: Male                                                                                         

: 1965                                                                                   

MRN: [id number]                                                                                   

Arrival Date: 2019                                                                          

Time: 18:18                                                                                       

Account#: [id number]                                                                            

Bed 7                                                                                             

Private MD:                                                                                       

ED Physician Kareem Estes                                                                       

HPI:                                                                                              

                                                                                             

21:35 This 54 yrs old  Male presents to ER via Ambulatory with complaints of         gs  

      Diarrhea.                                                                                   

21:35 The patient presents to the emergency department with diarrhea, abdominal pain. Onset:  gs  

      The symptoms/episode began/occurred last night. Possible causes: bad food exposure. The     

      symptoms are aggravated by nothing. The symptoms are alleviated by nothing. Associated      

      signs and symptoms: Pertinent positives: abdominal pain, diarrhea, nausea. Severity of      

      symptoms: At their worst the symptoms were severe in the emergency department the           

      symptoms have improved moderately. The patient has experienced similar episodes in the      

      past, a few times. also complains of headache gradual onset located back of neck and        

      back of head, not worst ever, feels dehydrated..                                            

                                                                                                  

Historical:                                                                                       

- Allergies:                                                                                      

18:48 No Known Allergies;                                                                     aa5 

- Home Meds:                                                                                      

18:48 prednisone 10 mg Oral tab once daily [Active];                                          aa5 

19:40 gabapentin Oral [Active]; orphenadrine citrate 100 mg Oral TbER 1 tab 2 times per day   lp1 

      [Active];                                                                                   

- PMHx:                                                                                           

18:48 COPD; Kidney stones; low back pain; Lupus;                                              aa5 

- PSHx:                                                                                           

18:48 eye surgery many years ago; Hernia repair;                                              aa5 

                                                                                                  

- Immunization history:: Flu vaccine is not up to date.                                           

- Social history:: Smoking status: Patient uses tobacco products, denies chronic                  

  smoking, but will smoke occasionally.                                                           

- Ebola Screening: : No symptoms or risks identified at this time.                                

                                                                                                  

                                                                                                  

ROS:                                                                                              

21:35 All other systems are negative.                                                         gs  

                                                                                                  

Exam:                                                                                             

21:35 Head/Face:  Normocephalic, atraumatic. Eyes:  Pupils equal round and reactive to light, gs  

      extra-ocular motions intact.  Lids and lashes normal.  Conjunctiva and sclera are           

      non-icteric and not injected.  Cornea within normal limits.  Periorbital areas with no      

      swelling, redness, or edema. ENT:  Nares patent. No nasal discharge, no septal              

      abnormalities noted.  Tympanic membranes are normal and external auditory canals are        

      clear.  Oropharynx with no redness, swelling, or masses, exudates, or evidence of           

      obstruction, uvula midline.  Mucous membranes moist. Neck:  Trachea midline, no             

      thyromegaly or masses palpated, and no cervical lymphadenopathy.  Supple, full range of     

      motion without nuchal rigidity, or vertebral point tenderness.  No Meningismus.             

      Chest/axilla:  Normal chest wall appearance and motion.  Nontender with no deformity.       

      No lesions are appreciated. Cardiovascular:  Regular rate and rhythm with a normal S1       

      and S2.  No gallops, murmurs, or rubs.  Normal PMI, no JVD.  No pulse deficits.             

      Respiratory:  Lungs have equal breath sounds bilaterally, clear to auscultation and         

      percussion.  No rales, rhonchi or wheezes noted.  No increased work of breathing, no        

      retractions or nasal flaring. Abdomen/GI:  Soft, non-tender, with normal bowel sounds.      

      No distension or tympany.  No guarding or rebound.  No evidence of tenderness               

      throughout. Back:  No spinal tenderness.  No costovertebral tenderness.  Full range of      

      motion. Skin:  Warm, dry with normal turgor.  Normal color with no rashes, no lesions,      

      and no evidence of cellulitis. MS/ Extremity:  Pulses equal, no cyanosis.                   

      Neurovascular intact.  Full, normal range of motion. Neuro:  Awake and alert, GCS 15,       

      oriented to person, place, time, and situation.  Cranial nerves II-XII grossly intact.      

      Motor strength 5/5 in all extremities.  Sensory grossly intact.  Cerebellar exam            

      normal.  Normal gait.                                                                       

21:35 Constitutional: The patient appears alert, awake.                                           

                                                                                                  

Vital Signs:                                                                                      

18:48  / 96; Pulse 83; Resp 18 S; Temp 98.5(O); Pulse Ox 97% on R/A; Weight 90.72 kg    aa5 

      (R); Height 5 ft. 9 in. (175.26 cm) (R); Pain 5/10;                                         

21:05  / 92; Pulse 83; Resp 18; Temp 98.6(O); Pulse Ox 95% on R/A; Pain 2/10;           lp1 

18:48 Body Mass Index 29.53 (90.72 kg, 175.26 cm)                                             aa5 

                                                                                                  

MDM:                                                                                              

19:20 Patient medically screened.                                                             gs  

22:51 Differential diagnosis: Nonspecific abd pain, gastroenteritis, dehydration. Data        gs  

      reviewed: vital signs, nurses notes. ED course: pt requested ct wants to make sure she      

      has aneurysm.                                                                               

                                                                                                  

                                                                                             

19:15 Order name: CBC with Diff; Complete Time: 20:29                                           

                                                                                             

19:15 Order name: Basic Metabolic Panel; Complete Time: 20:29                                   

                                                                                             

19:47 Order name: CBC Smear Scan; Complete Time: 20:29                                        EDMS

                                                                                             

21:40 Order name: CT Head Brain wo Cont                                                       gs  

                                                                                                  

Administered Medications:                                                                         

19:39 Drug: NS 0.9% 1000 ml Route: IV; Rate: 1 bolus; Site: left antecubital;                 lp1 

19:39 Drug: TORadol - Ketorolac 15 mg Route: IVP; Site: left antecubital;                     lp1 

                                                                                                  

                                                                                                  

Disposition:                                                                                      

19 22:51 Discharged to Home. Impression: Diarrhea, unspecified, Dehydration, Headache.      

- Condition is Stable.                                                                            

- Discharge Instructions: Dehydration, Adult, Diarrhea, Adult, General Headache Without           

  Cause.                                                                                          

                                                                                                  

- Medication Reconciliation Form, Thank You Letter, Antibiotic Education, Prescription            

  Opioid Use form.                                                                                

- Follow up: Private Physician; When: 2 - 3 days; Reason: Re-evaluation by your                   

  physician.                                                                                      

                                                                                                  

                                                                                                  

                                                                                                  

Signatures:                                                                                       

Dispatcher [employer]                           EDShannan Gill, RN                     RN   aa5                                                  

Altagracia Fajardo RN                         RN   lp1                                                  

Kareem Estes MD MD                                                      

                                                                                                  

Corrections: (The following items were deleted from the chart)                                    

23:33 22:51 2019 22:51 Discharged to Home. Impression: Diarrhea, unspecified;           lp1 

      Dehydration; Headache. Condition is Stable. Forms are Medication Reconciliation Form,       

      Thank You Letter, Antibiotic Education, Prescription Opioid Use. Follow up: Private         

      Physician; When: 2 - 3 days; Reason: Re-evaluation by your physician. gs                    

                                                                                                  

**************************************************************************************************

## 2019-08-14 NOTE — ER
Nurse's Notes                                                                                     

 [facility]                                                                 

Name: Mack Ramírez                                                                                    

Age: 54 yrs                                                                                       

Sex: Male                                                                                         

: 1965                                                                                   

MRN: [id number]                                                                                   

Arrival Date: 2019                                                                          

Time: 18:18                                                                                       

Account#: [id number]                                                                            

Bed 7                                                                                             

Private MD:                                                                                       

Diagnosis: Diarrhea, unspecified;Dehydration;Headache                                             

                                                                                                  

Presentation:                                                                                     

                                                                                             

18:46 Presenting complaint: Patient states: "I ate some pepperoni last night and I think it   aa5 

      was questionable because I started with chills, nausea, diarrhea, and stomach cramps".      

      pt denies vomiting. Transition of care: patient was not received from another setting       

      of care. Onset of symptoms was 2019. Risk Assessment: Do you want to hurt        

      yourself or someone else? Patient reports no desire to harm self or others. Initial         

      Sepsis Screen: Does the patient meet any 2 criteria? No. Patient's initial sepsis           

      screen is negative. Does the patient have a suspected source of infection? No.              

      Patient's initial sepsis screen is negative. Care prior to arrival: None.                   

18:46 Acuity: VERN 3                                                                           aa5 

18:46 Method Of Arrival: Ambulatory                                                           aa5 

                                                                                                  

Historical:                                                                                       

- Allergies:                                                                                      

18:48 No Known Allergies;                                                                     aa5 

- Home Meds:                                                                                      

18:48 prednisone 10 mg Oral tab once daily [Active];                                          aa5 

19:40 gabapentin Oral [Active]; orphenadrine citrate 100 mg Oral TbER 1 tab 2 times per day   lp1 

      [Active];                                                                                   

- PMHx:                                                                                           

18:48 COPD; Kidney stones; low back pain; Lupus;                                              aa5 

- PSHx:                                                                                           

18:48 eye surgery many years ago; Hernia repair;                                              aa5 

                                                                                                  

- Immunization history:: Flu vaccine is not up to date.                                           

- Social history:: Smoking status: Patient uses tobacco products, denies chronic                  

  smoking, but will smoke occasionally.                                                           

- Ebola Screening: : No symptoms or risks identified at this time.                                

                                                                                                  

                                                                                                  

Screenin:32 Abuse screen: Denies threats or abuse. Denies injuries from another. Nutritional        lp1 

      screening: No deficits noted. Tuberculosis screening: No symptoms or risk factors           

      identified. Fall Risk None identified.                                                      

                                                                                                  

Assessment:                                                                                       

19:15 General: Appears in no apparent distress. Behavior is calm, cooperative, appropriate    lp1 

      for age. Pain: Complains of pain in right lower quadrant and left lower quadrant            

      Quality of pain is described as aching, crampy. Neuro: Level of Consciousness is awake,     

      alert, obeys commands, Reports headache frontal area, occipital area. Cardiovascular:       

      Patient's skin is warm and dry. Respiratory: Respiratory effort is even, unlabored. GI:     

      Abdomen is non-distended, Bowel sounds present X 4 quads. Reports diarrhea, Patient         

      currently denies vomiting. : No signs and/or symptoms were reported regarding the         

      genitourinary system. EENT: No signs and/or symptoms were reported regarding the EENT       

      system. Derm: Skin is intact, Skin is dry, Skin is normal. Musculoskeletal:                 

      Circulation, motion, and sensation intact.                                                  

21:06 Reassessment: Patient states headache resolved; Patient states feeling better. Patient  lp1 

      states symptoms have improved. Neuro: Level of Consciousness is awake, alert, obeys         

      commands. Respiratory: Respiratory effort is even, unlabored. Derm: Skin is normal.         

21:30 Reassessment: Patient aware of pending discharge, aware of pending discharge papers.    lp1 

22:00 Reassessment: Provider at bedside with patient; Patient aware of continued stay for     lp1 

      ordered CT.                                                                                 

22:29 Reassessment: Patient is alert, oriented x 3, equal unlabored respirations, skin        lp1 

      warm/dry/pink. Patient aware of pending CT results Patient denies pain at this time.        

                                                                                                  

Vital Signs:                                                                                      

18:48  / 96; Pulse 83; Resp 18 S; Temp 98.5(O); Pulse Ox 97% on R/A; Weight 90.72 kg    aa5 

      (R); Height 5 ft. 9 in. (175.26 cm) (R); Pain 5/10;                                         

21:05  / 92; Pulse 83; Resp 18; Temp 98.6(O); Pulse Ox 95% on R/A; Pain 2/10;           lp1 

18:48 Body Mass Index 29.53 (90.72 kg, 175.26 cm)                                             aa5 

                                                                                                  

ED Course:                                                                                        

18:18 Patient arrived in ED.                                                                  as  

18:47 Triage completed.                                                                       aa5 

18:47 Arm band placed on.                                                                     aa5 

19:02 Kareem Estes MD is Attending Physician.                                                

19:20 Altagracia Fajardo, RN is Primary Nurse.                                                       lp1 

19:30 Missed attempt(s): 20 gauge in left forearm. Bleeding controlled, band aid applied,     oe  

      catheter tip intact.                                                                        

19:32 Patient has correct armband on for positive identification. Call light in reach. Pulse  lp1 

      ox on. NIBP on.                                                                             

19:39 Inserted saline lock: 20 gauge in left antecubital area, using aseptic technique. Blood oe  

      collected.                                                                                  

21:07 No provider procedures requiring assistance completed.                                  lp1 

21:30 IV discontinued, No redness/swelling at site. Pressure dressing applied.                lp1 

22:05 CT Head Brain wo Cont In Process Unspecified.                                           EDMS

                                                                                                  

Administered Medications:                                                                         

19:39 Drug: NS 0.9% 1000 ml Route: IV; Rate: 1 bolus; Site: left antecubital;                 lp1 

19:39 Drug: TORadol - Ketorolac 15 mg Route: IVP; Site: left antecubital;                     lp1 

                                                                                                  

                                                                                                  

Outcome:                                                                                          

22:51 Discharge ordered by MD.                                                                gs  

23:33 Patient left the ED.                                                                    lp1 

                                                                                                  

Signatures:                                                                                       

Dispatcher [employer]                           EDMS                                                 

Marisa Holder Audri, RN                     RN   aa5                                                  

Altagracia Fajardo RN                         RN   lp1                                                  

Jose Manuel Falk Gregory, MD MD gs                                                   

                                                                                                  

Corrections: (The following items were deleted from the chart)                                    

19:39 19:15 Neuro: Level of Consciousness is awake, alert, obeys commands, lp1                lp1 

                                                                                                  

**************************************************************************************************

## 2019-08-15 NOTE — RAD REPORT
EXAM DESCRIPTION:  CT - Head Brain Wo Cont - 8/15/2019 1:50 am

 

CLINICAL HISTORY:  Headache.

 

COMPARISON:  None.

 

TECHNIQUE:  CT scan of the brain without   IV contrast. This exam was performed according to our depa
rtmental dose-optimization program, which includes automated exposure control, adjustment of the mA a
nd/or kV according to patient size and/or use of iterative reconstruction technique.

 

FINDINGS:  The ventricles, cisterns, and sulci are age-appropriate. No evidence of acute infarction, 
intracranial hemorrhage, extra-axial fluid collection, or midline shift. No air-fluid levels are seen
 in the paranasal sinuses to suggest acute sinusitis. No depressed skull fracture.

 

IMPRESSION:  No acute intracranial findings.

 

Electronically signed by:   Jaret Oh MD   8/14/2019 10:22 PM CDT Workstation: [id number]

 

 

 

Due to temporary technical issues with the PACS/Fluency reporting system, reports are being signed by
 the in house radiologist as a courtesy to ensure prompt reporting. The interpreting radiologist is f
ully responsible for the content of the report.

## 2019-08-18 ENCOUNTER — HOSPITAL ENCOUNTER (EMERGENCY)
Dept: HOSPITAL 97 - ER | Age: 54
Discharge: HOME | End: 2019-08-18
Payer: COMMERCIAL

## 2019-08-18 DIAGNOSIS — J44.9: ICD-10-CM

## 2019-08-18 DIAGNOSIS — K51.00: Primary | ICD-10-CM

## 2019-08-18 LAB
ALBUMIN SERPL BCP-MCNC: 3.3 G/DL (ref 3.4–5)
ALP SERPL-CCNC: 80 U/L (ref 45–117)
ALT SERPL W P-5'-P-CCNC: 14 U/L (ref 12–78)
AST SERPL W P-5'-P-CCNC: 17 U/L (ref 15–37)
BUN BLD-MCNC: 9 MG/DL (ref 7–18)
GLUCOSE SERPLBLD-MCNC: 108 MG/DL (ref 74–106)
HCT VFR BLD CALC: 50.8 % (ref 39.6–49)
LYMPHOCYTES # SPEC AUTO: 0.6 K/UL (ref 0.7–4.9)
PMV BLD: 10.4 FL (ref 7.6–11.3)
POTASSIUM SERPL-SCNC: 3.9 MMOL/L (ref 3.5–5.1)
RBC # BLD: 5.53 M/UL (ref 4.33–5.43)

## 2019-08-18 PROCEDURE — 85025 COMPLETE CBC W/AUTO DIFF WBC: CPT

## 2019-08-18 PROCEDURE — 96361 HYDRATE IV INFUSION ADD-ON: CPT

## 2019-08-18 PROCEDURE — 87177 OVA AND PARASITES SMEARS: CPT

## 2019-08-18 PROCEDURE — 36415 COLL VENOUS BLD VENIPUNCTURE: CPT

## 2019-08-18 PROCEDURE — 89055 LEUKOCYTE ASSESSMENT FECAL: CPT

## 2019-08-18 PROCEDURE — 82274 ASSAY TEST FOR BLOOD FECAL: CPT

## 2019-08-18 PROCEDURE — 87209 SMEAR COMPLEX STAIN: CPT

## 2019-08-18 PROCEDURE — 87493 C DIFF AMPLIFIED PROBE: CPT

## 2019-08-18 PROCEDURE — 74177 CT ABD & PELVIS W/CONTRAST: CPT

## 2019-08-18 PROCEDURE — 96365 THER/PROPH/DIAG IV INF INIT: CPT

## 2019-08-18 PROCEDURE — 80076 HEPATIC FUNCTION PANEL: CPT

## 2019-08-18 PROCEDURE — 87046 STOOL CULTR AEROBIC BACT EA: CPT

## 2019-08-18 PROCEDURE — 99284 EMERGENCY DEPT VISIT MOD MDM: CPT

## 2019-08-18 PROCEDURE — 80048 BASIC METABOLIC PNL TOTAL CA: CPT

## 2019-08-18 PROCEDURE — 87045 FECES CULTURE AEROBIC BACT: CPT

## 2019-08-18 NOTE — ER
Nurse's Notes                                                                                     

 [facility]                                                                 

Name: Mack Ramírez                                                                                    

Age: 54 yrs                                                                                       

Sex: Male                                                                                         

: 1965                                                                                   

MRN: [id number]                                                                                   

Arrival Date: 2019                                                                          

Time: 06:12                                                                                       

Account#: [id number]                                                                            

Bed 18                                                                                            

Private MD: Brayan Connors T                                                                        

Diagnosis: Pancolitis                                                                             

                                                                                                  

Presentation:                                                                                     

                                                                                             

06:21 Presenting complaint: Patient states: diarrhea and abd pain x 1 week. Transition of     aa1 

      care: patient was not received from another setting of care. Onset of symptoms was          

      2019. Risk Assessment: Do you want to hurt yourself or someone else? Patient     

      reports no desire to harm self or others. Initial Sepsis Screen: Does the patient meet      

      any 2 criteria? No. Patient's initial sepsis screen is negative. Does the patient have      

      a suspected source of infection? No. Patient's initial sepsis screen is negative. Care      

      prior to arrival: None.                                                                     

06:21 Method Of Arrival: Ambulatory                                                           aa1 

06:21 Acuity: VERN 3                                                                           aa1 

                                                                                                  

Triage Assessment:                                                                                

06:22 General: Appears in no apparent distress. comfortable, unkempt, Behavior is calm,       aa1 

      cooperative, appropriate for age.                                                           

                                                                                                  

Historical:                                                                                       

- Allergies:                                                                                      

06:22 No Known Allergies;                                                                     aa1 

- Home Meds:                                                                                      

06:22 prednisone 10 mg Oral tab once daily [Active];                                          aa1 

- PMHx:                                                                                           

06:22 COPD; Kidney stones; low back pain; Lupus;                                              aa1 

- PSHx:                                                                                           

06:22 eye surgery many years ago; Hernia repair;                                              aa1 

                                                                                                  

- Immunization history:: Flu vaccine is not up to date.                                           

- Social history:: Smoking status: Patient/guardian denies using tobacco.                         

- Ebola Screening: : No symptoms or risks identified at this time.                                

                                                                                                  

                                                                                                  

Screenin:29 Abuse screen: Denies threats or abuse. Denies injuries from another. Nutritional        rr5 

      screening: No deficits noted. Tuberculosis screening: No symptoms or risk factors           

      identified. Fall Risk IV access (20 points). Total Bruno Fall Scale indicates No Risk       

      (0-24 pts).                                                                                 

                                                                                                  

Assessment:                                                                                       

06:30 General: Appears in no apparent distress. uncomfortable, Behavior is calm, cooperative, rr5 

      appropriate for age.                                                                        

06:30 Pain: Complains of pain in abdomen Pain does not radiate. Pain currently is 7 out of 10 rr5 

      on a pain scale. Quality of pain is described as aching, Pain began gradually, Is           

      intermittent. Neuro: Level of Consciousness is awake, alert, obeys commands, Oriented       

      to person, place, time, situation, Appropriate for age. Cardiovascular: Capillary           

      refill < 3 seconds Patient's skin is warm and dry. Respiratory: Airway is patent            

      Respiratory effort is even, unlabored, Respiratory pattern is regular, symmetrical. GI:     

      Abdomen is flat, Reports lower abdominal pain, upper abdominal pain, diarrhea. : No       

      signs and/or symptoms were reported regarding the genitourinary system. EENT: No signs      

      and/or symptoms were reported regarding the EENT system. Derm: Skin is intact, Skin         

      temperature is warm. Musculoskeletal: Circulation, motion, and sensation intact.            

      Capillary refill < 3 seconds.                                                               

07:18 Reassessment: Patient appears in no apparent distress at this time. Patient and/or      em  

      family updated on plan of care and expected duration. Pain level reassessed. Patient is     

      alert, oriented x 3, equal unlabored respirations, skin warm/dry/pink.                      

07:45 Reassessment: obtained stool specimen, blood noted in loose stool, sent to lab.         em  

08:30 Reassessment: Patient appears in no apparent distress at this time. Patient and/or      em  

      family updated on plan of care and expected duration. Pain level reassessed. Patient is     

      alert, oriented x 3, equal unlabored respirations, skin warm/dry/pink. Patient denies       

      pain at this time. Patient states feeling better.                                           

09:30 Reassessment: Patient appears in no apparent distress at this time. Patient and/or      ph  

      family updated on plan of care and expected duration. Pain level reassessed. Patient is     

      alert, oriented x 3, equal unlabored respirations, skin warm/dry/pink. D/C pending          

      completion of IV antibiotics.                                                               

                                                                                                  

Vital Signs:                                                                                      

06:22  / 90; Pulse 95; Resp 20; Temp 98.3; Pulse Ox 96% on R/A; Weight 90.72 kg; Height aa1 

      5 ft. 9 in. (175.26 cm); Pain 7/10;                                                         

07:19  / 89; Pulse 78; Resp 18; Pulse Ox 99% on R/A;                                    em  

08:16  / 94; Pulse 77; Resp 16; Pulse Ox 99% on R/A; Pain 0/10;                         em  

09:30  / 87; Pulse 78; Resp 18; Pulse Ox 98% on R/A;                                    ph  

10:30  / 87; Pulse 75; Resp 18; Temp 97.8; Pulse Ox 97% on R/A;                         ph  

06:22 Body Mass Index 29.53 (90.72 kg, 175.26 cm)                                             aa1 

                                                                                                  

ED Course:                                                                                        

06:12 Patient arrived in ED.                                                                  am2 

06:12 Brayan Connors MD is Private Physician.                                                   am2 

06:15 [facility]                                                        snw 

06:15 Connor Escobar MD is Attending Physician.                                            snw 

06:20 John Osman RN is Primary Nurse.                                                    rr5 

06:22 Triage completed.                                                                       aa1 

06:22 Arm band placed on right wrist.                                                         aa1 

06:29 Inserted saline lock: 20 gauge in right forearm, using aseptic technique. Blood         rr5 

      collected.                                                                                  

06:32 Patient has correct armband on for positive identification. Placed in gown. Bed in low  rr5 

      position. Call light in reach. Side rails up X2. Pulse ox on. NIBP on.                      

07:54 CT completed. Patient tolerated procedure well. Patient moved back from CT.             bq  

07:58 CT Abd/Pelvis - IV Contrast Only In Process Unspecified.                                EDMS

08:41 Brayan Connors MD is Referral Physician.                                                  snw 

10:23 No provider procedures requiring assistance completed. IV discontinued, intact,         ph  

      bleeding controlled, No redness/swelling at site. Pressure dressing applied.                

                                                                                                  

Administered Medications:                                                                         

06:29 Drug: NS 0.9% 1000 ml Route: IV; Rate: 1 bolus; Site: right forearm;                    rr5 

07:38 Follow up: IV Status: Completed infusion; IV Intake: 1000ml                             em  

07:38 Drug: Bentyl 20 mg Route: PO;                                                           em  

08:20 Follow up: Response: No adverse reaction; Pain is decreased                             em  

08:18 Drug: NS 0.9% 1000 ml Route: IV; Rate: 125 ml/hr; Site: right forearm;                  em  

10:21 Follow up: Response: No adverse reaction; IV Status: Completed infusion                 ph  

08:20 Drug: Cipro 400 mg Volume: 200 ml; Route: IVPB; Infused Over: 60 mins; Site: right      em  

      forearm;                                                                                    

10:22 Follow up: Response: No adverse reaction; IV Status: Completed infusion                 ph  

09:07 Drug: Flagyl 500 mg Volume: 100 ml; Route: IVPB; Rate: 200 ml/hr; Infused Over: 30      em  

      mins; Site: right forearm;                                                                  

10:22 Follow up: Response: No adverse reaction; IV Status: Completed infusion                 ph  

                                                                                                  

                                                                                                  

Intake:                                                                                           

07:38 IV: 1000ml; Total: 1000ml.                                                              em  

                                                                                                  

Outcome:                                                                                          

08:42 Discharge ordered by MD. jerry 

10:24 Discharged to home ambulatory.                                                          ph  

10:24 Condition: good                                                                             

10:24 Discharge instructions given to patient, Instructed on discharge instructions, follow       

      up and referral plans. medication usage, Demonstrated understanding of instructions,        

      follow-up care, medications, Prescriptions given X 3.                                       

10:31 Patient left the ED.                                                                    ph  

                                                                                                  

Signatures:                                                                                       

Dispatcher [employer]                           EDPeggy Burgos RN                  RN   aa1                                                  

Fatemeh Reed, FNP-C                 FNP-Csnw                                                  

Keysha Castro Edgar, LVN                       LVN  Janell Licona RN RN   ph                                                   

Brianne Shelby Raymond RN                      RN   rr5                                                  

                                                                                                  

**************************************************************************************************

## 2019-08-18 NOTE — EDPHYS
Physician Documentation                                                                           

 [facility]                                                                 

Name: Mack Ramírez                                                                                    

Age: 54 yrs                                                                                       

Sex: Male                                                                                         

: 1965                                                                                   

MRN: [id number]                                                                                   

Arrival Date: 2019                                                                          

Time: 06:12                                                                                       

Account#: [id number]                                                                            

Bed 18                                                                                            

Private MD: Brayan Connors T                                                                        

ED Physician Connor Escobar                                                                     

HPI:                                                                                              

                                                                                             

07:33 This 54 yrs old  Male presents to ER via Ambulatory with complaints of         snw 

      Diarrhea.                                                                                   

07:33 The patient presents to the emergency department with diarrhea, 4 times today. Onset:   snw 

      The symptoms/episode began/occurred 1 week(s) ago, and became persistent. Possible          

      causes: unknown. The symptoms are aggravated by nothing. Associated signs and symptoms:     

      Pertinent positives: diarrhea. Severity of symptoms: At their worst the symptoms were       

      moderate in the emergency department the symptoms are unchanged. It is unknown whether      

      or not the patient has had similar symptoms in the past. The patient has been recently      

      seen by a physician: The patient has been recently seen at the [facility] Emergency Department, for similar complaints labs were performed, one         

      week ago, no improvement.                                                                   

                                                                                                  

Historical:                                                                                       

- Allergies:                                                                                      

06:22 No Known Allergies;                                                                     aa1 

- Home Meds:                                                                                      

06:22 prednisone 10 mg Oral tab once daily [Active];                                          aa1 

- PMHx:                                                                                           

06:22 COPD; Kidney stones; low back pain; Lupus;                                              aa1 

- PSHx:                                                                                           

06:22 eye surgery many years ago; Hernia repair;                                              aa1 

                                                                                                  

- Immunization history:: Flu vaccine is not up to date.                                           

- Social history:: Smoking status: Patient/guardian denies using tobacco.                         

- Ebola Screening: : No symptoms or risks identified at this time.                                

                                                                                                  

                                                                                                  

ROS:                                                                                              

07:33 Constitutional: Negative for fever, chills, and weight loss, Eyes: Negative for injury, snw 

      pain, redness, and discharge, ENT: Negative for injury, pain, and discharge, Neck:          

      Negative for injury, pain, and swelling, Cardiovascular: Negative for chest pain,           

      palpitations, and edema, Respiratory: Negative for shortness of breath, cough,              

      wheezing, and pleuritic chest pain, Back: Negative for injury and pain, : Negative        

      for injury, bleeding, discharge, and swelling, MS/Extremity: Negative for injury and        

      deformity, Skin: Negative for injury, rash, and discoloration, Neuro: Negative for          

      headache, weakness, numbness, tingling, and seizure.                                        

07:33 Abdomen/GI: Positive for abdominal pain, diarrhea, abdominal cramps, of the suprapubic      

      area, right lower quadrant and left lower quadrant.                                         

                                                                                                  

Exam:                                                                                             

07:33 Constitutional:  This is a well developed, well nourished patient who is awake, alert,  snw 

      and in no acute distress. Head/Face:  Normocephalic, atraumatic. Eyes:  Pupils equal        

      round and reactive to light, extra-ocular motions intact.  Lids and lashes normal.          

      Conjunctiva and sclera are non-icteric and not injected.  Cornea within normal limits.      

      Periorbital areas with no swelling, redness, or edema. ENT:  Nares patent. No nasal         

      discharge, no septal abnormalities noted.  Tympanic membranes are normal and external       

      auditory canals are clear.  Oropharynx with no redness, swelling, or masses, exudates,      

      or evidence of obstruction, uvula midline.  Mucous membranes moist. Neck:  Trachea          

      midline, no thyromegaly or masses palpated, and no cervical lymphadenopathy.  Supple,       

      full range of motion without nuchal rigidity, or vertebral point tenderness.  No            

      Meningismus. Chest/axilla:  Normal chest wall appearance and motion.  Nontender with no     

      deformity.  No lesions are appreciated. Cardiovascular:  Regular rate and rhythm with a     

      normal S1 and S2.  No gallops, murmurs, or rubs.  Normal PMI, no JVD.  No pulse             

      deficits. Respiratory:  Lungs have equal breath sounds bilaterally, clear to                

      auscultation and percussion.  No rales, rhonchi or wheezes noted.  No increased work of     

      breathing, no retractions or nasal flaring. Back:  No spinal tenderness.  No                

      costovertebral tenderness.  Full range of motion. Skin:  Warm, dry with normal turgor.      

      Normal color with no rashes, no lesions, and no evidence of cellulitis. MS/ Extremity:      

      Pulses equal, no cyanosis.  Neurovascular intact.  Full, normal range of motion. Neuro:     

       Awake and alert, GCS 15, oriented to person, place, time, and situation.  Cranial          

      nerves II-XII grossly intact.  Motor strength 5/5 in all extremities.  Sensory grossly      

      intact.  Cerebellar exam normal.  Normal gait. Psych:  Awake, alert, with orientation       

      to person, place and time.  Behavior, mood, and affect are within normal limits.            

07:33 Abdomen/GI: Inspection: abdomen appears normal, Bowel sounds: diminished, Palpation:        

      mild abdominal tenderness, in the suprapubic area, right lower quadrant and left lower      

      quadrant, no appreciated organomegaly.                                                      

                                                                                                  

Vital Signs:                                                                                      

06:22  / 90; Pulse 95; Resp 20; Temp 98.3; Pulse Ox 96% on R/A; Weight 90.72 kg; Height aa1 

      5 ft. 9 in. (175.26 cm); Pain 7/10;                                                         

07:19  / 89; Pulse 78; Resp 18; Pulse Ox 99% on R/A;                                    em  

08:16  / 94; Pulse 77; Resp 16; Pulse Ox 99% on R/A; Pain 0/10;                         em  

09:30  / 87; Pulse 78; Resp 18; Pulse Ox 98% on R/A;                                    ph  

10:30  / 87; Pulse 75; Resp 18; Temp 97.8; Pulse Ox 97% on R/A;                         ph  

06:22 Body Mass Index 29.53 (90.72 kg, 175.26 cm)                                             aa1 

                                                                                                  

MDM:                                                                                              

06:15 Patient medically screened.                                                             snw 

07:49 Data reviewed: vital signs, nurses notes. Data interpreted: Pulse oximetry: on room air snw 

      is 99 %. Interpretation: normal. Counseling: I had a detailed discussion with the           

      patient and/or guardian regarding: the historical points, exam findings, and any            

      diagnostic results supporting the discharge/admit diagnosis, the presence of at least       

      one elevated blood pressure reading (>120/80) during this emergency department visit,       

      lab results.                                                                                

                                                                                                  

                                                                                             

06:16 Order name: Basic Metabolic Panel; Complete Time: 06:57                                 snw 

                                                                                             

06:16 Order name: CBC with Diff; Complete Time: 07:23                                         snw 

                                                                                             

06:16 Order name: Hepatic Function; Complete Time: 06:57                                      snw 

                                                                                             

07:49 Order name: Stool Culture                                                               snw 

                                                                                             

07:49 Order name: Ova And Parasites                                                           snw 

                                                                                             

07:49 Order name: Occult Blood                                                                snw 

                                                                                             

07:31 Order name: CT Abd/Pelvis - IV Contrast Only; Complete Time: 08:40                      snw 

                                                                                             

07:49 Order name: Fecal Leukocyte Stain                                                       snw 

                                                                                             

07:49 Order name: CDIFF                                                                       snw 

                                                                                             

07:50 Order name: Stool Culture                                                               EDMS

                                                                                             

07:50 Order name: Ova and Parasites                                                           EDMS

                                                                                             

07:50 Order name: Occult Blood; Complete Time: 08:50                                          EDMS

                                                                                             

06:16 Order name: IV Saline Lock; Complete Time: 06:29                                        snw 

                                                                                             

06:16 Order name: Labs collected and sent; Complete Time: 06:29                               snw 

                                                                                                  

Administered Medications:                                                                         

06:29 Drug: NS 0.9% 1000 ml Route: IV; Rate: 1 bolus; Site: right forearm;                    rr5 

07:38 Follow up: IV Status: Completed infusion; IV Intake: 1000ml                             em  

07:38 Drug: Bentyl 20 mg Route: PO;                                                           em  

08:20 Follow up: Response: No adverse reaction; Pain is decreased                             em  

08:18 Drug: NS 0.9% 1000 ml Route: IV; Rate: 125 ml/hr; Site: right forearm;                  em  

10:21 Follow up: Response: No adverse reaction; IV Status: Completed infusion                 ph  

08:20 Drug: Cipro 400 mg Volume: 200 ml; Route: IVPB; Infused Over: 60 mins; Site: right      em  

      forearm;                                                                                    

10:22 Follow up: Response: No adverse reaction; IV Status: Completed infusion                 ph  

09:07 Drug: Flagyl 500 mg Volume: 100 ml; Route: IVPB; Rate: 200 ml/hr; Infused Over: 30      em  

      mins; Site: right forearm;                                                                  

10:22 Follow up: Response: No adverse reaction; IV Status: Completed infusion                 ph  

                                                                                                  

                                                                                                  

Disposition:                                                                                      

19 08:42 Discharged to Home. Impression: Pancolitis.                                        

- Condition is Stable.                                                                            

- Discharge Instructions: Food Choices to Help Relieve Diarrhea, Adult, Rehydration,              

  Adult, Colitis, Bloody Diarrhea.                                                                

- Prescriptions for Bentyl 20 mg Oral Tablet - take 1 tablet by ORAL route every 6                

  hours As needed; 20 tablet. Cipro 500 mg Oral Tablet - take 1 tablet by ORAL route              

  every 12 hours for 10 days; 20 tablet. Flagyl 500 mg Oral Tablet - take 1 tablet by             

  ORAL route every 8 hours for 10 days; 30 tablet.                                                

- Medication Reconciliation Form, Thank You Letter, Antibiotic Education, Prescription            

  Opioid Use, Work release form form.                                                             

- Follow up: Brayan Connors MD; When: 2 - 3 days; Reason: Recheck today's complaints,               

  Continuance of care, Re-evaluation by your physician. Follow up: Emergency                      

  Department; When: As needed; Reason: Worsening of condition.                                    

                                                                                                  

                                                                                                  

                                                                                                  

Addendum:                                                                                         

2019                                                                                        

     10:44 Co-signature as Attending Physician, Connor Escobar MD I agree with the assessment and t
w4

           plan of care.                                                                          

                                                                                                  

Signatures:                                                                                       

Dispatcher [employer]                           Peggy Amaro, RN                  RN   aa1                                                  

Fatemeh Reed, FNP-C                 FNP-Csnw                                                  

Doc Morales, LVN                       LVN  em                                                   

Janell Jimenez, RN                      RN                                                      

Connor Escobar MD MD   tw4                                                  

John Osman RN                      RN   rr5                                                  

                                                                                                  

Corrections: (The following items were deleted from the chart)                                    

                                                                                             

10:31 08:42 2019 08:42 Discharged to Home. Impression: Pancolitis. Condition is Stable. ph  

      Forms are Medication Reconciliation Form, Thank You Letter, Antibiotic Education,           

      Prescription Opioid Use. Follow up: Brayan Connors; When: 2 - 3 days; Reason: Recheck           

      today's complaints, Continuance of care, Re-evaluation by your physician. Follow up:        

      Emergency Department; When: As needed; Reason: Worsening of condition. snw                  

                                                                                                  

**************************************************************************************************

## 2019-08-18 NOTE — RAD REPORT
EXAM DESCRIPTION:  CT - Abdomen   Pelvis W Contrast - 8/18/2019 7:58 am

 

CLINICAL HISTORY:  Abdominal pain with diarrhea

 

COMPARISON:  4/19

 

TECHNIQUE:  Computed axial tomography of the abdomen pelvis was obtained. 100 cc Isovue-300 was admin
istered intravenously. Oral contrast was not requested which limits evaluation of bowel.

 

All CT scans are performed using dose optimization technique as appropriate and may include automated
 exposure control or mA/KV adjustment according to patient size.

 

FINDINGS:  Fatty liver

 

Spleen, pancreas, adrenal and kidneys appear unremarkable.

 

There is no evidence of diverticulitis. The wall of the entire colon is mildly to moderately thickene
d.

 

Small umbilical hernia. Mild diastasis rectus abdominis muscles

 

Bold thoracic and lumbar vertebral compression fractures. Moderate anterior subluxation of L5 on S1. 
Spondylosis L5

 

IMPRESSION:  Mild to moderate pancolitis

## 2021-12-09 ENCOUNTER — HOSPITAL ENCOUNTER (EMERGENCY)
Dept: HOSPITAL 97 - ER | Age: 56
Discharge: HOME | End: 2021-12-09
Payer: SELF-PAY

## 2021-12-09 VITALS — DIASTOLIC BLOOD PRESSURE: 85 MMHG | SYSTOLIC BLOOD PRESSURE: 136 MMHG

## 2021-12-09 VITALS — TEMPERATURE: 99.6 F

## 2021-12-09 VITALS — OXYGEN SATURATION: 95 %

## 2021-12-09 DIAGNOSIS — J44.9: ICD-10-CM

## 2021-12-09 DIAGNOSIS — J20.9: Primary | ICD-10-CM

## 2021-12-09 DIAGNOSIS — Z72.0: ICD-10-CM

## 2021-12-09 PROCEDURE — 71045 X-RAY EXAM CHEST 1 VIEW: CPT

## 2021-12-09 PROCEDURE — 99284 EMERGENCY DEPT VISIT MOD MDM: CPT

## 2021-12-09 PROCEDURE — 93005 ELECTROCARDIOGRAM TRACING: CPT

## 2021-12-09 NOTE — RAD REPORT
EXAM DESCRIPTION:  RAD - Chest Single View - 12/9/2021 11:09 am

 

CLINICAL HISTORY:  COPD

 

COMPARISON:  Chest Pa And Lat (2 Views) dated 8/21/2019; Chest Single View dated 4/14/2019; Chest Sin
gle View dated 3/13/2019; Chest Pa And Lat (2 Views) dated 2/6/2019

 

FINDINGS:  Lines: None.

Lungs: Increasing a diffuse prominence of the pulmonary interstitium.

Pleural: No significant pleural effusions or pneumothorax.

Cardiac: Cardiomegaly.

Bones: No acute fractures.

Other:

 

IMPRESSION:  Increasing prominence of the pulmonary interstitium may reflect either interstitial hong
a or a mild infectious or inflammatory process.

## 2021-12-09 NOTE — XMS REPORT
Continuity of Care Document

                           Created on:2021



Patient:REMY ROACH CHETAN

Sex:Male

:1965

External Reference #:[id number]





Demographics







                          Address                   [address]



                                                    [place] [address]

 

                          Home Phone                [phone]

 

                          Work Phone                [phone]

 

                          Mobile Phone              [phone]

 

                          Email Address             [email or web address]

 

                          Preferred Language        en

 

                          Marital Status            Unknown

 

                          [employer]ar[employer] Affiliation     Unknown

 

                          Race                      Unknown

 

                          Additional Race(s)        Unavailable

 

                          Ethnic Group              Not  or 









Author







                          Organization              [facility]

t

 

                          Address                   [address]



                                                    [place] [address]

 

                          Phone                     [phone]









Support







                Name            Relationship    Address         Phone

 

                Darrell            Spouse          [phone]

[phone]



                                                [place] [address] 









Care Team Providers







                    Name                Role                Phone

 

                    RUBY Granados Jr.      Primary Care Physician [phone]

 

                    Camden MILLAN,  JANET       Attending Clinician [phone]

 

                    Doctor Unassigned,  Name Attending Clinician Unavailable

 

                    Daniel PEREYRA           Attending Clinician Unavailable

 

                    [facility]  Attending Clinician Unavailable

 

                    Sugar TEJEDA           Attending Clinician [phone]









Payers







           Payer Name Policy Type Policy Number Effective Date Expiration Date S

ource







Problems







       Condition Condition Condition Status Onset  Resolution Last   Treating Co

mments 

Source



       Name   Details Category        Date   Date   Treatment Clinician        



                                                 Date                 

 

       No known No known Disease                                           Unive

rs



       active active                                                  ity of



       problems problems                                                  [facility]







Allergies, Adverse Reactions, Alerts







       Allergy Allergy Status Severity Reaction(s) Onset  Inactive Treating Comm

ents 

Source



       Name   Type                        Date   Date   Clinician        

 

       NO KNOWN Drug   Active                                           [facility]



       ALLERGIE Class                                                   ity of



       S                                                              [facility]







Social History







           Social Habit Start Date Stop Date  Quantity   Comments   Source

 

           Tobacco use and 2021 Never used            [facility]

y of



           exposure   00:00:00   00:00:00                         [facility]

 

           Alcohol intake 2021 Current               University

 of



                      00:00:00   00:00:00   non-drinker of            [place] [facility]

sharona



                                            alcohol               Branch



                                            (finding)             

 

           Sex Assigned At 1965                       [facility]

y of



           Birth      00:00:00   00:00:00                         [facility]









                Smoking Status  Start Date      Stop Date       Source

 

                Current every day smoker 2021 00:00:00                 Uni

versity of [facility]







Medications







       Ordered Filled Start  Stop   Current Ordering Indication Dosage Frequency

 Signature

                    Comments            Components          Source



     Medication Medication Date Date Medication? Clinician                (SIG) 

          



     Name Name                                                   

 

     predniSONE      2021- No        [id number]           Take 1          

 Univers



     5 mg tablet                                tablet by           it

y of



               00:00: 04:59                          mouth           [place]



               00   :00                           daily for           Medical



                                                  7 days,           Branch



                                                  THEN 0.5           



                                                  tablets           



                                                  daily for           



                                                  7 days.           

 

     predniSONE      -2021- No        [id number]           Take 1          

 Univers



     5 mg tablet                                tablet by           it

y of



               00:00: 04:59                          mouth           [place]



               00   :00                           daily for           Medical



                                                  7 days,           Branch



                                                  THEN 0.5           



                                                  tablets           



                                                  daily for           



                                                  7 days.           

 

     predniSONE      -2021- No             20mg      Take 20 mg           

Univers



     (DELTASONE)      8-25 08-25                          by mouth           ity

 of



     20 mg      21:01: 00:00                          daily.           [place]



     tablet      23   :                                          [facility]

 

     predniSONE      -2021- No             20mg      Take 20 mg           

Univers



     (DELTASONE)      8-25 08-25                          by mouth           ity

 of



     20 mg      21:01: 00:00                          daily.           [place]



     tablet      23   :                                          [facility]

 

     predniSONE      2021- No        [id number]           Take 0.5        

   [facility]



     20 mg      8-25 -                          tablets by           ity of



     tablet      00:00: 04:59                          mouth 2           [place]



               00   :00                           (two)           [facility]



                                                  daily for           



                                                  7 days,           



                                                  THEN 0.5           



                                                  tablets           



                                                  daily for           



                                                  7 days.           

 

     predniSONE      2021- No        [id number]           Take 0.5        

   [facility]



     20 mg      8-25 -                          tablets by           ity of



     tablet      00:00: 04:59                          mouth 2           [place]



               00   :00                           (two)           [facility]



                                                  daily for           



                                                  7 days,           



                                                  THEN 0.5           



                                                  tablets           



                                                  daily for           



                                                  7 days.           

 

     methylPREDN      2018-0      Yes            84mg      Take 21           Uni

vers



     ISolone      5-07                               tablets by           ity of



     (MEDROL,      00:00:                               mouth           [place]



     TEGAN,) 4 mg      00                                 SEE-INSTRU           Med

ical



     tablets                                         CTIONS.           Branch



                                                  follow           



                                                  package           



                                                  directions           

 

     methylPREDN      2018-0      Yes            84mg      Take 21           Uni

vers



     ISolone      5-07                               tablets by           ity of



     (MEDROL,      00:00:                               mouth           [place]



     TEGAN,) 4 mg      00                                 SEE-INSTRU           Med

ical



     tablets                                         CTIONS.           Branch



                                                  follow           



                                                  package           



                                                  directions           

 

     methylPREDN      2018-0      Yes            84mg      Take 21           Uni

vers



     ISolone      5-07                               tablets by           ity of



     (MEDROL,      00:00:                               mouth           [place]



     TEGAN,) 4 mg      00                                 SEE-INSTRU           Med

ical



     tablets                                         CTIONS.           Branch



                                                  follow           



                                                  package           



                                                  directions           

 

     methylPREDN      2018-0      Yes            84mg      Take 21           Uni

vers



     ISolone      5-07                               tablets by           ity of



     (MEDROL,      00:00:                               mouth           [place]



     TEGAN,) 4 mg      00                                 SEE-INSTRU           Med

ical



     tablets                                         CTIONS.           Branch



                                                  follow           



                                                  package           



                                                  directions           

 

     methylPREDN      -0 - No             84mg      Take 21           Un

radha



     ISolone      5-07 08-25                          tablets by           ity o

f



     (MEDROL,      00:00: 00:00                          mouth           [place]



     TEGAN,) 4 mg      00   :00                           SEE-INSTRU           Med

ical



     tablets                                         CTIONS.           Branch



                                                  follow           



                                                  package           



                                                  directions           

 

     methylPREDN      -0 - No             84mg      Take 21           Un

radha



     ISolone      5-07 08-25                          tablets by           ity o

f



     (MEDROL,      00:00: 00:00                          mouth           [place]



     TEGAN,) 4 mg      00   :00                           SEE-INSTRU           Med

ical



     tablets                                         CTIONS.           Branch



                                                  follow           



                                                  package           



                                                  directions           

 

     predniSONE      2018-0      Yes            20mg      Take 20 mg           U

nivers



     (DELTASONE)      4-12                               by mouth           ity 

of



     20 mg      14:00:                               daily.           [address]

 

     predniSONE      2018-0      Yes            20mg      Take 20 mg           U

nivers



     (DELTASONE)      4-12                               by mouth           ity 

of



     20 mg      14:00:                               daily.           [address]

 

     predniSONE      2018-0      Yes            20mg      Take 20 mg           U

nivers



     (DELTASONE)      4-12                               by mouth           ity 

of



     20 mg      14:00:                               daily.           [address]

 

     predniSONE      2018-0      Yes            20mg      Take 20 mg           U

nivers



     (DELTASONE)      4-12                               by mouth           ity 

of



     20 mg      14:00:                               daily.           [address]

 

     traMADOL 50      2018-0      Yes            50mg      Take 1           Univ

ers



     mg tablet      2-19                               tablet by           ity o

f



               00:00:                               mouth           [place]



                                                every 4           Medical



                                                  (four)           Branch



                                                  hours as           



                                                  needed for           



                                                  Pain           



                                                  (scale           



                                                  4-6) or           



                                                  Pain           



                                                  unrelieved           



                                                  by             



                                                  non-narcot           



                                                  ic             



                                                  analgesics           



                                                  .              

 

     traMADOL 50      2018-0      Yes            50mg      Take 1           Univ

ers



     mg tablet      2-19                               tablet by           ity o

f



               00:00:                               mouth           [place]



                                                every 4           Medical



                                                  (four)           Branch



                                                  hours as           



                                                  needed for           



                                                  Pain           



                                                  (scale           



                                                  4-6) or           



                                                  Pain           



                                                  unrelieved           



                                                  by             



                                                  non-narcot           



                                                  ic             



                                                  analgesics           



                                                  .              

 

     traMADOL 50            Yes            50mg      Take 1           Univ

ers



     mg tablet      2-19                               tablet by           ity o

f



               00:00:                               mouth           [place]



               00                                 every 4           Medical



                                                  (four)           Branch



                                                  hours as           



                                                  needed for           



                                                  Pain           



                                                  (scale           



                                                  4-6) or           



                                                  Pain           



                                                  unrelieved           



                                                  by             



                                                  non-narcot           



                                                  ic             



                                                  analgesics           



                                                  .              

 

     traMADOL 50            Yes            50mg      Take 1           Univ

ers



     mg tablet      2-19                               tablet by           ity o

f



               00:00:                               mouth           [place]



               00                                 every 4           Medical



                                                  (four)           Branch



                                                  hours as           



                                                  needed for           



                                                  Pain           



                                                  (scale           



                                                  4-6) or           



                                                  Pain           



                                                  unrelieved           



                                                  by             



                                                  non-narcot           



                                                  ic             



                                                  analgesics           



                                                  .              

 

     traMADOL 50      2021- No             50mg      Take 1           Uni

vers



     mg tablet      2- 08-25                          tablet by           ity 

of



               00:00: 00:00                          mouth           [place]



               00   :00                           every 4           Medical



                                                  (four)           Branch



                                                  hours as           



                                                  needed for           



                                                  Pain           



                                                  (scale           



                                                  4-6) or           



                                                  Pain           



                                                  unrelieved           



                                                  by             



                                                  non-narcot           



                                                  ic             



                                                  analgesics           



                                                  .              

 

     traMADOL 50       No             50mg      Take 1           Uni

vers



     mg tablet      2--25                          tablet by           ity 

of



               00:00: 00:00                          mouth           [place]



               00   :00                           every 4           Medical



                                                  (four)           Branch



                                                  hours as           



                                                  needed for           



                                                  Pain           



                                                  (scale           



                                                  4-6) or           



                                                  Pain           



                                                  unrelieved           



                                                  by             



                                                  non-narcot           



                                                  ic             



                                                  analgesics           



                                                  .              

 

     acetaminoph            Yes                      1-2 by           [facility]



     en-codeine      2-02                               mouth           ity of



     (TYLENOL-CO      00:00:                               every 4-6           T

exas



     DEINE #3)      00                                 hours as           Medica

l



     300-30 mg                                         needed prn           B[facility]

ch



     tablet                                         pain           

 

     acetaminoph            Yes                      1-2 by           [facility]



     en-codeine      2-02                               mouth           ity of



     (TYLENOL-CO      00:00:                               every 4-6           T

exas



     DEINE #3)      00                                 hours as           Medica

l



     300-30 mg                                         needed prn           B[facility]

ch



     tablet                                         pain           

 

     acetaminoph            Yes                      1-2 by           [facility]

ers



     en-codeine      2-02                               mouth           ity of



     (TYLENOL-CO      00:00:                               every 4-6           T

exas



     DEINE #3)      00                                 hours as           Medica

l



     300-30 mg                                         needed prn           B[facility]

ch



     tablet                                         pain           

 

     acetaminoph            Yes                      1-2 by           [facility]



     en-codeine      2-02                               mouth           ity of



     (TYLENOL-CO      00:00:                               every 4-6           T

exas



     DEINE #3)      00                                 hours as           Medica

l



     300-30 mg                                         needed prn           B[facility]

ch



     tablet                                         pain           

 

     acetaminoph      2021- No                       1-2 by           Uni

vers



     en-codeine                                mouth           ity of



     (TYLENOL-CO      00:00: 00:00                          every 4-6           

[place]



     DEINE #3)      00   :00                           hours as           Medica

l



     300-30 mg                                         needed prn           B[facility]

ch



     tablet                                         pain           

 

     acetaminoph      2021- No                       1-2 by           Uni

vers



     en-codeine                                mouth           ity of



     (TYLENOL-CO      00:00: 00:00                          every 4-6           

[place]



     DEINE #3)      00   :00                           hours as           Medica

l



     300-30 mg                                         needed prn           Bran

ch



     tablet                                         pain           

 

     docusate            Yes            100mg      Take 1           Univer

s



     (COLACE)      4-20                               capsule by           ity o

f



     100 mg      00:00:                               mouth 3           [place]



     capsule      00                                 (three)           Medical



                                                  times           Branch



                                                  daily.           

 

     Polyethylen            Yes            1{packe      Take 1           U

nivers



     e Glycol      4-20                     t}        Packet by           ity of



     3350      00:00:                               mouth 2           [place]



     (MIRALAX)      00                                 (two)           Medical



     17 gram                                         times           Branch



     powder                                         daily.           

 

     docusate            Yes            100mg      Take 1           Univer

s



     (COLACE)      4-20                               capsule by           ity o

f



     100 mg      00:00:                               mouth 3           [place]



     capsule      00                                 (three)           Medical



                                                  times           Branch



                                                  daily.           

 

     Polyethylen            Yes            1{packe      Take 1           U

nivers



     e Glycol      4-20                     t}        Packet by           ity of



     3350      00:00:                               mouth 2           [place]



     (MIRALAX)      00                                 (two)           Medical



     17 gram                                         times           Branch



     powder                                         daily.           

 

     docusate      0      Yes            100mg      Take 1           Univer

s



     (COLACE)      4-20                               capsule by           ity o

f



     100 mg      00:00:                               mouth 3           [place]



     capsule      00                                 (three)           Medical



                                                  times           Branch



                                                  daily.           

 

     docusate      -0      Yes            100mg      Take 1           Univer

s



     (COLACE)      4-20                               capsule by           ity o

f



     100 mg      00:00:                               mouth 3           [place]



     capsule      00                                 (three)           Medical



                                                  times           Branch



                                                  daily.           

 

     Polyethylen            Yes            1{packe      Take 1           U

nivers



     e Glycol      4-20                     t}        Packet by           ity of



     3350      00:00:                               mouth 2           [place]



     (MIRALAX)      00                                 (two)           Medical



     17 gram                                         times           Branch



     powder                                         daily.           

 

     Polyethylen            Yes            1{packe      Take 1           U

nivers



     e Glycol      4-20                     t}        Packet by           ity of



     3350      00:00:                               mouth 2           [place]



     (MIRALAX)      00                                 (two)           Medical



     17 gram                                         times           Branch



     powder                                         daily.           

 

     docusate      2021- No             100mg      Take 1           Unive

rs



     (COLACE)      4-20 08-25                          capsule by           ity 

of



     100 mg      00:00: 00:00                          mouth 3           [place]



     capsule      00   :00                           (three)           Medical



                                                  times           Branch



                                                  daily.           

 

     Polyethylen      2021- No             1{packe      Take 1           

[facility]



     e Glycol      4-20 08-25                t}        Packet by           ity o

f



     3350      00:00: 00:00                          mouth 2           [place]



     (MIRALAX)      00   :00                           (two)           Medical



     17 gram                                         times           Branch



     powder                                         daily.           

 

     docusate      2021- No             100mg      Take 1           Unive

rs



     (COLACE)      4-20 08-25                          capsule by           ity 

of



     100 mg      00:00: 00:00                          mouth 3           [place]



     capsule      00   :00                           (three)           Medical



                                                  times           Branch



                                                  daily.           

 

     Polyethylen      2021- No             1{packe      Take 1           

[facility]



     e Glycol      4-20 08-25                t}        Packet by           ity o

f



     3350      00:00: 00:00                          mouth 2           [place]



     (MIRALAX)      00   :00                           (two)           Medical



     17 gram                                         times           Branch



     powder                                         daily.           







Vital Signs







             Vital Name   Observation Time Observation Value Comments     Source

 

             Heart rate   2021 20:25:00 75 /min                   [facility]

 

             Body temperature 2021 20:25:00 36.33 Ameena                 [facility]

 

             Respiratory rate 2021 20:25:00 18 /min                   [facility]

 

             Body weight  2021 20:25:00 90.719 kg                 [facility]

 

             BMI          2021 20:25:00 26.39 kg/m2               [facility]

 

             Oxygen saturation in 2021 20:25:00 95 /min                   

[facility]



             Arterial blood by                                        [place] [facility]

sharona



             Pulse oximetry                                        Branch

 

             Systolic blood 2021 20:25:00 149 mm[Hg]                [facility]

sity of



             pressure                                            [facility]

 

             Diastolic blood 2021 20:25:00 110 mm[Hg]                Unive

rsity of



             pressure                                            [facility]

 

             Systolic blood 2020 15:29:00 156 mm[Hg]                Univer

sity of



             pressure                                            [facility]

 

             Diastolic blood 2020 15:29:00 97 mm[Hg]                 Unive

rsity of



             pressure                                            [facility]

 

             Heart rate   2020 15:29:00 66 /min                   Universi

ty of



                                                                 [facility]

 

             Body temperature 2020 15:22:00 36.67 Ameena                 Univ

ersity of



                                                                 [facility]

 

             Respiratory rate 2020 15:22:00 18 /min                   [facility]

[facility]



                                                                 [facility]

 

             Body height  2020 15:22:00 185.4 cm                  Universi

ty of



                                                                 [facility]

 

             Body weight  2020 15:22:00 90.719 kg                 Universi

ty [facility]

 

             BMI          2020 15:22:00 26.39 kg/m2               [facility]

 

             Oxygen saturation in 2020 15:22:00 95 /min                   

[facility]



             Arterial blood by                                        [place] [facility]

sharona



             Pulse oximetry                                        Branch







Procedures







                Procedure       Date / Time Performed Performing Clinician [facility]

e

 

                CONSENT/REFUSAL FOR 2021 20:11:27 Doctor Unassigned, No Un

[facility]



                DIAGNOSIS AND                   Name            Medical Branch



                TREATMENT                                       







Encounters







        Start   End     Encounter Admission Attending Care    Care    Encounter 

Source



        Date/Time Date/Time Type    Type    Clinicians Facility Department ID   

   

 

        2021         Emergency                 [facility]    [id number] 

Univers



        18:06:45                                                         ity of



                                                                        [facility]

 

        2021 Emergency         [facility]    [id number]

[id number] Univers



        15:27:00 16:34:00                 Annemarie Harper [id number]         

ity of



                                                [place] [id number]         [facility]  [phone]         Medi

sharona



                                                        [address]             Branch

 

        2021 Orders          Doctor CHAPMAN    [id number]

[id number] Univers



        00:00:00 00:00:00 Only            Unassigned, DORIS   [id number]       

  ity of



                                        [facility] [id number]         [place]

as



                                                        [phone]         Medi

sharona



                                                        [address]             Branch

 

        2020 Telephone         ADELA Sanchez    [id number]

[id number] Univers



        00:00:00 00:00:00                 Chiquis GEORGE   [id number]         it

y of



                                                [facility] [id number]         [place]

as



                                                        [phone]         [address]

 

        2020 Urgent          [facility]    1.

[id number] 

Univers



        10:06:34 10:26:34 [facility]            [facility]  [id number]    

     ity [facility] [id number]         [place]

as



                                                Aidan [phone]         [facility]     [address]             Branch



                                                Office                  



                                                Building                 



                                                One                     

 

        2020 Outpatient R               [facility]    [id number]

[id number] Univers



        10:00:00 10:00:00                                                 ity of



                                                                        [facility]







Results

This patient has no known results.

## 2021-12-09 NOTE — ER
Nurse's Notes                                                                                     

 [facility]                                                                 

Name: Mack Ramírez                                                                                    

Age: 56 yrs                                                                                       

Sex: Male                                                                                         

: 1965                                                                                   

MRN: [id number]                                                                                   

Arrival Date: 2021                                                                          

Time: 09:48                                                                                       

Account#: [id number]                                                                            

Bed 7                                                                                             

Private MD:                                                                                       

Diagnosis: Acute bronchitis, unspecified                                                          

                                                                                                  

Presentation:                                                                                     

                                                                                             

09:57 Chief complaint: Patient states: SOB, nonradiating, midsternal chest pain x 3 days, no  [facility] 

      insurance so rheumatologist wont refill medication for lupus, haven't had medication in     

      5 days. Coronavirus screen: shortness of breath, Client presents with at least one sign     

      or symptom that may indicate coronavirus-19. Standard/surgical mask placed on the           

      client. Provider contacted for isolation considerations. Ebola Screen: No symptoms or       

      risks identified at this time. Initial Sepsis Screen: Does the patient meet any 2           

      criteria? No. Patient's initial sepsis screen is negative. Does the patient have a          

      suspected source of infection? No. Patient's initial sepsis screen is negative. Risk        

      Assessment: Do you want to hurt yourself or someone else? Patient reports no desire to      

      harm self or others. Onset of symptoms was 2021. Care prior to arrival:        

      None.                                                                                       

09:57 Method Of Arrival: Ambulatory                                                           [facility] 

09:57 Acuity: VERN 2                                                                           jl7 

                                                                                                  

Triage Assessment:                                                                                

10:00 General: Appears in no apparent distress. uncomfortable, Behavior is cooperative. Pain: jl7 

      Complains of pain in mid-sternal area Pain does not radiate. Pain currently is 4 out of     

      10 on a pain scale. Quality of pain is described as pressure, Pain began 2-3 days ago.      

      Cardiovascular: Patient's skin is warm and dry. Respiratory: Airway is patent               

      Respiratory effort is even, labored, Respiratory pattern is symmetrical, tachypnea.         

                                                                                                  

Historical:                                                                                       

- Allergies:                                                                                      

10:00 No Known Allergies;                                                                     jl7 

- Home Meds:                                                                                      

10:00 prednisone 10 mg Oral tab once daily [Active];                                          jl7 

- PMHx:                                                                                           

10:00 COPD; Kidney stones; low back pain; Lupus;                                              jl7 

                                                                                                  

- Immunization history:: Client reports having NOT received the Covid vaccine.                    

- Social history:: Smoking status: Patient reports the use of cigarette tobacco                   

  products.                                                                                       

                                                                                                  

                                                                                                  

Screening:                                                                                        

10:03 Abuse screen: Denies threats or abuse. Nutritional screening: No deficits noted.        tw2 

      Tuberculosis screening: No symptoms or risk factors identified. Fall Risk None              

      identified.                                                                                 

                                                                                                  

Assessment:                                                                                       

10:06 Reassessment: provider at bedside at this time.                                         tw2 

10:22 General: Appears in no apparent distress. slender, well groomed, Behavior is calm,      tw2 

      cooperative, appropriate for age. Pain: Complains of pain in chest. Neuro: Neuro: Level     

      of Consciousness is awake, alert, obeys commands, Oriented to person, place, time,          

      situation. Cardiovascular: Reports chest pain, shortness of breath, since 3 days now        

      because "keysha been without my medication for lupus and my copd seems to be getting           

      worse". Respiratory: Airway is patent Respiratory effort is even, unlabored,                

      Respiratory pattern is regular, symmetrical, tachypnea. Respiratory: Denies cough. GI:      

      No signs and/or symptoms were reported involving the gastrointestinal system.               

      Musculoskeletal: Range of motion: intact in all extremities.                                

10:53 Reassessment: xray at bedside at this time.                                             tw2 

10:54 Reassessment: Patient appears in no apparent distress at this time. No changes from     tw2 

      previously documented assessment. Patient and/or family updated on plan of care and         

      expected duration. Pain level reassessed.                                                   

11:44 Reassessment: Patient appears in no apparent distress at this time. No changes from     tw2 

      previously documented assessment. Patient and/or family updated on plan of care and         

      expected duration. Pain level reassessed.                                                   

                                                                                                  

Vital Signs:                                                                                      

09:57  / 90; Pulse 68; Resp 22; Temp 99.6; Pulse Ox 94% ; Weight 108.86 kg; Height 6    jl7 

      ft. 1 in. (185.42 cm); Pain 4/10;                                                           

10:09 Pulse Ox 95% on R/A;                                                                    tw2 

10:53  / 82; Pulse 93; Resp 20; Pulse Ox 95% on R/A;                                    tw2 

11:31  / 85; Pulse 86; Resp 17; Pulse Ox 95% on R/A;                                    tw2 

09:57 Body Mass Index 31.66 (108.86 kg, 185.42 cm)                                            jl7 

                                                                                                  

ED Course:                                                                                        

09:48 Patient arrived in ED.                                                                  ds1 

10:00 Triage completed.                                                                       jl7 

10:00 Arm band placed on right wrist.                                                         jl7 

10:03 Shonda Law MD is Attending Physician.                                                sp3 

10:03 Bed in low position. Call light in reach. Cardiac monitor on. Pulse ox on. NIBP on.     tw2 

10:06 Lluvia Bullock, RN is Primary Nurse.                                                        tw2 

10:09 Patient maintains SpO2 saturation greater than 95% on room air.                         tw2 

11:08 Chest Single View XRAY In Process Unspecified.                                          EDMS

11:44 No provider procedures requiring assistance completed. Patient did not have IV access   tw2 

      during this emergency room visit.                                                           

                                                                                                  

Administered Medications:                                                                         

10:22 Drug: predniSONE 10 mg Route: PO;                                                       tw2 

10:54 Follow up: Response: No adverse reaction                                                tw2 

                                                                                                  

                                                                                                  

Outcome:                                                                                          

11:29 Discharge ordered by MD.                                                                sp3 

11:44 Discharged to home ambulatory.                                                          tw2 

11:44 Condition: stable                                                                           

11:44 Discharge instructions given to patient, Instructed on discharge instructions, follow       

      up and referral plans. medication usage, Demonstrated understanding of instructions,        

      follow-up care, medications.                                                                

11:46 Patient left the ED.                                                                    tw2 

                                                                                                  

Signatures:                                                                                       

Dispatcher [employer]                           EDMS                                                 

Arielle Gamboa                                ds1                                                  

Lluvia Bullock RN                          RN   tw2                                                  

Steph Raines RN                        RN   jl7                                                  

Shonda Law MD MD   sp3                                                  

                                                                                                  

Corrections: (The following items were deleted from the chart)                                    

10:28 10:22 Cardiovascular: Reports chest pain, shortness of breath, tw2                      tw2 

                                                                                                  

**************************************************************************************************

## 2021-12-09 NOTE — EDPHYS
Physician Documentation                                                                           

 [facility]                                                                 

Name: Mack Ramírez                                                                                    

Age: 56 yrs                                                                                       

Sex: Male                                                                                         

: 1965                                                                                   

MRN: [id number]                                                                                   

Arrival Date: 2021                                                                          

Time: 09:48                                                                                       

Account#: [id number]                                                                            

Bed 7                                                                                             

Private MD:                                                                                       

ED Physician Shonda Law                                                                         

HPI:                                                                                              

                                                                                             

10:20 This 56 yrs old Male presents to ER via Ambulatory with complaints of Needing           sp3 

      prednisone refill.                                                                          

10:20 56-year-old male with a history of COPD, lupus, kidney stones presents with chief       sp3 

      complaint needing a prednisone refill secondary to running out. Patient is been on 10       

      mg prednisone for "years". Patient complains of mild wheezing that he normally gets and     

      a headache which he normally gets when he delays his prednisone or misses a dose on         

      accident. His last dose was 3 days ago. Patient is not having chest pain per se, pain       

      in his left arm, or any other new findings. Patient states he would not of come into        

      the ED but only received it is because he had no other option to get the prednisone.        

      Review of systems is otherwise negative..                                                   

                                                                                                  

Historical:                                                                                       

- Allergies:                                                                                      

10:00 No Known Allergies;                                                                     jl7 

- Home Meds:                                                                                      

10:00 prednisone 10 mg Oral tab once daily [Active];                                          jl7 

- PMHx:                                                                                           

10:00 COPD; Kidney stones; low back pain; Lupus;                                              jl7 

                                                                                                  

- Immunization history:: Client reports having NOT received the Covid vaccine.                    

- Social history:: Smoking status: Patient reports the use of cigarette tobacco                   

  products.                                                                                       

                                                                                                  

                                                                                                  

ROS:                                                                                              

10:29 Constitutional: Negative for fever, chills, and weight loss, Eyes: Negative for injury, sp3 

      pain, redness, and discharge, ENT: Negative for injury, pain, and discharge, Neck:          

      Negative for injury, pain, and swelling, Cardiovascular: Negative for chest pain,           

      palpitations, and edema, Abdomen/GI: Negative for abdominal pain, nausea, vomiting,         

      diarrhea, and constipation, Back: Negative for injury and pain, MS/Extremity: Negative      

      for injury and deformity, Skin: Negative for injury, rash, and discoloration, Neuro:        

      Negative for headache, weakness, numbness, tingling, and seizure, Psych: Negative for       

      depression, anxiety, suicide ideation, homicidal ideation, and hallucinations,              

      Allergy/Immunology: Negative for hives, rash, and allergies, Endocrine: Negative for        

      neck swelling, polydipsia, polyuria, polyphagia, and marked weight changes,                 

      Hematologic/Lymphatic: Negative for swollen nodes, abnormal bleeding, and unusual           

      bruising.                                                                                   

10:29 All other systems are negative.                                                             

                                                                                                  

Exam:                                                                                             

10:30 Constitutional:  This is a well developed, well nourished patient who is awake, alert,  sp3 

      and in no acute distress. Head/Face:  Normocephalic, atraumatic. Eyes:  Pupils equal        

      round and reactive to light, extra-ocular motions intact.  Lids and lashes normal.          

      Conjunctiva and sclera are non-icteric and not injected.  Cornea within normal limits.      

      Periorbital areas with no swelling, redness, or edema. ENT:  Nares patent. No nasal         

      discharge, no septal abnormalities noted.  External auditory canals are clear.              

      Oropharynx with no redness, swelling, or masses, exudates, or evidence of obstruction,      

      uvula midline.  Mucous membranes moist. Neck:  Trachea midline, no thyromegaly or           

      masses palpated, and no cervical lymphadenopathy.  Supple, full range of motion without     

      nuchal rigidity, or vertebral point tenderness.  No Meningismus. Chest/axilla:  Normal      

      chest wall appearance and motion.  Nontender with no deformity.  No lesions are             

      appreciated. Cardiovascular:  Regular rate and rhythm with a normal S1 and S2.  No          

      gallops, murmurs, or rubs.  Normal PMI, no JVD.  No pulse deficits. Abdomen/GI:  Soft,      

      non-tender, with normal bowel sounds.  No distension or tympany.  No guarding or            

      rebound.  No evidence of tenderness throughout. Back:  No spinal tenderness.  No            

      costovertebral tenderness.  Full range of motion. Skin:  Warm, dry with normal turgor.      

      Normal color with no rashes, no lesions, and no evidence of cellulitis. MS/ Extremity:      

      Pulses equal, no cyanosis.  Neurovascular intact.  Full, normal range of motion. Neuro:     

       Awake and alert, GCS 15, oriented to person, place, time, and situation.  Cranial          

      nerves II-XII grossly intact.  Motor strength 5/5 in all extremities.  Sensory grossly      

      intact.  Cerebellar exam normal.  Normal gait. Psych:  Awake, alert, with orientation       

      to person, place and time.  Behavior, mood, and affect are within normal limits.            

10:30 Respiratory: No respiratory distress or accessory muscle use patient has mild diffuse       

      wheezes scattered inspiratory only.  Patient is not tachypneic at all..                     

10:31 ECG was reviewed by the Attending Physician. Normal sinus rhythm at 84 bpm with rate    sp3 

      variation due to sinus arrhythmia, normal intervals, normal QRS, mild leftward axis,        

      nonspecific diffuse ST/T changes without evidence of ischemia.                              

                                                                                                  

Vital Signs:                                                                                      

09:57  / 90; Pulse 68; Resp 22; Temp 99.6; Pulse Ox 94% ; Weight 108.86 kg; Height 6    jl7 

      ft. 1 in. (185.42 cm); Pain 4/10;                                                           

10:09 Pulse Ox 95% on R/A;                                                                    tw2 

10:53  / 82; Pulse 93; Resp 20; Pulse Ox 95% on R/A;                                    tw2 

11:31  / 85; Pulse 86; Resp 17; Pulse Ox 95% on R/A;                                    tw2 

09:57 Body Mass Index 31.66 (108.86 kg, 185.42 cm)                                            jl7 

                                                                                                  

MDM:                                                                                              

10:05 Patient medically screened.                                                             sp3 

10:30 Data reviewed: vital signs, nurses notes. ED course: Will review EKG and chest x-ray    sp3 

      and give prednisone here in the ED 10 mg. Will discharge patient home on 30 days supply     

      of 10 mg prednisone until he can be seen by his new rheumatologist which he has             

      identified in [place]. Patient was also given another local new PCP referral who is        

      seeing him for patients. Patient has no further questions and is acceptable with this       

      plan..                                                                                      

11:27 ED course: Patient on prednisone and Z-Can given chest x-ray findings..                 sp3 

                                                                                                  

                                                                                             

10:16 Order name: Chest Single View XRAY; Complete Time: 11:27                                sp3 

                                                                                             

10:16 Order name: EKG - Nurse/Tech; Complete Time: 10:20                                      sp3 

                                                                                                  

Administered Medications:                                                                         

10:22 Drug: predniSONE 10 mg Route: PO;                                                       tw2 

10:54 Follow up: Response: No adverse reaction                                                tw2 

                                                                                                  

                                                                                                  

Disposition Summary:                                                                              

21 11:29                                                                                    

Discharge Ordered                                                                                 

      Location: Home                                                                          sp3 

      Condition: Stable                                                                       sp3 

      Diagnosis                                                                                   

        - Acute bronchitis, unspecified                                                       sp3 

      Followup:                                                                               sp3 

        - With: Private Physician                                                                  

        - When: Upon discharge from the Emergency Department                                       

        - Reason: Recheck today's complaints                                                       

      Discharge Instructions:                                                                     

        - Discharge Summary Sheet                                                             sp3 

        - Acute Bronchitis, Adult                                                             sp3 

      Forms:                                                                                      

        - Medication Reconciliation Form                                                      sp3 

        - Thank You Letter                                                                    sp3 

        - Antibiotic Education                                                                sp3 

        - Prescription Opioid Use                                                             sp3 

      Prescriptions:                                                                              

        - prednisone 10 mg Oral tablet                                                             

            - take 1 tablet by ORAL route once daily; 30 tablet; Refills: 0, Product          sp3 

      Selection Permitted                                                                         

        - Zithromax Z-Can 250 mg Oral Tablet                                                       

            - take 1 tablet by ORAL route as directed for 5 days Day 1 - take two (2) tablets sp3 

      one time.  Day 2, 3, 4 , 5 take one (1) tablet once daily.; 6 tablet; Refills: 0,           

      Product Selection Permitted                                                                 

Signatures:                                                                                       

Dispatcher [employer]                           Lluvia Lindsay RN                          RN   tw2                                                  

Steph Raines RN                        RN   jl7                                                  

Shonda Law MD MD   sp3                                                  

                                                                                                  

**************************************************************************************************

## 2022-03-19 ENCOUNTER — HOSPITAL ENCOUNTER (EMERGENCY)
Dept: HOSPITAL 97 - ER | Age: 57
Discharge: HOME | End: 2022-03-19
Payer: SELF-PAY

## 2022-03-19 VITALS — TEMPERATURE: 97.9 F

## 2022-03-19 VITALS — DIASTOLIC BLOOD PRESSURE: 80 MMHG | SYSTOLIC BLOOD PRESSURE: 148 MMHG | OXYGEN SATURATION: 98 %

## 2022-03-19 DIAGNOSIS — Z76.0: ICD-10-CM

## 2022-03-19 DIAGNOSIS — L08.9: Primary | ICD-10-CM

## 2022-03-19 DIAGNOSIS — Z87.442: ICD-10-CM

## 2022-03-19 DIAGNOSIS — F17.210: ICD-10-CM

## 2022-03-19 DIAGNOSIS — J44.9: ICD-10-CM

## 2022-03-19 PROCEDURE — 99283 EMERGENCY DEPT VISIT LOW MDM: CPT

## 2022-03-19 PROCEDURE — 90471 IMMUNIZATION ADMIN: CPT

## 2022-03-19 PROCEDURE — 90714 TD VACC NO PRESV 7 YRS+ IM: CPT

## 2022-03-19 NOTE — EDPHYS
Physician Documentation                                                                           

 [facility]                                                                 

Name: Mack Ramírez                                                                                    

Age: 56 yrs                                                                                       

Sex: Male                                                                                         

: 1965                                                                                   

MRN: [id number]                                                                                   

Arrival Date: 2022                                                                          

Time: 09:28                                                                                       

Account#: [id number]                                                                            

Bed 17                                                                                            

Private MD:                                                                                       

ED Physician Bethany Fernando                                                                    

HPI:                                                                                              

                                                                                             

09:50 This 56 yrs old Male presents to ER via Ambulatory with complaints of Laceration To     kb  

      Leg, Medication Refill.                                                                     

09:50 The patient has a laceration occurred at home, and there are no complicating factors.   kb  

      The injury was accidental. The laceration(s) is(are) located on the right shin. Onset:      

      The symptoms/episode began/occurred 1 week(s) ago. Associated signs and symptoms: The       

      patient has no apparent associated signs or symptoms. The patient has not experienced       

      similar symptoms in the past. The patient has not recently seen a physician. Pt states      

      his cat had kittens and they were in a box near the bed. States he stepped down out of      

      bed and either the cat scratched him or he cut his leg on the metal part of the             

      bedframe. States this happened a week ago. Reports the skin was hanging on, but dying       

      so he cut it off. Has not been cleaning the area because he was afraid of getting it        

      wet. States he has been using hydrogen peroxide and antibiotic ointment, but the area       

      surrounding the injury is getting red. Also states he has been on prednisone for years      

      for lupus and has been out for 4 days. States he needs a refill until he gets to the        

      rheumatologist next week..                                                                  

                                                                                                  

Historical:                                                                                       

- Allergies:                                                                                      

09:49 No Known Allergies;                                                                     vg1 

- Home Meds:                                                                                      

09:49 prednisone 10 mg Oral tab once daily [Active];                                          vg1 

- PMHx:                                                                                           

09:49 COPD; Kidney stones; low back pain; Lupus;                                              vg1 

                                                                                                  

- Immunization history:: Client reports having NOT received the Covid vaccine.                    

- Social history:: Smoking status: Patient reports the use of cigarette tobacco                   

  products, denies chronic smoking, but will smoke occasionally.                                  

                                                                                                  

                                                                                                  

ROS:                                                                                              

09:47 Constitutional: Negative for fever, chills, and weight loss.                            kb  

09:47 Skin: Positive for erythema, laceration(s), of the right shin.                              

09:47 All other systems are negative.                                                             

                                                                                                  

Exam:                                                                                             

09:47 Constitutional:  This is a well developed, well nourished patient who is awake, alert,  kb  

      and in no acute distress. Head/Face:  Normocephalic, atraumatic. ENT:  Moist Mucous         

      membranes Respiratory:  Respirations even and unlabored. No increased work of               

      breathing. Talking in full sentences MS/ Extremity:  Pulses equal, no cyanosis.             

      Neurovascular intact.  Full, normal range of motion. Neuro:  Awake and alert, GCS 15,       

      oriented to person, place, time, and situation. Moves all extremities. Normal gait.         

      Psych:  Awake, alert, with orientation to person, place and time.  Behavior, mood, and      

      affect are within normal limits.                                                            

09:47 Skin: injury, avulsion(s), A moderate sized of the right shin, with surrounding             

      erythema and warmth.                                                                        

                                                                                                  

Vital Signs:                                                                                      

09:46  / 86; Pulse 80; Resp 20; Temp 97.9; Pulse Ox 96% ; Weight 104.33 kg; Height 6    vg1 

      ft. 2 in. (187.96 cm); Pain 5/10;                                                           

10:20  / 80; Pulse 18; Resp 16; Pulse Ox 98% ; Pain 2/10;                               cb5 

09:46 Body Mass Index 29.53 (104.33 kg, 187.96 cm)                                            vg1 

                                                                                                  

MDM:                                                                                              

09:38 Patient medically screened.                                                             kb  

09:47 Data reviewed: vital signs, nurses notes. Data interpreted: Pulse oximetry: on room air kb  

      is 100 %. Interpretation: normal. Counseling: I had a detailed discussion with the          

      patient and/or guardian regarding: the historical points, exam findings, and any            

      diagnostic results supporting the discharge/admit diagnosis, the need for outpatient        

      follow up, a family practitioner, to return to the emergency department if symptoms         

      worsen or persist or if there are any questions or concerns that arise at home.             

                                                                                                  

                                                                                             

09:47 Order name: Wound Care: clean and dress ; Complete Time: 10:09                          kb  

                                                                                                  

Administered Medications:                                                                         

10:08 Drug: Tetanus-Diphtheria Toxoid Adult 0.5 ml {: [employer] [facility]. Exp:            cb5 

      2023. Lot #: [id number]. } Route: IM; Site: left deltoid;                                  

10:08 Drug: Bactrim (trimethoprim-sulfamethoxazole) (160 mg-800 mg (DS) 1 tablet Route: PO;   cb5 

10:08 Drug: KeFLEX (cephalexin) 500 mg Route: PO;                                             cb5 

                                                                                                  

                                                                                                  

Disposition Summary:                                                                              

22 09:54                                                                                    

Discharge Ordered                                                                                 

      Location: Home                                                                          kb  

      Condition: Stable                                                                       kb  

      Diagnosis                                                                                   

        - Local infection of the skin and subcutaneous tissue, unspecified                    kb  

        - Encounter for issue of repeat prescription                                          kb  

      Followup:                                                                               kb  

        - With: Emergency Department                                                               

        - When: As needed                                                                          

        - Reason: Worsening of condition                                                           

      Followup:                                                                               kb  

        - With: Private Physician                                                                  

        - When: 2 - 3 days                                                                         

        - Reason: Recheck today's complaints, Continuance of care, Re-evaluation by your           

      physician                                                                                   

      Discharge Instructions:                                                                     

        - Discharge Summary Sheet                                                             kb  

        - Medicine Refill at the Emergency Department                                         kb  

        - Wound Infection, Easy-to-Read                                                       kb  

      Forms:                                                                                      

        - Medication Reconciliation Form                                                      kb  

        - Thank You Letter                                                                    kb  

        - Antibiotic Education                                                                kb  

        - Prescription Opioid Use                                                             kb  

      Prescriptions:                                                                              

        - prednisone 10 mg Oral tablet                                                             

            - take 1 tablet by ORAL route once daily; 20 tablet; Refills: 0, Product          kb  

      Selection Permitted                                                                         

        - Cephalexin 500 mg Oral Capsule                                                           

            - take 1 capsule by ORAL route every 8 hours for 10 days; 30 capsule; Refills: 0, kb  

      Product Selection Permitted                                                                 

        - Bactrim -160 mg Oral Tablet                                                        

            - take 1 tablet by ORAL route every 12 hours for 10 days; 20 tablet; Refills: 0,  kb  

      Product Selection Permitted                                                                 

Signatures:                                                                                       

Dolores Muse, KARELC                 ILEANA-Vivian Otoole, RN                    RN   vg1                                                  

Ernestina De Leon, RN                      RN   cb5                                                  

                                                                                                  

**************************************************************************************************

## 2022-03-19 NOTE — ER
Nurse's Notes                                                                                     

 [facility]                                                                 

Name: Mack Ramírez                                                                                    

Age: 56 yrs                                                                                       

Sex: Male                                                                                         

: 1965                                                                                   

MRN: [id number]                                                                                   

Arrival Date: 2022                                                                          

Time: 09:28                                                                                       

Account#: [id number]                                                                            

Bed 17                                                                                            

Private MD:                                                                                       

Diagnosis: Local infection of the skin and subcutaneous tissue, unspecified;Encounter for issue of

  repeat prescription                                                                             

                                                                                                  

Presentation:                                                                                     

                                                                                             

09:46 Chief complaint: Patient states: pt cut Right lower leg on the bed frame about a week   vg1 

      ago; states wound has become worse. Pt Right lower leg appears to be red and hot to         

      touch. Coronavirus screen: Vaccine status: Patient reports being unvaccinated. Client       

      denies travel out of the [place] in the last 14 days. Ebola Screen: Patient negative for       

      fever greater than or equal to 101.5 degrees Fahrenheit, and additional compatible          

      Ebola Virus Disease symptoms. Complicating Factors: There are no complicating factors       

      for this patient. Initial Sepsis Screen: Does the patient meet any 2 criteria? No.          

      Patient's initial sepsis screen is negative. Does the patient have a suspected source       

      of infection? No. Patient's initial sepsis screen is negative. Risk Assessment: Do you      

      want to hurt yourself or someone else? Patient reports no desire to harm self or            

      others. Onset of symptoms was 2022.                                               

09:46 Method Of Arrival: Ambulatory                                                           vg1 

09:46 Acuity: VERN 4                                                                           vg1 

                                                                                                  

Triage Assessment:                                                                                

09:49 General: Appears in no apparent distress. uncomfortable, Behavior is calm, cooperative. vg1 

      Pain: Complains of pain in medial aspect of right calf Pain currently is 5 out of 10 on     

      a pain scale. Injury Description: Laceration sustained to medial aspect of right calf.      

                                                                                                  

Historical:                                                                                       

- Allergies:                                                                                      

09:49 No Known Allergies;                                                                     vg1 

- Home Meds:                                                                                      

09:49 prednisone 10 mg Oral tab once daily [Active];                                          vg1 

- PMHx:                                                                                           

09:49 COPD; Kidney stones; low back pain; Lupus;                                              vg1 

                                                                                                  

- Immunization history:: Client reports having NOT received the Covid vaccine.                    

- Social history:: Smoking status: Patient reports the use of cigarette tobacco                   

  products, denies chronic smoking, but will smoke occasionally.                                  

                                                                                                  

                                                                                                  

Screenin:40 Abuse screen: Denies threats or abuse. Denies injuries from another. Nutritional        cb5 

      screening: No deficits noted. Tuberculosis screening: No symptoms or risk factors           

      identified.                                                                                 

                                                                                                  

Assessment:                                                                                       

09:40 General: Appears in no apparent distress. comfortable, Behavior is calm, cooperative,   cb5 

      appropriate for age. Pain: Complains of pain in medial aspect of right calf and right       

      leg and right shin. Neuro: No deficits noted. Neuro: No deficits noted. Cardiovascular:     

      No deficits noted. Cardiovascular: No deficits noted. Respiratory: No deficits noted.       

      GI: No deficits noted. : No deficits noted. : No deficits noted. EENT: No deficits      

      noted. Derm: Wound noted medial aspect of right calf and right leg and right shin           

      Other: redness, no drainage, brown in color in middle. pt stated "I was scratched by a      

      cat and its now scabbing over". Musculoskeletal: pt ambulates with a cane.                  

                                                                                                  

Vital Signs:                                                                                      

09:46  / 86; Pulse 80; Resp 20; Temp 97.9; Pulse Ox 96% ; Weight 104.33 kg; Height 6    vg1 

      ft. 2 in. (187.96 cm); Pain 5/10;                                                           

10:20  / 80; Pulse 18; Resp 16; Pulse Ox 98% ; Pain 2/10;                               cb5 

09:46 Body Mass Index 29.53 (104.33 kg, 187.96 cm)                                            vg1 

                                                                                                  

ED Course:                                                                                        

09:28 Patient arrived in ED.                                                                  as  

09:33 [facility]                                                        kb  

09:33 Bethany Fernando MD is Attending Physician.                                           kb  

09:40 No provider procedures requiring assistance completed.                                  cb5 

09:49 Triage completed.                                                                       vg1 

09:49 Arm band placed on.                                                                     vg1 

09:57 Ernestina De Leon, RN is Primary Nurse.                                                    cb5 

10:32 Bed in low position. Call light in reach.                                               cb5 

10:32 Patient did not have IV access during this emergency room visit.                        cb5 

                                                                                                  

Administered Medications:                                                                         

10:08 Drug: Tetanus-Diphtheria Toxoid Adult 0.5 ml {: [employer] [facility]. Exp:            cb5 

      2023. Lot #: [id number]. } Route: IM; Site: left deltoid;                                  

10:08 Drug: Bactrim (trimethoprim-sulfamethoxazole) (160 mg-800 mg (DS) 1 tablet Route: PO;   cb5 

10:08 Drug: KeFLEX (cephalexin) 500 mg Route: PO;                                             cb5 

                                                                                                  

                                                                                                  

Outcome:                                                                                          

09:54 Discharge ordered by MD.                                                                nancy  

10:32 Discharged to home ambulatory.                                                          cb5 

10:32 Condition: stable                                                                           

10:32 Discharge instructions given to patient.                                                    

10:32 Patient left the ED.                                                                    cb5 

                                                                                                  

Signatures:                                                                                       

Dolores Muse, THAIP-C                 THAIP-Marisa De La Cruz Victoria RN                    RN   vg1                                                  

Ernestina De Leon, RN                      RN   cb5                                                  

                                                                                                  

Corrections: (The following items were deleted from the chart)                                    

09:53 09:46  / 86; Pulse 80bpm; Resp 20bpm; Pulse Ox 96%; Temp 97.9F; Pain 5/10; vg1    vg1 

                                                                                                  

**************************************************************************************************

## 2022-03-19 NOTE — XMS REPORT
Continuity of Care Document

                            Created on:2022



Patient:REMY ROACH CHETAN

Sex:Male

:1965

External Reference #:[id number]





Demographics







                          Address                   [address]



                                                    [place] [address]

 

                          Home Phone                [phone]

 

                          Mobile Phone              [phone]

 

                          Email Address             [email or web address]

 

                          Preferred Language        English

 

                          Marital Status            Unknown

 

                          Buddhist Affiliation     Unknown

 

                          Race                      Unknown

 

                          Additional Race(s)        Unavailable

 

                          Ethnic Group              Unknown









Author







                          Organization              [facility]

t

 

                          Address                   [address]



                                                    [place] [address]

 

                          Phone                     [phone]









Support







                Name            Relationship    Address         Phone

 

                DOC            X               [phone]

[phone]



                                                [place] [address] 









Care Team Providers







                    Name                Role                Phone

 

                    PCP,  DOES NOT HAVE A Primary Care Physician Unavailable

 

                    SINGER              Attending Clinician Unavailable

 

                    Singer DO           Attending Clinician [phone]

 

                    Doctor Unassigned,  Name Attending Clinician Unavailable

 

                    Camden NP,  G       Attending Clinician [phone]

 

                    Daniel RN           Attending Clinician Unavailable

 

                    [facility]  Attending Clinician Unavailable

 

                    Sugar ANDREP           Attending Clinician [phone]

 

                    SINGER              Admitting Clinician Unavailable









Payers







           Payer Name Policy Type Policy Number Effective Date Expiration Date S

ource







Problems







       Condition Condition Condition Status Onset  Resolution Last   Treating Co

mments 

Source



       Name   Details Category        Date   Date   Treatment Clinician        



                                                 Date                 

 

       No known No known Disease                                           Unive

rs



       active active                                                  ity of



       problems problems                                                  [facility] [place]







Allergies, Adverse Reactions, Alerts







       Allergy Allergy Status Severity Reaction(s) Onset  Inactive Treating Comm

ents 

Source



       Name   Type                        Date   Date   Clinician        

 

       NO KNOWN Drug   Active                                           [facility]



       ALLERGIE Class                                                   [facility] of



       S                                                              [facility] [place]







Social History







           Social Habit Start Date Stop Date  Quantity   Comments   Source

 

           Exposure to                       Not sure              [facility] 



           SARS-CoV-2                                             [facility]



           (event)                                                Branch

 

           Alcohol intake 2021 Current               University

 



                      00:00:00   00:00:00   non-drinker of            [place] [facility]

sharona



                                            alcohol               Branch



                                            (finding)             

 

           Tobacco use and 2018 Never used            [facility]

y of



           exposure   00:00:00   00:00:00                         [facility] [place]

 

           Sex Assigned At 1965                       [facility]

y of



           Birth      00:00:00   00:00:00                         [facility] [place]









                Smoking Status  Start Date      Stop Date       Source

 

                Current every day smoker 2018 00:00:00                 [facility]

[facility] [place] [facility]



                                                                Branch







Medications







       Ordered Filled Start  Stop   Current Ordering Indication Dosage Frequency

 Signature

                    Comments            Components          Source



     Medication Medication Date Date Medication? Clinician                (SIG) 

          



     Name Name                                                   

 

     naproxen            Yes       [id number] 550mg      Take 1           

[facility]



     sodium      -                               tablet by           ity of



     (ANAPROX      00:00:                               mouth 2           [place]



     DS) 550 mg      00                                 (two)           Medical



     tablet                                         times           Branch



                                                  daily with           



                                                  meals.           

 

     predniSONE            Yes       [id number]           Take bid          

 [facility]



     10 mg      2-02                               for 1 week           ity of



     tablet      00:00:                               then QD           [place]



               00                                 thereafter           Medical



                                                  for lupus           Branch

 

     predniSONE            Yes       [id number]           Take bid          

 [facility]



     10 mg      2-02                               for 1 week           ity of



     tablet      00:00:                               then QD           [place]



               00                                 thereafter           Medical



                                                  for lupus           Branch

 

     predniSONE            Yes       [id number]           Take bid          

 [facility]



     10 mg      -                               for 1 week           ity of



     tablet      00:00:                               then QD           [place]



               00                                 thereafter           Medical



                                                  for lupus           Branch

 

     predniSONE      -2022- No        [id number]           Take bid         

  [facility]



     10 mg      2--                          for 1 week           ity of



     tablet      00:00: 00:00                          then QD           [place]



               00   :00                           thereafter           Medical



                                                  for lupus           Branch

 

     predniSONE      -2021- No        [id number]           Take 1          

 Univers



     5 mg tablet                                tablet by           it

y of



               00:00: 04:59                          mouth           [place]



               00   :00                           daily for           Medical



                                                  7 days,           Branch



                                                  THEN 0.5           



                                                  tablets           



                                                  daily for           



                                                  7 days.           

 

     predniSONE      -2021- No        [id number]           Take 1          

 Univers



     5 mg tablet                                tablet by           it

y of



               00:00: 04:59                          mouth           [place]



               00   :00                           daily for           Medical



                                                  7 days,           Branch



                                                  THEN 0.5           



                                                  tablets           



                                                  daily for           



                                                  7 days.           

 

     predniSONE      -2021- No             20mg      Take 20 mg           

Univers



     (DELTASONE)      8-25 08-25                          by mouth           ity

 of



     20 mg      21:01: 00:00                          daily.           [place]



     tablet      23   :00                                          Medical



                                                                 Branch

 

     predniSONE      -2021- No             20mg      Take 20 mg           

Univers



     (DELTASONE)      8-25 08-25                          by mouth           ity

 of



     20 mg      21:01: 00:00                          daily.           [place]



     tablet      23   :00                                          Medical



                                                                 Branch

 

     predniSONE      -2021- No        [id number]           Take 0.5        

   [facility]



     20 mg      8-25 09-09                          tablets by           ity of



     tablet      00:00: 04:59                          mouth 2           [place]



               00   :00                           (two)           Medical



                                                  times           [place]



                                                  daily for           



                                                  7 days,           



                                                  THEN 0.5           



                                                  tablets           



                                                  daily for           



                                                  7 days.           

 

     predniSONE      2021- No        [id number]           Take 0.5        

   [facility]



     20 mg      8-25 -09                          tablets by           ity of



     tablet      00:00: 04:59                          mouth 2           [place]



               00   :00                           (two)           Medical



                                                  times           [place]



                                                  daily for           



                                                  7 days,           



                                                  THEN 0.5           



                                                  tablets           



                                                  daily for           



                                                  7 days.           

 

     methylPREDN      2018-0      Yes            84mg      Take 21           Uni

vers



     ISolone      5-07                               tablets by           ity of



     (MEDROL,      00:00:                               mouth           [place]



     TEGAN,) 4 mg      00                                 SEE-INSTRU           Med

ical



     tablets                                         CTIONS.           Branch



                                                  follow           



                                                  package           



                                                  directions           

 

     methylPREDN      2018-0      Yes            84mg      Take 21           Uni

vers



     ISolone      5-07                               tablets by           ity of



     (MEDROL,      00:00:                               mouth           [place]



     TEGAN,) 4 mg      00                                 SEE-INSTRU           Med

ical



     tablets                                         CTIONS.           Branch



                                                  follow           



                                                  package           



                                                  directions           

 

     methylPREDN      2018-0      Yes            84mg      Take 21           Uni

vers



     ISolone      5-07                               tablets by           ity of



     (MEDROL,      00:00:                               mouth           [place]



     TEGAN,) 4 mg      00                                 SEE-INSTRU           Med

ical



     tablets                                         CTIONS.           Branch



                                                  follow           



                                                  package           



                                                  directions           

 

     methylPREDN      2018-0      Yes            84mg      Take 21           Uni

vers



     ISolone      5-07                               tablets by           ity of



     (MEDROL,      00:00:                               mouth           [place]



     TEGAN,) 4 mg      00                                 SEE-INSTRU           Med

ical



     tablets                                         CTIONS.           Branch



                                                  follow           



                                                  package           



                                                  directions           

 

     methylPREDN      -0 - No             84mg      Take 21           Un

radha



     ISolone      5-07 08-25                          tablets by           ity o

f



     (MEDROL,      00:00: 00:00                          mouth           [place]



     TEGAN,) 4 mg      00   :00                           SEE-INSTRU           Med

ical



     tablets                                         CTIONS.           Branch



                                                  follow           



                                                  package           



                                                  directions           

 

     methylPREDN      -0 - No             84mg      Take 21           Un

radha



     ISolone      5-07 08-25                          tablets by           ity o

f



     (MEDROL,      00:00: 00:00                          mouth           [place]



     TEGAN,) 4 mg      00   :00                           SEE-INSTRU           Med

ical



     tablets                                         CTIONS.           Branch



                                                  follow           



                                                  package           



                                                  directions           

 

     predniSONE            Yes            20mg      Take 20 mg           U

nivers



     (DELTASONE)      4-12                               by mouth           ity 

of



     20 mg      14:00:                               daily.           [place]



     tablet      56                                                Medical



                                                                 Branch

 

     predniSONE      2018-0      Yes            20mg      Take 20 mg           U

nivers



     (DELTASONE)      4-12                               by mouth           ity 

of



     20 mg      14:00:                               daily.           [place]



     tablet      56                                                Medical



                                                                 Branch

 

     predniSONE      2018-0      Yes            20mg      Take 20 mg           U

nivers



     (DELTASONE)      4-12                               by mouth           ity 

of



     20 mg      14:00:                               daily.           [place]



     tablet      56                                                Medical



                                                                 Branch

 

     predniSONE      2018-0      Yes            20mg      Take 20 mg           U

nivers



     (DELTASONE)      4-12                               by mouth           ity 

of



     20 mg      14:00:                               daily.           [place]



     tablet      56                                                Medical



                                                                 Branch

 

     traMADOL 50      2018-0      Yes            50mg      Take 1           Univ

ers



     mg tablet      2-19                               tablet by           ity o

f



               00:00:                               mouth           [place]



               00                                 every 4           Medical



                                                  (four)           Branch



                                                  hours as           



                                                  needed for           



                                                  Pain           



                                                  (scale           



                                                  4-6) or           



                                                  Pain           



                                                  unrelieved           



                                                  by             



                                                  non-narcot           



                                                  ic             



                                                  analgesics           



                                                  .              

 

     traMADOL 50      2018-0      Yes            50mg      Take 1           Univ

ers



     mg tablet      2-19                               tablet by           ity o

f



               00:00:                               mouth           [place]



               00                                 every 4           Medical



                                                  (four)           Branch



                                                  hours as           



                                                  needed for           



                                                  Pain           



                                                  (scale           



                                                  4-6) or           



                                                  Pain           



                                                  unrelieved           



                                                  by             



                                                  non-narcot           



                                                  ic             



                                                  analgesics           



                                                  .              

 

     traMADOL 50      2018-0      Yes            50mg      Take 1           Univ

ers



     mg tablet      2-19                               tablet by           ity o

f



               00:00:                               mouth           [place]



               00                                 every 4           Medical



                                                  (four)           Branch



                                                  hours as           



                                                  needed for           



                                                  Pain           



                                                  (scale           



                                                  4-6) or           



                                                  Pain           



                                                  unrelieved           



                                                  by             



                                                  non-narcot           



                                                  ic             



                                                  analgesics           



                                                  .              

 

     traMADOL 50      2018-0      Yes            50mg      Take 1           Univ

ers



     mg tablet      2-19                               tablet by           ity o

f



               00:00:                               mouth           [place]



               00                                 every 4           Medical



                                                  (four)           Branch



                                                  hours as           



                                                  needed for           



                                                  Pain           



                                                  (scale           



                                                  4-6) or           



                                                  Pain           



                                                  unrelieved           



                                                  by             



                                                  non-narcot           



                                                  ic             



                                                  analgesics           



                                                  .              

 

     traMADOL 50      -0 - No             50mg      Take 1           Uni

vers



     mg tablet      2-19 08-25                          tablet by           ity 

of



               00:00: 00:00                          mouth           [place]



               00   :00                           every 4           Medical



                                                  (four)           Branch



                                                  hours as           



                                                  needed for           



                                                  Pain           



                                                  (scale           



                                                  4-6) or           



                                                  Pain           



                                                  unrelieved           



                                                  by             



                                                  non-narcot           



                                                  ic             



                                                  analgesics           



                                                  .              

 

     traMADOL 50      -0 - No             50mg      Take 1           Uni

vers



     mg tablet      2-19 08-25                          tablet by           ity 

of



               00:00: 00:00                          mouth           [place]



               00   :00                           every 4           Medical



                                                  (four)           Branch



                                                  hours as           



                                                  needed for           



                                                  Pain           



                                                  (scale           



                                                  4-6) or           



                                                  Pain           



                                                  unrelieved           



                                                  by             



                                                  non-narcot           



                                                  ic             



                                                  analgesics           



                                                  .              

 

     acetaminoph            Yes                      1-2 by           [facility]

ers



     en-codeine      2-02                               mouth           ity of



     (TYLENOL-CO      00:00:                               every 4-6           [place]

[place]



     DEINE #3)      00                                 hours as           Medica

l



     300-30 mg                                         needed prn           Bran

ch



     tablet                                         pain           

 

     acetaminoph            Yes                      1-2 by           [facility]

ers



     en-codeine      2-02                               mouth           ity of



     (TYLENOL-CO      00:00:                               every 4-6           [place]

[place]



     DEINE #3)      00                                 hours as           Medica

l



     300-30 mg                                         needed prn           Bran

ch



     tablet                                         pain           

 

     acetaminoph            Yes                      1-2 by           [facility]



     en-codeine      2-02                               mouth           ity of



     (TYLENOL-CO      00:00:                               every 4-6           [place]

[place]



     DEINE #3)      00                                 hours as           Medica

l



     300-30 mg                                         needed prn           Bran

ch



     tablet                                         pain           

 

     acetaminoph            Yes                      1-2 by           [facility]

ers



     en-codeine      2-02                               mouth           ity of



     (TYLENOL-CO      00:00:                               every 4-6           [place]

[place]



     DEINE #3)      00                                 hours as           Medica

l



     300-30 mg                                         needed prn           Bran

ch



     tablet                                         pain           

 

     acetaminoph       202- No                       1-2 by           [facility]

vers



     en-codeine      2-02 08-25                          mouth           ity of



     (TYLENOL-CO      00:00: 00:00                          every 4-6           

[place]



     DEINE #3)      00   :00                           hours as           Medica

l



     300-30 mg                                         needed prn           Bran

ch



     tablet                                         pain           

 

     acetaminoph       202- No                       1-2 by           [facility]

vers



     en-codeine      2-02 08-25                          mouth           ity of



     (TYLENOL-CO      00:00: 00:00                          every 4-6           

[place]



     DEINE #3)      00   :00                           hours as           Medica

l



     300-30 mg                                         needed prn           Bran

ch



     tablet                                         pain           

 

     docusate            Yes            100mg      Take 1           Univer

s



     (COLACE)      4-20                               capsule by           ity o

f



     100 mg      00:00:                               mouth 3           [place]



     capsule      00                                 (three)           Medical



                                                  times           Branch



                                                  daily.           

 

     Polyethylen            Yes            1{packe      Take 1           U

nivers



     e Glycol      4-20                     t}        Packet by           ity of



     3350      00:00:                               mouth 2           [place]



     (MIRALAX)      00                                 (two)           Medical



     17 gram                                         times           Branch



     powder                                         daily.           

 

     docusate            Yes            100mg      Take 1           Univer

s



     (COLACE)      4-20                               capsule by           ity o

f



     100 mg      00:00:                               mouth 3           [place]



     capsule      00                                 (three)           Medical



                                                  times           Branch



                                                  daily.           

 

     Polyethylen            Yes            1{packe      Take 1           U

nivers



     e Glycol      4-20                     t}        Packet by           ity of



     3350      00:00:                               mouth 2           [place]



     (MIRALAX)      00                                 (two)           Medical



     17 gram                                         times           Branch



     powder                                         daily.           

 

     docusate            Yes            100mg      Take 1           Univer

s



     (COLACE)      4-20                               capsule by           ity o

f



     100 mg      00:00:                               mouth 3           [place]



     capsule      00                                 (three)           Medical



                                                  times           Branch



                                                  daily.           

 

     docusate            Yes            100mg      Take 1           Univer

s



     (COLACE)      4-20                               capsule by           ity o

f



     100 mg      00:00:                               mouth 3           [place]



     capsule      00                                 (three)           Medical



                                                  times           Branch



                                                  daily.           

 

     Polyethylen            Yes            1{packe      Take 1           U

nivers



     e Glycol      4-20                     t}        Packet by           ity of



     3350      00:00:                               mouth 2           [place]



     (MIRALAX)      00                                 (two)           Medical



     17 gram                                         times           Branch



     powder                                         daily.           

 

     Polyethylen            Yes            1{packe      Take 1           U

nivers



     e Glycol      4-20                     t}        Packet by           ity of



     3350      00:00:                               mouth 2           [place]



     (MIRALAX)      00                                 (two)           Medical



     17 gram                                         times           Branch



     powder                                         daily.           

 

     docusate      2021- No             100mg      Take 1           Unive

rs



     (COLACE)      4-20 08-25                          capsule by           ity 

of



     100 mg      00:00: 00:00                          mouth 3           [place]



     capsule      00   :00                           (three)           Medical



                                                  times           Branch



                                                  daily.           

 

     Polyethylen      2021- No             1{packe      Take 1           

Univers



     e Glycol      4-20 08-25                t}        Packet by           ity o

f



     3350      00:00: 00:00                          mouth 2           [place]



     (MIRALAX)      00   :00                           (two)           Medical



     17 gram                                         times           Branch



     powder                                         daily.           

 

     docusate      2021- No             100mg      Take 1           Unive

rs



     (COLACE)      4-20 08-25                          capsule by           ity 

of



     100 mg      00:00: 00:00                          mouth 3           [place]



     capsule      00   :00                           (three)           Medical



                                                  times           Branch



                                                  daily.           

 

     Polyethylen      2021- No             1{packe      Take 1           

Univers



     e Glycol      4-20 08-25                t}        Packet by           ity o

f



     3350      00:00: 00:00                          mouth 2           [place]



     (MIRALAX)      00   :00                           (two)           Medical



     17 gram                                         times           Branch



     powder                                         daily.           







Vital Signs







             Vital Name   Observation Time Observation Value Comments     Source

 

             Systolic blood 2022 18:52:58 140 mm[Hg]                Univer

sity of



             pressure                                            [place] [facility]



                                                                 Branch

 

             Diastolic blood 2022 18:52:58 82 mm[Hg]                 Unive

rsity of



             pressure                                            [place] [facility]



                                                                 Branch

 

             Heart rate   2022 18:52:58 88 /min                   Universi

ty of



                                                                 [place] [facility]



                                                                 Branch

 

             Respiratory rate 2022 18:52:58 18 /min                   Univ

ersity of



                                                                 [place] [facility]



                                                                 Branch

 

             Oxygen saturation in 2022 18:52:58 99 /min                   

[facility]



             Arterial blood by                                        [place] [facility]

[facility]



             Pulse oximetry                                        Branch

 

             Body temperature 2022 16:31:00 36.44 Ameena                 Univ

ersity of



                                                                 [place] [facility]



                                                                 Branch

 

             Body weight  2022 16:30:00 90.719 kg                 Universi

ty of



                                                                 [place] [facility]



                                                                 Branch

 

             BMI          2022 16:30:00 26.39 kg/m2               Universi

ty of



                                                                 [place] [facility]



                                                                 Branch

 

             Systolic blood 2022 18:06:00 143 mm[Hg]                Univer

sity of



             pressure                                            [place] [facility]



                                                                 Branch

 

             Diastolic blood 2022 18:06:00 91 mm[Hg]                 Unive

rsity of



             pressure                                            [place] [facility]



                                                                 Branch

 

             Heart rate   2022 18:04:00 60 /min                   Universi

ty of



                                                                 [place] [facility]



                                                                 Branch

 

             Body temperature 2022 18:04:00 37.06 Ameena                 Univ

ersity of



                                                                 [place] [facility]



                                                                 Branch

 

             Respiratory rate 2022 18:04:00 18 /min                   Univ

ersity of



                                                                 [place] [facility]



                                                                 Branch

 

             Body weight  2022 18:04:00 90.719 kg                 Universi

ty of



                                                                 [place] [facility]



                                                                 Branch

 

             BMI          2022 18:04:00 26.39 kg/m2               Universi

ty of



                                                                 [place] [facility]



                                                                 Branch

 

             Oxygen saturation in 2022 18:04:00 96 /min                   

[facility]



             Arterial blood by                                        [place] [facility]

sharona



             Pulse oximetry                                        Branch

 

             Systolic blood 2021 20:25:00 149 mm[Hg]                Univer

sity of



             pressure                                            [place] [facility]



                                                                 Branch

 

             Diastolic blood 2021 20:25:00 110 mm[Hg]                Unive

rsity of



             pressure                                            [place] [facility]



                                                                 Branch

 

             Heart rate   2021 20:25:00 75 /min                   Universi

ty of



                                                                 [facility] [place]

 

             Body temperature 2021 20:25:00 36.33 Ameena                 Univ

ersity of



                                                                 [facility] [place]

 

             Respiratory rate 2021 20:25:00 18 /min                   Univ

ersity of



                                                                 [facility] [place]

 

             Body weight  2021 20:25:00 90.719 kg                 Universi

ty of



                                                                 [place] [facility]



                                                                 [place]

 

             BMI          2021 20:25:00 26.39 kg/m2               Universi

ty of



                                                                 [place] [facility]



                                                                 [place]

 

             Oxygen saturation in 2021 20:25:00 95 /min                   

[facility]



             Arterial blood by                                        [place] [facility]

sh[facility]ona



             Pulse oximetry                                        Branch

 

             Systolic blood 2020 15:29:00 156 mm[Hg]                Univer

sity of



             [facility]

 

             Diastolic blood 2020 15:29:00 97 mm[Hg]                 Unive

rsity of



             [facility]

 

             Heart rate   2020 15:29:00 66 /min                   Universi

ty of



                                                                 [place] [facility]



                                                                 [place]

 

             Body temperature 2020 15:22:00 36.67 Ameena                 Univ

ersity of



                                                                 [place] [facility]



                                                                 [place]

 

             Respiratory rate 2020 15:22:00 18 /min                   [facility]



                                                                 [facility] [place]

 

             Body height  2020 15:22:00 185.4 cm                  Universi

ty of



                                                                 [place] [facility]



                                                                 [place]

 

             Body weight  2020 15:22:00 90.719 kg                 Universi

ty of



                                                                 [place] [facility]



                                                                 [place]

 

             BMI          2020 15:22:00 26.39 kg/m2               Universi

ty of



                                                                 [place] [facility]



                                                                 [place]

 

             Oxygen saturation in 2020 15:22:00 95 /min                   

University of



             Arterial blood by                                        [place] [facility]

[facility]



             Pulse oximetry                                        Branch







Procedures







                Procedure       Date / Time Performed Performing Clinician Sourchristal

e

 

                XR KNEE 3 VW LEFT 2022 16:49:35 Celina Lan [facility]

 

                CONSENT/REFUSAL FOR 2022 16:22:18 Doctor Unassigned, No Un

[facility]



                DIAGNOSIS AND                   Name            Medical Branch



                TREATMENT                                       

 

                COMP. METABOLIC PANEL 2022 19:06:00 Celina Lan

[facility]



                ([id number])                                         [facility]

 

                CBC WITH DIFF   2022 19:06:00 Celina Lan o

f [facility] [place]

 

                NOTICE OF PRIVACY 2022 17:29:34 Doctor Unassigned, No Univ

[facility] [place]



                PRACTICES                       Name            Medical Branch

 

                CONSENT/REFUSAL FOR 2022 17:29:14 Doctor Unassigned, No Un

[facility] of 

[place]



                DIAGNOSIS AND                   Name            Medical Branch



                TREATMENT                                       

 

                CONSENT/REFUSAL FOR 2021 20:11:27 Doctor Unassigned, No Un

iversity of 

[place]



                DIAGNOSIS AND                   Name            Medical Branch



                TREATMENT                                       







Encounters







        Start   End     Encounter Admission Attending Care    Care    Encounter 

Source



        Date/Time Date/Time Type    Type    Clinicians Facility Department ID   

   

 

        2021         Emergency                 [facility]    [id number] 

Univers



        18:06:45                                                         ity of



                                                                        [facility] [place]

 

        2022 Emergency [facility]    ERT     [id number]

43 Univers



        10:34:00 12:59:00                 CELINA soliz [facility]

 

        2022 [facility]         [facility]    [id number]

5804 Univers



        10:34:00 12:59:00                 Celina HARPER [id number]         i

ty of



                                                [place] [id number]         [facility]  [phone]         Medi

sharona



                                                        [address]             [place]

 

        2022 Orders          Doctor CHAPMAN    [id number]

96 Univers



        00:00:00 00:00:00 Only            Unassigned, DORIS   [id number]       

  ity of



                                        [place] [facility] [id number]         [place]

as



                                                        [phone]         Medi

sharona



                                                        [address]             Branch

 

        [facility]     [id number]

58 Univers



        12:06:00 13:47:00                 CELINA soliz [facility]

 

        2022 [facility]         [facility]    [id number]

4684 Univers



        12:06:00 13:47:00                 Celina HARPER [id number]         i

ty of



                                                [place] [id number]         [facility]  [phone]         Medi

sharona



                                                        [address]             [place]

 

        2021 Emergency         [facility]    [id number]

0722 Univers



        15:27:00 16:34:00                 Annemarie Harper [id number]         

ity of



                                                [place] [id number]         [place]

[facility]  [phone]         Medi

sharona



                                                        [address]             [place]

 

        2021 Orders          Doctor  ADELA    [id number]

83 Univers



        00:00:00 00:00:00 Only            Unassigned, DORIS   [id number]       

  ity of



                                        [place] [facility] [id number]         [place]

as



                                                        [phone]         Medi

sharona



                                                        [address]             Branch

 

        2020 Telephone         ADELA Sanchez    [id number]

6715 Univers



        00:00:00 00:00:00                 Chiquis GEORGE   [id number]         it

y of



                                                HOSPITAL [id number]         [place]

as



                                                        [phone]         Medi

sharona



                                                        [address]             [place]

 

        2020 Urgent          [facility]    1.

[id number] 

Univers



        10:06:34 10:26:34 Care            [employer]  [id number]    

     ity of



                                                [place] [id number]         [place]

as



                                                [employer] [phone]         Me

dical



                                                [facility]     [address]             [place]



                                                Office                  



                                                Building                 



                                                One                     

 

        2020 Outpatient R               [facility]    [id number]

284 Univers



        10:00:00 10:00:00                                                 ity of



                                                                        [facility] [place]







Results







           Test Description Test Time  Test Comments Results    Result Comments 

Source









                    COMP. METABOLIC PANEL (18361) 2022 19:24:53 









                      Test Item  Value      Reference Range Interpretation Comme

nts









             NA (test code = 9096689599) 139 mmol/L   135-145                   

 

             K (test code = 6996727824) 4.3 mmol/L   3.5-5.0                   

 

             CL (test code = 8124741907) 102 mmol/L                       

 

             CO2 TOTAL (test code = 4930225651) 32 mmol/L    23-31        H     

       

 

             AGAP (test code = 8085493473)              2-16                    

  

 

             BUN (test code = 7555582939) 3 mg/dL      7-23         L           

 

 

             GLUCOSE (test code = 8842524935) 90 mg/dL                    

     

 

             CREATININE (test code = 0.59 mg/dL   0.60-1.25    L            



             [id number])                                         

 

             TOTAL BILI (test code = 0.6 mg/dL    0.1-1.1                   



             3386962052)                                         

 

             CALCIUM (test code = 5053052424) 8.1 mg/dL    8.6-10.6     L       

     

 

             T PROTEIN (test code = 0962668940) 7.3 g/dL     6.3-8.2            

       

 

             ALBUMIN (test code = 0441747718) 3.8 g/dL     3.5-5.0              

     

 

             ALK PHOS (test code = 2506405561) 88 U/L                     

      

 

             ALTv (test code = 1742-6) 10 U/L       5-50                      

 

             AST(SGOT) (test code = 2537863927) 26 U/L       13-40              

       

 

             eGFR (test code = 3483439368)              mL/min/1.73m2           

   

 

             LISANDRA (test code = LISANDRA) [employer]                    

       



                          Filtration Rate (GFR) and Staging                     

      



                          of Kidney Disease*                           



                          +-----------------------+--------                     

      



                          -------------+-------------------                     

      



                          ------+| GFR (mL/min/1.73 m2) ?|                      

     



                          With Kidney Damage ?| ?Without                        

   



                          Kidney                                 



                          Damage+-----------------------+--                     

      



                          -------------------+-------------                     

      



                          ------------+| ?>90 ? ? ? ? ? ? ?                     

      



                          ? ?| ?Stage one ? ? ? ? ?| ?                          

 



                          Normal ? ? ? ? ? ? ?                           



                          ?+-----------------------+-------                     

      



                          --------------+------------------                     

      



                          -------+| ?60-89 ? ? ? ? ? ? ? ?|                     

      



                          ?Stage two ? ? ? ? ?| ? Decreased                     

      



                          GFR ? ? ? ?                            



                          +-----------------------+--------                     

      



                          -------------+-------------------                     

      



                          ------+| ?30-59 ? ? ? ? ? ? ? ?|                      

     



                          ?Stage three ? ? ? ?| ? Stage                         

  



                          three ? ? ? ? ?                           



                          +-----------------------+--------                     

      



                          -------------+-------------------                     

      



                          ------+| ?15-29 ? ? ? ? ? ? ? ?|                      

     



                          ?Stage four ? ? ? ? | ? Stage                         

  



                          four ? ? ? ? ?                           



                          ?+-----------------------+-------                     

      



                          --------------+------------------                     

      



                          -------+| ?<15 (or dialysis) ? ?|                     

      



                          ?Stage five ? ? ? ? | ? Stage                         

  



                          five ? ? ? ? ?                           



                          ?+-----------------------+-------                     

      



                          --------------+------------------                     

      



                          -------+ *Each stage assumes the                      

     



                          associated GFR level has been in                      

     



                          effect for at least three months.                     

      



                          ?Stages 1 to 5, with or without                       

    



                          kidney disease, indicate chronic                      

     



                          kidney disease. Notes:                           



                          Determination of stages one and                       

    



                          two (with eGFR >59mL/min/1.73 m2)                     

      



                          requires estimation of kidney                         

  



                          damage for at least three months                      

     



                          as defined by structural or                           



                          functional abnormalities of the                       

    



                          kidney, manifested by                           



                          either:Pathological abnormalities                     

      



                          or Markers of kidney damage                           



                          (including abnormalities in the                       

    



                          composition of the blood or urine                     

      



                          or abnormalities in imaging                           



                          tests).                                

 

             Lab Interpretation (test code = Abnormal                           

    



             98641-4)                                            



[facility] WITH [id number] 19:22:10





             Test Item    Value        Reference Range Interpretation Comments

 

             WBC (test code =              See_Comment                [Automated



             6690-2)                                             message] The sy

stem



                                                                 which generated



                                                                 this result



                                                                 transmitted



                                                                 reference range

:



                                                                 4.20 - 10.70



                                                                 10*3/?L. The



                                                                 reference range

 was



                                                                 not used to



                                                                 interpret this



                                                                 result as



                                                                 normal/abnormal

.

 

             RBC (test code =              See_Comment                [Automated



             789-8)                                              message] The sy

stem



                                                                 which generated



                                                                 this result



                                                                 transmitted



                                                                 reference range

:



                                                                 4.26 - 5.52



                                                                 10*6/?L. The



                                                                 reference range

 was



                                                                 not used to



                                                                 interpret this



                                                                 result as



                                                                 normal/abnormal

.

 

             HGB (test code = 14.4 g/dL    12.2-16.4                 



             718-7)                                              

 

             HCT (test code = 45.3 %       38.4-49.3                 



             4544-3)                                             

 

             MCV (test code = 97.6 fL      81.7-95.6    H            



             787-2)                                              

 

             MCH (test code = 31.0 pg      26.1-32.7                 



             785-6)                                              

 

             MCHC (test code = 31.8 g/dL    31.2-35.0                 



             786-4)                                              

 

             RDW-SD (test code = 46.4 fL      38.5-51.6                 



             19089-2)                                            

 

             RDW-CV (test code = 12.9 %       12.1-15.4                 



             788-0)                                              

 

             PLT (test code =              See_Comment                [Automated



             777-3)                                              message] The sy

stem



                                                                 which generated



                                                                 this result



                                                                 transmitted



                                                                 reference range

:



                                                                 150 - 328 10*3/

?L.



                                                                 The reference r

gwendolyn



                                                                 was not used to



                                                                 interpret this



                                                                 result as



                                                                 normal/abnormal

.

 

             MPV (test code = 11.7 fL      9.8-13.0                  



             99463-4)                                            

 

             NRBC/100 WBC (test              See_Comment                [Automat

ed



             code = 3639409600)                                        message] 

The system



                                                                 which generated



                                                                 this result



                                                                 transmitted



                                                                 reference range

:



                                                                 0.0 - 10.0 /100



                                                                 WBCs. The refer

ence



                                                                 range was not u

sed



                                                                 to interpret th

is



                                                                 result as



                                                                 normal/abnormal

.

 

             NRBC x10^3 (test code <0.01        See_Comment                [Auto

mated



             = [id number])                                        message] The s

ystem



                                                                 which generated



                                                                 this result



                                                                 transmitted



                                                                 reference range

:



                                                                 10*3/?L. The



                                                                 reference range

 was



                                                                 not used to



                                                                 interpret this



                                                                 result as



                                                                 normal/abnormal

.

 

             GRAN MAT (NEUT) % 68.0 %                                 



             (test code = 770-8)                                        

 

             IMM GRAN % (test code 0.50 %                                 



             = [id number])                                        

 

             LYMPH % (test code = 16.9 %                                 



             736-9)                                              

 

             MONO % (test code = 10.5 %                                 



             5905-5)                                             

 

             EOS % (test code = 3.4 %                                  



             713-8)                                              

 

             BASO % (test code = 0.7 %                                  



             706-2)                                              

 

             GRAN MAT x10^3(ANC) 2.98 10*3/uL 1.99-6.95                 



             (test code =                                        



             [id number])                                         

 

             IMM GRAN x10^3 (test <0.03        0.00-0.06                 



             code = [id number])                                        

 

             LYMPH x10^3 (test code 0.74 10*3/uL 1.09-3.23    L            



             = 731-0)                                            

 

             MONO x10^3 (test code 0.46 10*3/uL 0.36-1.02                 



             = 742-7)                                            

 

             EOS x10^3 (test code = 0.15 10*3/uL 0.06-0.53                 



             711-2)                                              

 

             BASO x10^3 (test code 0.03 10*3/uL 0.01-0.09                 



             = 704-7)                                            

 

             Lab Interpretation Abnormal                               



             (test code = 55940-4)                                        



[facility]

## 2022-05-06 ENCOUNTER — HOSPITAL ENCOUNTER (EMERGENCY)
Dept: HOSPITAL 97 - ER | Age: 57
Discharge: HOME | End: 2022-05-06
Payer: SELF-PAY

## 2022-05-06 VITALS — TEMPERATURE: 98.9 F | SYSTOLIC BLOOD PRESSURE: 122 MMHG | OXYGEN SATURATION: 99 % | DIASTOLIC BLOOD PRESSURE: 101 MMHG

## 2022-05-06 DIAGNOSIS — Z76.0: Primary | ICD-10-CM

## 2022-05-06 PROCEDURE — 99281 EMR DPT VST MAYX REQ PHY/QHP: CPT

## 2022-05-06 NOTE — EDPHYS
Physician Documentation                                                                           

 [facility]                                                                 

Name: Mack Ramírez                                                                                    

Age: 56 yrs                                                                                       

Sex: Male                                                                                         

: 1965                                                                                   

MRN: [id number]                                                                                   

Arrival Date: 2022                                                                          

Time: 12:39                                                                                       

Account#: [id number]                                                                            

Bed 10                                                                                            

Private MD:                                                                                       

ED Physician Shonda Law                                                                         

HPI:                                                                                              

                                                                                             

13:10 This 56 yrs old Male presents to ER via Ambulatory with complaints of Medication Refill.cp  

13:10 The patient presents to the emergency department requesting refill(s) for: oral         cp  

      prednisone. The patient chronically suffers from Lupus. Patient reports he is currently     

      in process of finding new physician and reports nausea and headache yesterday. Ran out      

      of prednisone 4 days ago.                                                                   

                                                                                                  

Historical:                                                                                       

- Allergies:                                                                                      

12:57 No Known Allergies;                                                                     iw  

- Home Meds:                                                                                      

12:57 prednisone 20 mg oral tab once daily [Active];                                          iw  

- PMHx:                                                                                           

12:57 COPD; Kidney stones; low back pain; Lupus;                                              iw  

                                                                                                  

                                                                                                  

                                                                                                  

ROS:                                                                                              

13:12 Constitutional: Negative for body aches, chills, fever, poor PO intake.                 cp  

13:12 Eyes: Negative for injury, pain, redness, and discharge.                                cp  

13:12 Cardiovascular: Negative for chest pain, palpitations.                                      

13:12 Respiratory: Negative for cough, shortness of breath, wheezing.                             

13:12 Abdomen/GI: Negative for vomiting, diarrhea, constipation.                                  

13:12 Neuro: Negative for altered mental status, headache, weakness.                              

13:12 All other systems are negative.                                                             

                                                                                                  

Exam:                                                                                             

13:15 Constitutional: The patient appears in no acute distress, alert, awake, non-toxic, well cp  

      developed, well nourished.                                                                  

13:15 Head/Face:  Normocephalic, atraumatic.                                                  cp  

13:15 Cardiovascular: Rate: normal, Rhythm: regular.                                              

13:15 Respiratory: the patient does not display signs of respiratory distress,  Respirations:     

      normal, no use of accessory muscles, no retractions, labored breathing, is not present,     

      Breath sounds: are clear throughout, no decreased breath sounds.                            

13:15 Abdomen/GI: Exam negative for discomfort, distension, guarding, Inspection: abdomen         

      appears normal.                                                                             

13:15 Skin: rash can be described as erythematous, on the face.                                   

                                                                                                  

Vital Signs:                                                                                      

12:55  / 101; Pulse 93; Resp 16; Temp 98.9; Pulse Ox 99% on R/A;                        iw  

                                                                                                  

MDM:                                                                                              

13:09 Patient medically screened.                                                             cp  

13:16 Data reviewed: vital signs, nurses notes.                                               cp  

13:16 Counseling: I had a detailed discussion with the patient and/or guardian regarding: the cp  

      historical points, exam findings, and any diagnostic results supporting the                 

      discharge/admit diagnosis, the need for outpatient follow up, a rheumatologist, to          

      return to the emergency department if symptoms worsen or persist or if there are any        

      questions or concerns that arise at home.                                                   

                                                                                                  

Administered Medications:                                                                         

No medications were administered                                                                  

                                                                                                  

                                                                                                  

Disposition Summary:                                                                              

22 13:16                                                                                    

Discharge Ordered                                                                                 

      Location: Home                                                                          cp  

      Problem: chronic                                                                        cp  

      Symptoms: are unchanged                                                                 cp  

      Condition: Stable                                                                       cp  

      Diagnosis                                                                                   

        - Encounter for issue of repeat prescription                                          cp  

      Followup:                                                                               cp  

        - With: Private Physician                                                                  

        - When: 2 - 3 days                                                                         

        - Reason: Recheck today's complaints                                                       

      Discharge Instructions:                                                                     

        - Discharge Summary Sheet                                                             cp  

        - Medicine Refill at the Emergency Department                                         cp  

      Forms:                                                                                      

        - Medication Reconciliation Form                                                      cp  

        - Thank You Letter                                                                    cp  

        - Antibiotic Education                                                                cp  

        - Prescription Opioid Use                                                             cp  

      Prescriptions:                                                                              

        - Prednisone 20 mg Oral Tablet                                                             

            - take 1 tablet by ORAL route once daily .; 30 tablet; Refills: 0, Product        cp  

      Selection Permitted                                                                         

Signatures:                                                                                       

Ángela Hamilton RN                     RN   iw                                                   

Mitch Caro PA                         PA   cp                                                   

                                                                                                  

Corrections: (The following items were deleted from the chart)                                    

13:12 13:10 This 56 yrs old Male presents to ER via Ambulatory with complaints of Nausea. cp  cp  

13:13 13:10 Patient reports he is currently in process of finding new physician. cp           cp  

13:13 13:10 Patient reports he is currently in process of finding new physician and reports   cp  

      nausea and headache yesterday. Ran out of prednisone 1 week ago. cp                         

                                                                                                  

**************************************************************************************************

## 2022-05-06 NOTE — XMS REPORT
Continuity of Care Document

                             Created on:May 6, 2022



Patient:REMY ROACH CHETAN

Sex:Male

:1965

External Reference #:[id number]





Demographics







                          Address                   [address]



                                                    [place] [address]

 

                          Home Phone                [phone]

 

                          Mobile Phone              [phone]

 

                          Email Address             [email or web address]

 

                          Preferred Language        English

 

                          Marital Status            Unknown

 

                          Mosque Affiliation     Unknown

 

                          Race                      Unknown

 

                          Additional Race(s)        Unavailable

 

                          Ethnic Group              Unknown









Author







                          Organization              [facility]

t

 

                          Address                   [address]



                                                    [place] [address]

 

                          Phone                     [phone]









Support







                Name            Relationship    Address         Phone

 

                DOC            X               [phone]

[phone]



                                                [place] [address] 









Care Team Providers







                    Name                Role                Phone

 

                    PCP,  DOES NOT HAVE A Primary Care Physician Unavailable

 

                    SINGER              Attending Clinician Unavailable

 

                    Singer DO           Attending Clinician [phone]

 

                    Doctor Unassigned,  Name Attending Clinician Unavailable

 

                    Camden MILLAN  G       Attending Clinician [phone]

 

                    Daniel PEREYRA           Attending Clinician Unavailable

 

                    [facility]  Attending Clinician Unavailable

 

                    Sugar ANDREP           Attending Clinician [phone]

 

                    SINGER              Admitting Clinician Unavailable









Payers







           Payer Name Policy Type Policy Number Effective Date Expiration Date S

ource







Problems







       Condition Condition Condition Status Onset  Resolution Last   Treating Co

mments 

Source



       Name   Details Category        Date   Date   Treatment Clinician        



                                                 Date                 

 

       No known No known Disease                                           NPI:1

83



       active active                                                  [id number]



       problems problems                                                  







Allergies, Adverse Reactions, Alerts







       Allergy Allergy Status Severity Reaction(s) Onset  Inactive Treating Comm

ents 

Source



       Name   Type                        Date   Date   Clinician        

 

       NO KNOWN Drug   Active                                           NPI:183



       ALLERGIE Class                                                   [id number]



       S                                                              







Social History







           Social Habit Start Date Stop Date  Quantity   Comments   Source

 

           Exposure to                       Not sure              NPI:[id number]

1



           SARS-CoV-2                                             



           (event)                                                

 

           Alcohol intake 2021 Current               NPI:[id number]

[id number]



                      00:00:00   00:00:00   non-drinker of            



                                            alcohol (finding)            

 

           Tobacco use and 2018 Never used            NPI:80739

[id number]



           exposure   00:00:00   00:00:00                         

 

           Sex Assigned At 1965                       NPI:07284

[id number]



           Birth      00:00:00   00:00:00                         









                Smoking Status  Start Date      Stop Date       Source

 

                Current every day smoker 2018 00:00:00                 NPI

:[id number]







Medications







       Ordered Filled Start  Stop   Current Ordering Indication Dosage Frequency

 Signature

                    Comments            Components          Source



     Medication Medication Date Date Medication? Clinician                (SIG) 

          



     Name Name                                                   

 

     naproxen            Yes       [id number] 550mg      Take 1           

NPI:183



     sodium      -23                               tablet by           [id number]



     (ANAPROX      00:00:                               mouth 2           



     DS) 550 mg      00                                 (two)           



     tablet                                         times           



                                                  daily with           



                                                  meals.           

 

     predniSONE            Yes       [id number]           Take bid          

 NPI:183



     10 mg      2-02                               for 1 week           [id number]



     tablet      00:00:                               then QD           



               00                                 thereafter           



                                                  for lupus           

 

     predniSONE            Yes       [id number]           Take bid          

 NPI:183



     10 mg      2-02                               for 1 week           [id number]



     tablet      00:00:                               then QD           



               00                                 thereafter           



                                                  for lupus           

 

     predniSONE            Yes       [id number]           Take bid          

 NPI:183



     10 mg      2-02                               for 1 week           [id number]



     tablet      00:00:                               then QD           



               00                                 thereafter           



                                                  for lupus           

 

     predniSONE      -2022- No        [id number]           Take bid         

  NPI:183



     10 mg      2-02 02-02                          for 1 week           [id number]



     tablet      00:00: 00:00                          then QD           



               00   :00                           thereafter           



                                                  for lupus           

 

     predniSONE      -2021- No        [id number]           Take 1          

 NPI:183



     5 mg tablet                                tablet by           13

17690



               00:00: 04:59                          mouth           



               00   :00                           daily for           



                                                  7 days,           



                                                  THEN 0.5           



                                                  tablets           



                                                  daily for           



                                                  7 days.           

 

     predniSONE      -2021- No        [id number]           Take 1          

 NPI:183



     5 mg tablet                                tablet by           13

58430



               00:00: 04:59                          mouth           



               00   :00                           daily for           



                                                  7 days,           



                                                  THEN 0.5           



                                                  tablets           



                                                  daily for           



                                                  7 days.           

 

     predniSONE      -2021- No             20mg      Take 20 mg           

NPI:183



     (DELTASONE)      -                          by mouth           131

8781



     20 mg      21:01: 00:00                          daily.           



     tablet      23   :00                                          

 

     predniSONE      -2021- No             20mg      Take 20 mg           

NPI:183



     (DELTASONE)      --                          by mouth           131

8781



     20 mg      21:01: 00:00                          daily.           



     tablet      23   :00                                          

 

     predniSONE      -2021- No        [id number]           Take 0.5        

   NPI:183



     20 mg      8-25 09-09                          tablets by           [id number]



     tablet      00:00: 04:59                          mouth 2           



               00   :00                           (two)           



                                                  times           



                                                  daily for           



                                                  7 days,           



                                                  THEN 0.5           



                                                  tablets           



                                                  daily for           



                                                  7 days.           

 

     predniSONE      2021- No        [id number]           Take 0.5        

   NPI:183



     20 mg      8-25 09-                          tablets by           [id number]



     tablet      00:00: 04:59                          mouth 2           



               00   :00                           (two)           



                                                  times           



                                                  daily for           



                                                  7 days,           



                                                  THEN 0.5           



                                                  tablets           



                                                  daily for           



                                                  7 days.           

 

     methylPREDN      2018-0      Yes            84mg      Take 21           NPI

:183



     ISolone      5-07                               tablets by           [id number]

1



     (MEDROL,      00:00:                               mouth           



     TEGAN,) 4 mg      00                                 SEE-INSTRU           



     tablets                                         CTIONS.           



                                                  follow           



                                                  package           



                                                  directions           

 

     methylPREDN      2018-0      Yes            84mg      Take 21           NPI

:183



     ISolone      5-07                               tablets by           [id number]

1



     (MEDROL,      00:00:                               mouth           



     TEGAN,) 4 mg      00                                 SEE-INSTRU           



     tablets                                         CTIONS.           



                                                  follow           



                                                  package           



                                                  directions           

 

     methylPREDN      2018-0      Yes            84mg      Take 21           NPI

:183



     ISolone      5-07                               tablets by           000051

1



     (MEDROL,      00:00:                               mouth           



     TEGAN,) 4 mg      00                                 SEE-INSTRU           



     tablets                                         CTIONS.           



                                                  follow           



                                                  package           



                                                  directions           

 

     methylPREDN      2018-0      Yes            84mg      Take 21           NPI

:183



     ISolone      5-07                               tablets by           [id number]

1



     (MEDROL,      00:00:                               mouth           



     TEGAN,) 4 mg      00                                 SEE-INSTRU           



     tablets                                         CTIONS.           



                                                  follow           



                                                  package           



                                                  directions           

 

     methylPREDN      2018-0 - No             84mg      Take 21           NP

I:183



     ISolone      5-07 08-25                          tablets by           [id number]

81



     (MEDROL,      00:00: 00:00                          mouth           



     TEGAN,) 4 mg      00   :00                           SEE-INSTRU           



     tablets                                         CTIONS.           



                                                  follow           



                                                  package           



                                                  directions           

 

     methylPREDN      2018-0 - No             84mg      Take 21           NP

I:183



     ISolone      5-07 08-25                          tablets by           15283

81



     (MEDROL,      00:00: 00:00                          mouth           



     TEGAN,) 4 mg      00   :00                           SEE-INSTRU           



     tablets                                         CTIONS.           



                                                  follow           



                                                  package           



                                                  directions           

 

     predniSONE      2018-0      Yes            20mg      Take 20 mg           N

PI:183



     (DELTASONE)      4-12                               by mouth           1318

781



     20 mg      14:00:                               daily.           



     tablet      56                                                

 

     predniSONE      2018-0      Yes            20mg      Take 20 mg           N

PI:183



     (DELTASONE)      4-12                               by mouth           1318

781



     20 mg      14:00:                               daily.           



     tablet      56                                                

 

     predniSONE      2018-0      Yes            20mg      Take 20 mg           N

PI:183



     (DELTASONE)      4-12                               by mouth           1318

781



     20 mg      14:00:                               daily.           



     tablet      56                                                

 

     predniSONE      2018-0      Yes            20mg      Take 20 mg           N

PI:183



     (DELTASONE)      4-12                               by mouth           1318

781



     20 mg      14:00:                               daily.           



     tablet      56                                                

 

     traMADOL 50      2018-0      Yes            50mg      Take 1           NPI:

183



     mg tablet      2-19                               tablet by           09889

81



               00:00:                               mouth           



               00                                 every 4           



                                                  (four)           



                                                  hours as           



                                                  needed for           



                                                  Pain           



                                                  (scale           



                                                  4-6) or           



                                                  Pain           



                                                  unrelieved           



                                                  by             



                                                  non-narcot           



                                                  ic             



                                                  analgesics           



                                                  .              

 

     traMADOL 50      -0      Yes            50mg      Take 1           NPI:

183



     mg tablet      2-19                               tablet by           47609

81



               00:00:                               mouth           



               00                                 every 4           



                                                  (four)           



                                                  hours as           



                                                  needed for           



                                                  Pain           



                                                  (scale           



                                                  4-6) or           



                                                  Pain           



                                                  unrelieved           



                                                  by             



                                                  non-narcot           



                                                  ic             



                                                  analgesics           



                                                  .              

 

     traMADOL 50      -0      Yes            50mg      Take 1           NPI:

183



     mg tablet      2-19                               tablet by           04543

81



               00:00:                               mouth           



               00                                 every 4           



                                                  (four)           



                                                  hours as           



                                                  needed for           



                                                  Pain           



                                                  (scale           



                                                  4-6) or           



                                                  Pain           



                                                  unrelieved           



                                                  by             



                                                  non-narcot           



                                                  ic             



                                                  analgesics           



                                                  .              

 

     traMADOL 50      -0      Yes            50mg      Take 1           NPI:

183



     mg tablet      2-19                               tablet by           91652

81



               00:00:                               mouth           



               00                                 every 4           



                                                  (four)           



                                                  hours as           



                                                  needed for           



                                                  Pain           



                                                  (scale           



                                                  4-6) or           



                                                  Pain           



                                                  unrelieved           



                                                  by             



                                                  non-narcot           



                                                  ic             



                                                  analgesics           



                                                  .              

 

     traMADOL 50      -2021- No             50mg      Take 1           NPI

:183



     mg tablet      2--                          tablet by           1318

781



               00:00: 00:00                          mouth           



               00   :00                           every 4           



                                                  (four)           



                                                  hours as           



                                                  needed for           



                                                  Pain           



                                                  (scale           



                                                  4-6) or           



                                                  Pain           



                                                  unrelieved           



                                                  by             



                                                  non-narcot           



                                                  ic             



                                                  analgesics           



                                                  .              

 

     traMADOL 50      -0 - No             50mg      Take 1           NPI

:183



     mg tablet      2--25                          tablet by           1318

781



               00:00: 00:00                          mouth           



               00   :00                           every 4           



                                                  (four)           



                                                  hours as           



                                                  needed for           



                                                  Pain           



                                                  (scale           



                                                  4-6) or           



                                                  Pain           



                                                  unrelieved           



                                                  by             



                                                  non-narcot           



                                                  ic             



                                                  analgesics           



                                                  .              

 

     acetaminoph      2018-0      Yes                      1-2 by           NPI:

183



     en-codeine      2-                               mouth           [id number]



     (TYLENOL-CO      00:00:                               every 4-6           



     DEINE #3)      00                                 hours as           



     300-30 mg                                         needed prn           



     tablet                                         pain           

 

     acetaminoph      20180      Yes                      1-2 by           NPI:

183



     en-codeine      2-02                               mouth           [id number]



     (TYLENOL-CO      00:00:                               every 4-6           



     DEINE #3)      00                                 hours as           



     300-30 mg                                         needed prn           



     tablet                                         pain           

 

     acetaminoph            Yes                      1-2 by           NPI:

183



     en-codeine      2-02                               mouth           [id number]



     (TYLENOL-CO      00:00:                               every 4-6           



     DEINE #3)      00                                 hours as           



     300-30 mg                                         needed prn           



     tablet                                         pain           

 

     acetaminoph            Yes                      1-2 by           NPI:

183



     en-codeine      2-02                               mouth           [id number]



     (TYLENOL-CO      00:00:                               every 4-6           



     DEINE #3)      00                                 hours as           



     300-30 mg                                         needed prn           



     tablet                                         pain           

 

     acetaminoph       202- No                       1-2 by           NPI

:183



     en-codeine      2-02 -25                          mouth           [id number]



     (TYLENOL-CO      00:00: 00:00                          every 4-6           



     DEINE #3)      00   :00                           hours as           



     300-30 mg                                         needed prn           



     tablet                                         pain           

 

     acetaminoph       202- No                       1-2 by           NPI

:183



     en-codeine      2-02 08-25                          mouth           [id number]



     (TYLENOL-CO      00:00: 00:00                          every 4-6           



     DEINE #3)      00   :00                           hours as           



     300-30 mg                                         needed prn           



     tablet                                         pain           

 

     docusate            Yes            100mg      Take 1           NPI:18

3



     (COLACE)      4-20                               capsule by           99706

81



     100 mg      00:00:                               mouth 3           



     capsule      00                                 (three)           



                                                  times           



                                                  daily.           

 

     Polyethylen            Yes            1{packe      Take 1           N

PI:183



     e Glycol      4-20                     t}        Packet by           [id number]

1



     3350      00:00:                               mouth 2           



     (MIRALAX)      00                                 (two)           



     17 gram                                         times           



     powder                                         daily.           

 

     docusate      0      Yes            100mg      Take 1           NPI:18

3



     (COLACE)      4-20                               capsule by           04528

81



     100 mg      00:00:                               mouth 3           



     capsule      00                                 (three)           



                                                  times           



                                                  daily.           

 

     Polyethylen      -      Yes            1{packe      Take 1           N

PI:183



     e Glycol      4-20                     t}        Packet by           [id number]

1



     3350      00:00:                               mouth 2           



     (MIRALAX)      00                                 (two)           



     17 gram                                         times           



     powder                                         daily.           

 

     docusate            Yes            100mg      Take 1           NPI:18

3



     (COLACE)      4-20                               capsule by           39441

81



     100 mg      00:00:                               mouth 3           



     capsule      00                                 (three)           



                                                  times           



                                                  daily.           

 

     docusate            Yes            100mg      Take 1           NPI:18

3



     (COLACE)      4-20                               capsule by           34723

81



     100 mg      00:00:                               mouth 3           



     capsule      00                                 (three)           



                                                  times           



                                                  daily.           

 

     Polyethylen            Yes            1{packe      Take 1           N

PI:183



     e Glycol      4-20                     t}        Packet by           [id number]

1



     3350      00:00:                               mouth 2           



     (MIRALAX)      00                                 (two)           



     17 gram                                         times           



     powder                                         daily.           

 

     Polyethylen            Yes            1{packe      Take 1           N

PI:183



     e Glycol      4-20                     t}        Packet by           [id number]

1



     3350      00:00:                               mouth 2           



     (MIRALAX)      00                                 (two)           



     17 gram                                         times           



     powder                                         daily.           

 

     docusate      2021- No             100mg      Take 1           NPI:1

83



     (COLACE)      4-20 08-25                          capsule by           1318

781



     100 mg      00:00: 00:00                          mouth 3           



     capsule      00   :00                           (three)           



                                                  times           



                                                  daily.           

 

     Polyethylen      2021- No             1{packe      Take 1           

NPI:183



     e Glycol      4-20 08-25                t}        Packet by           [id number]

81



     3350      00:00: 00:00                          mouth 2           



     (MIRALAX)      00   :00                           (two)           



     17 gram                                         times           



     powder                                         daily.           

 

     docusate      2021- No             100mg      Take 1           NPI:1

83



     (COLACE)      4-20 08-25                          capsule by           1318

781



     100 mg      00:00: 00:00                          mouth 3           



     capsule      00   :00                           (three)           



                                                  times           



                                                  daily.           

 

     Polyethylen      2021- No             1{packe      Take 1           

NPI:183



     e Glycol      4-20 08-25                t}        Packet by           [id number]

81



     3350      00:00: 00:00                          mouth 2           



     (MIRALAX)      00   :00                           (two)           



     17 gram                                         times           



     powder                                         daily.           







Vital Signs







             Vital Name   Observation Time Observation Value Comments     Source

 

             Systolic blood pressure 2022 18:52:58 140 mm[Hg]             

   NPI:[id number]

 

             Diastolic blood 2022 18:52:58 82 mm[Hg]                 NPI:1

[id number]



             pressure                                            

 

             Heart rate   2022 18:52:58 88 /min                   NPI:1831

[id number]

 

             Respiratory rate 2022 18:52:58 18 /min                   NPI:

[id number]

 

             Oxygen saturation in 2022 18:52:58 99 /min                   

NPI:[id number]



             Arterial blood by Pulse                                        



             oximetry                                            

 

             Body temperature 2022 16:31:00 36.44 Ameena                 NPI:

[id number]

 

             Body weight  2022 16:30:00 90.719 kg                 NPI:1831

[id number]

 

             BMI          2022 16:30:00 26.39 kg/m2               NPI:1831

[id number]

 

             Systolic blood pressure 2022 18:06:00 143 mm[Hg]             

   NPI:[id number]

 

             Diastolic blood 2022 18:06:00 91 mm[Hg]                 NPI:1

[id number]



             pressure                                            

 

             Heart rate   2022 18:04:00 60 /min                   NPI:1831

[id number]

 

             Body temperature 2022 18:04:00 37.06 Ameena                 NPI:

[id number]

 

             Respiratory rate 2022 18:04:00 18 /min                   NPI:

[id number]

 

             Body weight  2022 18:04:00 90.719 kg                 NPI:1831

[id number]

 

             BMI          2022 18:04:00 26.39 kg/m2               NPI:1831

[id number]

 

             Oxygen saturation in 2022 18:04:00 96 /min                   

NPI:[id number]



             Arterial blood by Pulse                                        



             oximetry                                            

 

             Systolic blood pressure 2021 20:25:00 149 mm[Hg]             

   NPI:[id number]

 

             Diastolic blood 2021 20:25:00 110 mm[Hg]                NPI:1

[id number]



             pressure                                            

 

             Heart rate   2021 20:25:00 75 /min                   NPI:1831

[id number]

 

             Body temperature 2021 20:25:00 36.33 Ameena                 NPI:

[id number]

 

             Respiratory rate 2021 20:25:00 18 /min                   NPI:

[id number]

 

             Body weight  2021 20:25:00 90.719 kg                 NPI:1831

[id number]

 

             BMI          2021 20:25:00 26.39 kg/m2               NPI:1831

[id number]

 

             Oxygen saturation in 2021 20:25:00 95 /min                   

NPI:[id number]



             Arterial blood by Pulse                                        



             oximetry                                            

 

             Systolic blood pressure 2020 15:29:00 156 mm[Hg]             

   NPI:[id number]

 

             Diastolic blood 2020 15:29:00 97 mm[Hg]                 NPI:1

[id number]



             pressure                                            

 

             Heart rate   2020 15:29:00 66 /min                   NPI:1831

[id number]

 

             Body temperature 2020 15:22:00 36.67 Ameena                 NPI:

[id number]

 

             Respiratory rate 2020 15:22:00 18 /min                   NPI:

[id number]

 

             Body height  2020 15:22:00 185.4 cm                  NPI:1831

[id number]

 

             Body weight  2020 15:22:00 90.719 kg                 NPI:1831

[id number]

 

             BMI          2020 15:22:00 26.39 kg/m2               NPI:1831

[id number]

 

             Oxygen saturation in 2020 15:22:00 95 /min                   

NPI:[id number]



             Arterial blood by Pulse                                        



             oximetry                                            







Procedures







                Procedure       Date / Time Performed Performing Clinician Sourc

e

 

                XR KNEE 3 VW LEFT 2022 16:49:35 Celina Lan NPI:[id number]

[id number]

 

                CONSENT/REFUSAL FOR 2022 16:22:18 Doctor Unassigned, No NP

[id number]



                DIAGNOSIS AND TREATMENT                 Name            

 

                COMP. METABOLIC PANEL 2022 19:06:00 Celina Lan NPI:18

[id number]



                ([id number])                                         

 

                CBC WITH DIFF   2022 19:06:00 Celina Lan NPI:[id number]

81

 

                NOTICE OF PRIVACY 2022 17:29:34 Doctor Unassigned, No NPI:

[id number]



                PRACTICES                       Name            

 

                CONSENT/REFUSAL FOR 2022 17:29:14 Doctor Unassigned, No NP

[id number]



                DIAGNOSIS AND TREATMENT                 Name            

 

                CONSENT/REFUSAL FOR 2021 20:11:27 Doctor Unassigned, No NP

I:[id number]



                DIAGNOSIS AND TREATMENT                 Name            







Encounters







        Start   End     Encounter Admission Attending Care    Care    Encounter 

Source



        Date/Time Date/Time Type    Type    Clinicians Facility Department ID   

   

 

        2021         Emergency                 [facility]    [id number] 

NPI:183



        18:06:45                                                         [id number]

 

        [facility]     [id number]

43 NPI:183



        10:34:00 12:59:00                 CELINA                         [id number]

1

 

        2022 [facility]    [id number]

5804 NPI:183



        10:34:00 12:59:00                 Celina HARPER 350.1.13.10         1

[id number]



                                                [place] 4.2.7.2.686         



                                                [place]  [phone]         



                                                        2022 Orders          Doctor CHAPMAN    [id number]

96 NPI:183



        00:00:00 00:00:00 Only            Unassigned, DORIS   350.1.13.10       

  [id number]



                                        [address]2.7.2.686         



                                                        [phone]         



                                                        009             

 

        20[facility]     [id number]

58 NPI:183



        12:06:00 13:47:00                 CELINA                         [id number]

1

 

        2022 [facility]    [id number]

4684 NPI:183



        12:06:00 13:47:00                 Celina HARPER 350.1.13.10         1

[id number]



                                                [place] 4.2.7.2.686         



                                                [place]  [phone]         



                                                        4             

 

        2021 [facility]    [id number]

0722 NPI:183



        15:27:00 16:34:00                 Annemarie Harper 350.1.13.10         

[id number]



                                                [place] 4.2.7.2.686         



                                                [place]  [phone]         



                                                        2021 Orders          Doctor  ADELA    [id number]

83 NPI:183



        00:00:00 00:00:00 Only            UnassignedDORIS   350.1.13.10       

  [id number]



                                        [facility] 4.2.7.2.686         



                                                        [phone]         



                                                        009             

 

        2020 Telephone         ADELA Sanchez    [id number]

6715 NPI:183



        00:00:00 00:00:00                 Chiquis GEORGE   350.1.13.10         13

[id number]



                                                [facility] 4.2.7.2.686         



                                                        [phone]         



                                                        019             

 

        2020 Urgent          [facility]    1.

2.840.114 [id number] 

NPI:183



        10:06:34 10:26:34 Care            [facility]  350.1.13.10    

     [id number]



                                                [place] 4.2.7.2.686         



                                                [place] [phone]         



                                                nal     [address]             



                                                Office                  



                                                Building                 



                                                One                     

 

        2020 Outpatient R               [facility]    [id number]

[id number] NPI:183



        10:00:00 10:00:00                                                 [id number]

1







Results







           Test Description Test Time  Test Comments Results    Result Comments 

Source









                    COMP. METABOLIC PANEL ([id number]) 2022 19:24:53 









                      Test Item  Value      Reference Range Interpretation Comme

nts









             NA (test code = 5892659932) 139 mmol/L   135-145                   

 

             K (test code = 5895423630) 4.3 mmol/L   3.5-5.0                   

 

             CL (test code = 8578670390) 102 mmol/L                       

 

             CO2 TOTAL (test code = 1122795372) 32 mmol/L    23-31        H     

       

 

             AGAP (test code = 7406242235)              2-16                    

  

 

             BUN (test code = 4567465775) 3 mg/dL      7-23         L           

 

 

             GLUCOSE (test code = 3057674716) 90 mg/dL                    

     

 

             CREATININE (test code = 0.59 mg/dL   0.60-1.25    L            



             0068725119)                                         

 

             TOTAL BILI (test code = 0.6 mg/dL    0.1-1.1                   



             5122779686)                                         

 

             CALCIUM (test code = 2721322189) 8.1 mg/dL    8.6-10.6     L       

     

 

             T PROTEIN (test code = 0957216783) 7.3 g/dL     6.3-8.2            

       

 

             ALBUMIN (test code = 5654593830) 3.8 g/dL     3.5-5.0              

     

 

             ALK PHOS (test code = 7648921369) 88 U/L                     

      

 

             ALTv (test code = 1742-6) 10 U/L       5-50                      

 

             AST(SGOT) (test code = 8179473565) 26 U/L       13-40              

       

 

             eGFR (test code = 0845163027)              mL/min/1.73m2           

   

 

             LISANDRA (test code = LISANDRA) [employer]                    

       



                          Filtration Rate (GFR) and Staging                     

      



                          of Kidney Disease*                           



                          +-----------------------+--------                     

      



                          -------------+-------------------                     

      



                          ------+| GFR (mL/min/1.73 m2) ?|                      

     



                          With Kidney Damage ?| ?Without                        

   



                          Kidney                                 



                          Damage+-----------------------+--                     

      



                          -------------------+-------------                     

      



                          ------------+| ?>90 ? ? ? ? ? ? ?                     

      



                          ? ?| ?Stage one ? ? ? ? ?| ?                          

 



                          Normal ? ? ? ? ? ? ?                           



                          ?+-----------------------+-------                     

      



                          --------------+------------------                     

      



                          -------+| ?60-89 ? ? ? ? ? ? ? ?|                     

      



                          ?Stage two ? ? ? ? ?| ? Decreased                     

      



                          GFR ? ? ? ?                            



                          +-----------------------+--------                     

      



                          -------------+-------------------                     

      



                          ------+| ?30-59 ? ? ? ? ? ? ? ?|                      

     



                          ?Stage three ? ? ? ?| ? Stage                         

  



                          three ? ? ? ? ?                           



                          +-----------------------+--------                     

      



                          -------------+-------------------                     

      



                          ------+| ?15-29 ? ? ? ? ? ? ? ?|                      

     



                          ?Stage four ? ? ? ? | ? Stage                         

  



                          four ? ? ? ? ?                           



                          ?+-----------------------+-------                     

      



                          --------------+------------------                     

      



                          -------+| ?<15 (or dialysis) ? ?|                     

      



                          ?Stage five ? ? ? ? | ? Stage                         

  



                          five ? ? ? ? ?                           



                          ?+-----------------------+-------                     

      



                          --------------+------------------                     

      



                          -------+ *Each stage assumes the                      

     



                          associated GFR level has been in                      

     



                          effect for at least three months.                     

      



                          ?Stages 1 to 5, with or without                       

    



                          kidney disease, indicate chronic                      

     



                          kidney disease. Notes:                           



                          Determination of stages one and                       

    



                          two (with eGFR >59mL/min/1.73 m2)                     

      



                          requires estimation of kidney                         

  



                          damage for at least three months                      

     



                          as defined by structural or                           



                          functional abnormalities of the                       

    



                          kidney, manifested by                           



                          either:Pathological abnormalities                     

      



                          or Markers of kidney damage                           



                          (including abnormalities in the                       

    



                          composition of the blood or urine                     

      



                          or abnormalities in imaging                           



                          tests).                                

 

             Lab Interpretation (test code = Abnormal                           

    



             87218-3)                                            



NPI:[id number] WITH [id number] 19:22:10





             Test Item    Value        Reference Range Interpretation Comments

 

             WBC (test code =              See_Comment                [Automated



             5690-2)                                             message] The sy

stem



                                                                 which generated



                                                                 this result



                                                                 transmitted



                                                                 reference range

:



                                                                 4.20 - 10.70



                                                                 10*3/?L. The



                                                                 reference range

 was



                                                                 not used to



                                                                 interpret this



                                                                 result as



                                                                 normal/abnormal

.

 

             RBC (test code =              See_Comment                [Automated



             789-8)                                              message] The sy

stem



                                                                 which generated



                                                                 this result



                                                                 transmitted



                                                                 reference range

:



                                                                 4.26 - 5.52



                                                                 10*6/?L. The



                                                                 reference range

 was



                                                                 not used to



                                                                 interpret this



                                                                 result as



                                                                 normal/abnormal

.

 

             HGB (test code = 14.4 g/dL    12.2-16.4                 



             718-7)                                              

 

             HCT (test code = 45.3 %       38.4-49.3                 



             4544-3)                                             

 

             MCV (test code = 97.6 fL      81.7-95.6    H            



             787-2)                                              

 

             MCH (test code = 31.0 pg      26.1-32.7                 



             785-6)                                              

 

             MCHC (test code = 31.8 g/dL    31.2-35.0                 



             786-4)                                              

 

             RDW-SD (test code = 46.4 fL      38.5-51.6                 



             34666-1)                                            

 

             RDW-CV (test code = 12.9 %       12.1-15.4                 



             788-0)                                              

 

             PLT (test code =              See_Comment                [Automated



             777-3)                                              message] The sy

stem



                                                                 which generated



                                                                 this result



                                                                 transmitted



                                                                 reference range

:



                                                                 150 - 328 10*3/

?L.



                                                                 The reference r

gwendolyn



                                                                 was not used to



                                                                 interpret this



                                                                 result as



                                                                 normal/abnormal

.

 

             MPV (test code = 11.7 fL      9.8-13.0                  



             32795-9)                                            

 

             NRBC/100 WBC (test              See_Comment                [Automat

ed



             code = 4495140568)                                        message] 

The system



                                                                 which generated



                                                                 this result



                                                                 transmitted



                                                                 reference range

:



                                                                 0.0 - 10.0 /100



                                                                 WBCs. The refer

ence



                                                                 range was not u

sed



                                                                 to interpret th

is



                                                                 result as



                                                                 normal/abnormal

.

 

             NRBC x10^3 (test code <0.01        See_Comment                [Auto

mated



             = [id number])                                        message] The s

ystem



                                                                 which generated



                                                                 this result



                                                                 transmitted



                                                                 reference range

:



                                                                 10*3/?L. The



                                                                 reference range

 was



                                                                 not used to



                                                                 interpret this



                                                                 result as



                                                                 normal/abnormal

.

 

             GRAN MAT (NEUT) % 68.0 %                                 



             (test code = 770-8)                                        

 

             IMM GRAN % (test code 0.50 %                                 



             = [id number])                                        

 

             LYMPH % (test code = 16.9 %                                 



             736-9)                                              

 

             MONO % (test code = 10.5 %                                 



             5905-5)                                             

 

             EOS % (test code = 3.4 %                                  



             713-8)                                              

 

             BASO % (test code = 0.7 %                                  



             706-2)                                              

 

             GRAN MAT x10^3(ANC) 2.98 10*3/uL 1.99-6.95                 



             (test code =                                        



             3334890390)                                         

 

             IMM GRAN x10^3 (test <0.03        0.00-0.06                 



             code = [id number])                                        

 

             LYMPH x10^3 (test code 0.74 10*3/uL 1.09-3.23    L            



             = 731-0)                                            

 

             MONO x10^3 (test code 0.46 10*3/uL 0.36-1.02                 



             = 742-7)                                            

 

             EOS x10^3 (test code = 0.15 10*3/uL 0.06-0.53                 



             711-2)                                              

 

             BASO x10^3 (test code 0.03 10*3/uL 0.01-0.09                 



             = 704-7)                                            

 

             Lab Interpretation Abnormal                               



             (test code = 60278-3)                                        



NPI:[id number]

## 2022-05-06 NOTE — ER
Nurse's Notes                                                                                     

 [facility]                                                                 

Name: Mack Ramírez                                                                                    

Age: 56 yrs                                                                                       

Sex: Male                                                                                         

: 1965                                                                                   

MRN: [id number]                                                                                   

Arrival Date: 2022                                                                          

Time: 12:39                                                                                       

Account#: [id number]                                                                            

Bed 10                                                                                            

Private MD:                                                                                       

Diagnosis: Encounter for issue of repeat prescription                                             

                                                                                                  

Presentation:                                                                                     

                                                                                             

12:55 Chief complaint: Patient states: I'm a lupus pt and I lost my insurance, and has been   iw  

      without his prednisone since Monday, normally takes 20 mg daily , has had nausea and a      

      headache. Coronavirus screen: At this time, the client does not indicate any symptoms       

      associated with coronavirus-19. Ebola Screen: Patient negative for fever greater than       

      or equal to 101.5 degrees Fahrenheit, and additional compatible Ebola Virus Disease         

      symptoms Patient denies exposure to infectious person. Patient denies travel to an          

      Ebola-affected area in the 21 days before illness onset. No symptoms or risks               

      identified at this time. Initial Sepsis Screen: Does the patient meet any 2 criteria?       

      No. Patient's initial sepsis screen is negative. Does the patient have a suspected          

      source of infection? No. Patient's initial sepsis screen is negative. Risk Assessment:      

      Do you want to hurt yourself or someone else? Patient reports no desire to harm self or     

      others. Onset of symptoms was May 06, 2022.                                                 

12:55 Method Of Arrival: Ambulatory                                                           iw  

12:55 Acuity: VERN 4                                                                           iw  

                                                                                                  

Historical:                                                                                       

- Allergies:                                                                                      

12:57 No Known Allergies;                                                                     iw  

- Home Meds:                                                                                      

12:57 prednisone 20 mg oral tab once daily [Active];                                          iw  

- PMHx:                                                                                           

12:57 COPD; Kidney stones; low back pain; Lupus;                                              iw  

                                                                                                  

                                                                                                  

                                                                                                  

Screenin:58 Abuse screen: Denies threats or abuse. Denies injuries from another. Nutritional        iw  

      screening: No deficits noted. Tuberculosis screening: No symptoms or risk factors           

      identified. Fall Risk None identified.                                                      

                                                                                                  

Assessment:                                                                                       

12:58 General: Appears in no apparent distress. Behavior is calm, cooperative. Neuro:         iw  

      Oriented to person, place, time, situation, Moves all extremities. Cardiovascular:          

      Patient's skin is warm and dry. Respiratory: Respiratory effort is even, unlabored.         

      Derm: Skin is healthy with good turgor.                                                     

                                                                                                  

Vital Signs:                                                                                      

12:55  / 101; Pulse 93; Resp 16; Temp 98.9; Pulse Ox 99% on R/A;                        iw  

                                                                                                  

ED Course:                                                                                        

12:39 Patient arrived in ED.                                                                  ds1 

12:57 Triage completed.                                                                       iw  

12:57 Arm band placed on.                                                                     iw  

12:58 Ángela Hamilton RN is Primary Nurse.                                                   iw  

12:58 Mitch Caro PA is PHCP.                                                                cp  

12:58 Shonda Law MD is Attending Physician.                                                cp  

13:19 No provider procedures requiring assistance completed. Patient did not have IV access   iw  

      during this emergency room visit.                                                           

                                                                                                  

Administered Medications:                                                                         

No medications were administered                                                                  

                                                                                                  

                                                                                                  

Outcome:                                                                                          

13:16 Discharge ordered by MD.                                                                cp  

13:28 Patient left the ED.                                                                    iw  

                                                                                                  

Signatures:                                                                                       

Arielle Gamboa                                ds1                                                  

Ángela Hamilton RN                     RN   iw                                                   

Mitch Caro PA                         PA   cp                                                   

                                                                                                  

**************************************************************************************************

## 2022-06-25 ENCOUNTER — HOSPITAL ENCOUNTER (EMERGENCY)
Dept: HOSPITAL 97 - ER | Age: 57
Discharge: HOME | End: 2022-06-25
Payer: SELF-PAY

## 2022-06-25 VITALS — DIASTOLIC BLOOD PRESSURE: 98 MMHG | OXYGEN SATURATION: 99 % | SYSTOLIC BLOOD PRESSURE: 140 MMHG | TEMPERATURE: 99.1 F

## 2022-06-25 DIAGNOSIS — Z76.0: Primary | ICD-10-CM

## 2022-06-25 PROCEDURE — 99282 EMERGENCY DEPT VISIT SF MDM: CPT

## 2022-06-25 NOTE — XMS REPORT
Continuity of Care Document

                            Created on:2022



Patient:REMY ROACH CHETAN

Sex:Male

:1965

External Reference #:[id number]





Demographics







                          Address                   [address]



                                                    [place] [address]

 

                          Home Phone                [phone]

 

                          Mobile Phone              [phone]

 

                          Email Address             [email or web address]

 

                          Preferred Language        English

 

                          Marital Status            Unknown

 

                          Roman Catholic Affiliation     Unknown

 

                          Race                      Unknown

 

                          Additional Race(s)        Unavailable

 

                          Ethnic Group              Unknown









Author







                          Organization              [facility]

t

 

                          Address                   [address]



                                                    [place] [address]

 

                          Phone                     [phone]









Support







                Name            Relationship    Address         Phone

 

                DOC            X               [phone]

[phone]



                                                [place] [address] 









Care Team Providers







                    Name                Role                Phone

 

                    PCP,  DOES NOT HAVE A Primary Care Physician Unavailable

 

                    SINGER              Attending Clinician Unavailable

 

                    Singer DO           Attending Clinician [phone]

 

                    Doctor Unassigned,  Name Attending Clinician Unavailable

 

                    Camden NP,  G       Attending Clinician [phone]

 

                    Daniel RN           Attending Clinician Unavailable

 

                    [facility]  Attending Clinician Unavailable

 

                    Sugar ANDREP           Attending Clinician [phone]

 

                    SINGER              Admitting Clinician Unavailable









Payers







           Payer Name Policy Type Policy Number Effective Date Expiration Date S

ource







Problems







       Condition Condition Condition Status Onset  Resolution Last   Treating Co

mments 

Source



       Name   Details Category        Date   Date   Treatment Clinician        



                                                 Date                 

 

       No known No known Disease                                           [facility]

rs



       active active                                                  ity of



       problems problems                                                  [facility]







Allergies, Adverse Reactions, Alerts







       Allergy Allergy Status Severity Reaction(s) Onset  Inactive Treating Comm

ents 

Source



       Name   Type                        Date   Date   Clinician        

 

       NO KNOWN Drug   Active                                           [facility]



       ALLERGIE Class                                                   [facility]



       S                                                              [facility]







Social History







           Social Habit Start Date Stop Date  Quantity   Comments   Source

 

           Exposure to                       Not sure              [facility] 



           SARS-CoV-2                                             [facility]



           (event)                                                Branch

 

           Alcohol intake 2021 Current               University

 



                      00:00:00   00:00:00   non-drinker of            [place] [facility]

sharona



                                            alcohol               Branch



                                            (finding)             

 

           Tobacco use and 2018 Never used            [facility]

y of



           exposure   00:00:00   00:00:00                         [facility]

 

           Sex Assigned At 1965                       [facility]

y of



           Birth      00:00:00   00:00:00                         [facility]









                Smoking Status  Start Date      Stop Date       Source

 

                Current every day smoker 2018 00:00:00                 [facility]

[facility] [place] [facility]



                                                                Br[facility]







Medications







       Ordered Filled Start  Stop   Current Ordering Indication Dosage Frequency

 Signature

                    Comments            Components          Source



     Medication Medication Date Date Medication? Clinician                (SIG) 

          



     Name Name                                                   

 

     naproxen            Yes       [id number] 550mg      Take 1           

[facility]



     sodium      -                               tablet by           ity of



     (ANAPROX      00:00:                               mouth 2           [place]



     DS) 550 mg      00                                 (two)           Medical



     tablet                                         times           Branch



                                                  daily with           



                                                  meals.           

 

     predniSONE            Yes       [id number]           Take bid          

 [facility]



     10 mg      2-02                               for 1 week           ity of



     tablet      00:00:                               then QD           [place]



               00                                 thereafter           Medical



                                                  for lupus           Branch

 

     predniSONE            Yes       [id number]           Take bid          

 [facility]



     10 mg      2-02                               for 1 week           ity of



     tablet      00:00:                               then QD           [place]



               00                                 thereafter           Medical



                                                  for lupus           Branch

 

     predniSONE            Yes       [id number]           Take bid          

 [facility]



     10 mg      -                               for 1 week           ity of



     tablet      00:00:                               then QD           [place]



               00                                 thereafter           Medical



                                                  for lupus           Branch

 

     predniSONE      -2022- No        [id number]           Take bid         

  [facility]



     10 mg      2--                          for 1 week           ity of



     tablet      00:00: 00:00                          then QD           [place]



               00   :00                           thereafter           Medical



                                                  for lupus           Branch

 

     predniSONE      -2021- No        [id number]           Take 1          

 [facility]



     5 mg tablet                                tablet by           it

y of



               00:00: 04:59                          mouth           [place]



               00   :00                           daily for           Medical



                                                  7 days,           Branch



                                                  THEN 0.5           



                                                  tablets           



                                                  daily for           



                                                  7 days.           

 

     predniSONE      -2021- No        [id number]           Take 1          

 [facility]



     5 mg tablet                                tablet by           it

y of



               00:00: 04:59                          mouth           [place]



               00   :00                           daily for           Medical



                                                  7 days,           Branch



                                                  THEN 0.5           



                                                  tablets           



                                                  daily for           



                                                  7 days.           

 

     predniSONE      -2021- No             20mg      Take 20 mg           

[facility]



     (DELTASONE)      8-25 08-25                          by mouth           ity

 of



     20 mg      21:01: 00:00                          daily.           [place]



     tablet      23   :00                                          Medical



                                                                 Branch

 

     predniSONE      -2021- No             20mg      Take 20 mg           

[facility]



     (DELTASONE)      8-25 08-25                          by mouth           ity

 of



     20 mg      21:01: 00:00                          daily.           [place]



     tablet      23   :00                                          Medical



                                                                 Branch

 

     predniSONE      -2021- No        [id number]           Take 0.5        

   [facility]



     20 mg      8-25 09-09                          tablets by           ity of



     tablet      00:00: 04:59                          mouth 2           [place]



               00   :00                           (two)           Medical



                                                  times           [facility]



                                                  daily for           



                                                  7 days,           



                                                  THEN 0.5           



                                                  tablets           



                                                  daily for           



                                                  7 days.           

 

     predniSONE      2021- No        [id number]           Take 0.5        

   [facility]



     20 mg      8-25 -09                          tablets by           ity of



     tablet      00:00: 04:59                          mouth 2           [place]



               00   :00                           (two)           Medical



                                                  times           [facility]



                                                  daily for           



                                                  7 days,           



                                                  THEN 0.5           



                                                  tablets           



                                                  daily for           



                                                  7 days.           

 

     methylPREDN      2018-0      Yes            84mg      Take 21           Uni

vers



     ISolone      5-07                               tablets by           ity of



     (MEDROL,      00:00:                               mouth           [place]



     TEGAN,) 4 mg      00                                 SEE-INSTRU           Med

ical



     tablets                                         CTIONS.           Branch



                                                  follow           



                                                  package           



                                                  directions           

 

     methylPREDN      2018-0      Yes            84mg      Take 21           Uni

vers



     ISolone      5-07                               tablets by           ity of



     (MEDROL,      00:00:                               mouth           [place]



     TEGAN,) 4 mg      00                                 SEE-INSTRU           Med

ical



     tablets                                         CTIONS.           Branch



                                                  follow           



                                                  package           



                                                  directions           

 

     methylPREDN      2018-0      Yes            84mg      Take 21           Uni

vers



     ISolone      5-07                               tablets by           ity of



     (MEDROL,      00:00:                               mouth           [place]



     TEGAN,) 4 mg      00                                 SEE-INSTRU           Med

ical



     tablets                                         CTIONS.           Branch



                                                  follow           



                                                  package           



                                                  directions           

 

     methylPREDN      2018-0      Yes            84mg      Take 21           Uni

vers



     ISolone      5-07                               tablets by           ity of



     (MEDROL,      00:00:                               mouth           [place]



     TEGAN,) 4 mg      00                                 SEE-INSTRU           Med

ical



     tablets                                         CTIONS.           Branch



                                                  follow           



                                                  package           



                                                  directions           

 

     methylPREDN      -0 - No             84mg      Take 21           Un

radha



     ISolone      5-07 08-25                          tablets by           ity o

f



     (MEDROL,      00:00: 00:00                          mouth           [place]



     TEGAN,) 4 mg      00   :00                           SEE-INSTRU           Med

ical



     tablets                                         CTIONS.           Branch



                                                  follow           



                                                  package           



                                                  directions           

 

     methylPREDN      -0 - No             84mg      Take 21           Un

radha



     ISolone      5-07 08-25                          tablets by           ity o

f



     (MEDROL,      00:00: 00:00                          mouth           [place]



     TEGAN,) 4 mg      00   :00                           SEE-INSTRU           Med

ical



     tablets                                         CTIONS.           Branch



                                                  follow           



                                                  package           



                                                  directions           

 

     predniSONE            Yes            20mg      Take 20 mg           U

nivers



     (DELTASONE)      4-12                               by mouth           ity 

of



     20 mg      14:00:                               daily.           [place]



     tablet      56                                                Medical



                                                                 Branch

 

     predniSONE      2018-0      Yes            20mg      Take 20 mg           U

nivers



     (DELTASONE)      4-12                               by mouth           ity 

of



     20 mg      14:00:                               daily.           [place]



     tablet      56                                                Medical



                                                                 Branch

 

     predniSONE      2018-0      Yes            20mg      Take 20 mg           U

nivers



     (DELTASONE)      4-12                               by mouth           ity 

of



     20 mg      14:00:                               daily.           [place]



     tablet      56                                                Medical



                                                                 Branch

 

     predniSONE      2018-0      Yes            20mg      Take 20 mg           U

nivers



     (DELTASONE)      4-12                               by mouth           ity 

of



     20 mg      14:00:                               daily.           [place]



     tablet      56                                                Medical



                                                                 Branch

 

     traMADOL 50      2018-0      Yes            50mg      Take 1           Univ

ers



     mg tablet      2-19                               tablet by           ity o

f



               00:00:                               mouth           [place]



               00                                 every 4           Medical



                                                  (four)           Branch



                                                  hours as           



                                                  needed for           



                                                  Pain           



                                                  (scale           



                                                  4-6) or           



                                                  Pain           



                                                  unrelieved           



                                                  by             



                                                  non-narcot           



                                                  ic             



                                                  analgesics           



                                                  .              

 

     traMADOL 50      2018-0      Yes            50mg      Take 1           Univ

ers



     mg tablet      2-19                               tablet by           ity o

f



               00:00:                               mouth           [place]



               00                                 every 4           Medical



                                                  (four)           Branch



                                                  hours as           



                                                  needed for           



                                                  Pain           



                                                  (scale           



                                                  4-6) or           



                                                  Pain           



                                                  unrelieved           



                                                  by             



                                                  non-narcot           



                                                  ic             



                                                  analgesics           



                                                  .              

 

     traMADOL 50      2018-0      Yes            50mg      Take 1           Univ

ers



     mg tablet      2-19                               tablet by           ity o

f



               00:00:                               mouth           [place]



               00                                 every 4           Medical



                                                  (four)           Branch



                                                  hours as           



                                                  needed for           



                                                  Pain           



                                                  (scale           



                                                  4-6) or           



                                                  Pain           



                                                  unrelieved           



                                                  by             



                                                  non-narcot           



                                                  ic             



                                                  analgesics           



                                                  .              

 

     traMADOL 50      2018-0      Yes            50mg      Take 1           Univ

ers



     mg tablet      2-19                               tablet by           ity o

f



               00:00:                               mouth           [place]



               00                                 every 4           Medical



                                                  (four)           Branch



                                                  hours as           



                                                  needed for           



                                                  Pain           



                                                  (scale           



                                                  4-6) or           



                                                  Pain           



                                                  unrelieved           



                                                  by             



                                                  non-narcot           



                                                  ic             



                                                  analgesics           



                                                  .              

 

     traMADOL 50      -0 - No             50mg      Take 1           Uni

vers



     mg tablet      2-19 08-25                          tablet by           ity 

of



               00:00: 00:00                          mouth           [place]



               00   :00                           every 4           Medical



                                                  (four)           Branch



                                                  hours as           



                                                  needed for           



                                                  Pain           



                                                  (scale           



                                                  4-6) or           



                                                  Pain           



                                                  unrelieved           



                                                  by             



                                                  non-narcot           



                                                  ic             



                                                  analgesics           



                                                  .              

 

     traMADOL 50      -0 - No             50mg      Take 1           Uni

vers



     mg tablet      2-19 08-25                          tablet by           ity 

of



               00:00: 00:00                          mouth           [place]



               00   :00                           every 4           Medical



                                                  (four)           Branch



                                                  hours as           



                                                  needed for           



                                                  Pain           



                                                  (scale           



                                                  4-6) or           



                                                  Pain           



                                                  unrelieved           



                                                  by             



                                                  non-narcot           



                                                  ic             



                                                  analgesics           



                                                  .              

 

     acetaminoph            Yes                      1-2 by           [facility]

ers



     en-codeine      2-02                               mouth           ity of



     (TYLENOL-CO      00:00:                               every 4-6           [place]

[place]



     DEINE #3)      00                                 hours as           Medica

l



     300-30 mg                                         needed prn           Bran

ch



     tablet                                         pain           

 

     acetaminoph            Yes                      1-2 by           [facility]

ers



     en-codeine      2-02                               mouth           ity of



     (TYLENOL-CO      00:00:                               every 4-6           T

[place]



     DEINE #3)      00                                 hours as           Medica

l



     300-30 mg                                         needed prn           Bran

ch



     tablet                                         pain           

 

     acetaminoph            Yes                      1-2 by           [facility]



     en-codeine      2-02                               mouth           ity of



     (TYLENOL-CO      00:00:                               every 4-6           T

[place]



     DEINE #3)      00                                 hours as           Medica

l



     300-30 mg                                         needed prn           Bran

ch



     tablet                                         pain           

 

     acetaminoph            Yes                      1-2 by           [facility]

ers



     en-codeine      2-02                               mouth           ity of



     (TYLENOL-CO      00:00:                               every 4-6           T

[place]



     DEINE #3)      00                                 hours as           Medica

l



     300-30 mg                                         needed prn           Bran

ch



     tablet                                         pain           

 

     acetaminoph       202- No                       1-2 by           [facility]

vers



     en-codeine      2-02 08-25                          mouth           ity of



     (TYLENOL-CO      00:00: 00:00                          every 4-6           

[place]



     DEINE #3)      00   :00                           hours as           Medica

l



     300-30 mg                                         needed prn           Bran

ch



     tablet                                         pain           

 

     acetaminoph       202- No                       1-2 by           [facility]

vers



     en-codeine      2-02 08-25                          mouth           ity of



     (TYLENOL-CO      00:00: 00:00                          every 4-6           

[place]



     DEINE #3)      00   :00                           hours as           Medica

l



     300-30 mg                                         needed prn           Bran

ch



     tablet                                         pain           

 

     docusate            Yes            100mg      Take 1           Univer

s



     (COLACE)      4-20                               capsule by           ity o

f



     100 mg      00:00:                               mouth 3           [place]



     capsule      00                                 (three)           Medical



                                                  times           Branch



                                                  daily.           

 

     Polyethylen            Yes            1{packe      Take 1           U

nivers



     e Glycol      4-20                     t}        Packet by           ity of



     3350      00:00:                               mouth 2           [place]



     (MIRALAX)      00                                 (two)           Medical



     17 gram                                         times           Branch



     powder                                         daily.           

 

     docusate            Yes            100mg      Take 1           Univer

s



     (COLACE)      4-20                               capsule by           ity o

f



     100 mg      00:00:                               mouth 3           [place]



     capsule      00                                 (three)           Medical



                                                  times           Branch



                                                  daily.           

 

     Polyethylen            Yes            1{packe      Take 1           U

nivers



     e Glycol      4-20                     t}        Packet by           ity of



     3350      00:00:                               mouth 2           [place]



     (MIRALAX)      00                                 (two)           Medical



     17 gram                                         times           Branch



     powder                                         daily.           

 

     docusate            Yes            100mg      Take 1           Univer

s



     (COLACE)      4-20                               capsule by           ity o

f



     100 mg      00:00:                               mouth 3           [place]



     capsule      00                                 (three)           Medical



                                                  times           Branch



                                                  daily.           

 

     docusate            Yes            100mg      Take 1           Univer

s



     (COLACE)      4-20                               capsule by           ity o

f



     100 mg      00:00:                               mouth 3           [place]



     capsule      00                                 (three)           Medical



                                                  times           Branch



                                                  daily.           

 

     Polyethylen            Yes            1{packe      Take 1           U

nivers



     e Glycol      4-20                     t}        Packet by           ity of



     3350      00:00:                               mouth 2           [place]



     (MIRALAX)      00                                 (two)           Medical



     17 gram                                         times           Branch



     powder                                         daily.           

 

     Polyethylen            Yes            1{packe      Take 1           U

nivers



     e Glycol      4-20                     t}        Packet by           ity of



     3350      00:00:                               mouth 2           [place]



     (MIRALAX)      00                                 (two)           Medical



     17 gram                                         times           Branch



     powder                                         daily.           

 

     docusate      2021- No             100mg      Take 1           Unive

rs



     (COLACE)      4-20 08-25                          capsule by           ity 

of



     100 mg      00:00: 00:00                          mouth 3           [place]



     capsule      00   :00                           (three)           Medical



                                                  times           Branch



                                                  daily.           

 

     Polyethylen      2021- No             1{packe      Take 1           

Univers



     e Glycol      4-20 08-25                t}        Packet by           ity o

f



     3350      00:00: 00:00                          mouth 2           [place]



     (MIRALAX)      00   :00                           (two)           Medical



     17 gram                                         times           Branch



     powder                                         daily.           

 

     docusate      2021- No             100mg      Take 1           Unive

rs



     (COLACE)      4-20 08-25                          capsule by           ity 

of



     100 mg      00:00: 00:00                          mouth 3           [place]



     capsule      00   :00                           (three)           Medical



                                                  times           Branch



                                                  daily.           

 

     Polyethylen      2021- No             1{packe      Take 1           

Univers



     e Glycol      4-20 08-25                t}        Packet by           ity o

f



     3350      00:00: 00:00                          mouth 2           [place]



     (MIRALAX)      00   :00                           (two)           Medical



     17 gram                                         times           Branch



     powder                                         daily.           







Vital Signs







             Vital Name   Observation Time Observation Value Comments     Source

 

             Systolic blood 2022 18:52:58 140 mm[Hg]                Univer

sity of



             pressure                                            [place] [facility]



                                                                 Branch

 

             Diastolic blood 2022 18:52:58 82 mm[Hg]                 Unive

rsity of



             pressure                                            [place] [facility]



                                                                 Branch

 

             Heart rate   2022 18:52:58 88 /min                   Universi

ty of



                                                                 [place] [facility]



                                                                 Branch

 

             Respiratory rate 2022 18:52:58 18 /min                   Univ

ersity of



                                                                 [place] [facility]



                                                                 Branch

 

             Oxygen saturation in 2022 18:52:58 99 /min                   

[facility]



             Arterial blood by                                        [place] [facility]

[facility]



             Pulse oximetry                                        Branch

 

             Body temperature 2022 16:31:00 36.44 Ameena                 Univ

ersity of



                                                                 [place] [facility]



                                                                 Branch

 

             Body weight  2022 16:30:00 90.719 kg                 Universi

ty of



                                                                 [place] [facility]



                                                                 Branch

 

             BMI          2022 16:30:00 26.39 kg/m2               Universi

ty of



                                                                 [place] [facility]



                                                                 Branch

 

             Systolic blood 2022 18:06:00 143 mm[Hg]                Univer

sity of



             pressure                                            [place] [facility]



                                                                 Branch

 

             Diastolic blood 2022 18:06:00 91 mm[Hg]                 Unive

rsity of



             pressure                                            [place] [facility]



                                                                 Branch

 

             Heart rate   2022 18:04:00 60 /min                   Universi

ty of



                                                                 [place] [facility]



                                                                 Branch

 

             Body temperature 2022 18:04:00 37.06 Ameena                 Univ

ersity of



                                                                 [place] [facility]



                                                                 Branch

 

             Respiratory rate 2022 18:04:00 18 /min                   Univ

ersity of



                                                                 [place] [facility]



                                                                 Branch

 

             Body weight  2022 18:04:00 90.719 kg                 Universi

ty of



                                                                 [place] [facility]



                                                                 Branch

 

             BMI          2022 18:04:00 26.39 kg/m2               Universi

ty of



                                                                 [place] [facility]



                                                                 Branch

 

             Oxygen saturation in 2022 18:04:00 96 /min                   

[facility]



             Arterial blood by                                        [place] [facility]

sharona



             Pulse oximetry                                        Branch

 

             Systolic blood 2021 20:25:00 149 mm[Hg]                Univer

sity of



             pressure                                            [place] [facility]



                                                                 Branch

 

             Diastolic blood 2021 20:25:00 110 mm[Hg]                Unive

rsity of



             pressure                                            [place] [facility]



                                                                 Branch

 

             Heart rate   2021 20:25:00 75 /min                   Universi

ty of



                                                                 [facility]

 

             Body temperature 2021 20:25:00 36.33 Ameena                 Univ

ersity of



                                                                 [facility]

 

             Respiratory rate 2021 20:25:00 18 /min                   Univ

ersity of



                                                                 [facility]

 

             Body weight  2021 20:25:00 90.719 kg                 Universi

ty of



                                                                 [place] [facility]



                                                                 [facility]

 

             BMI          2021 20:25:00 26.39 kg/m2               Universi

ty of



                                                                 [place] [facility]



                                                                 Sur[facility]

 

             Oxygen saturation in 2021 20:25:00 95 /min                   

[facility]



             Arterial blood by                                        [place] [facility]

sh[facility]ona



             Pulse oximetry                                        Branch

 

             Systolic blood 2020 15:29:00 156 mm[Hg]                Univer

sity of



             [facility]

 

             Diastolic blood 2020 15:29:00 97 mm[Hg]                 Unive

rsity of



             [facility]

 

             Heart rate   2020 15:29:00 66 /min                   Universi

ty of



                                                                 [place] [facility]



                                                                 [facility]

 

             Body temperature 2020 15:22:00 36.67 Ameena                 Univ

ersity of



                                                                 [place] [facility]



                                                                 [facility]

 

             Respiratory rate 2020 15:22:00 18 /min                   [facility]



                                                                 [facility]

 

             Body height  2020 15:22:00 185.4 cm                  Universi

ty of



                                                                 [place] [facility]



                                                                 Sur[facility]

 

             Body weight  2020 15:22:00 90.719 kg                 Universi

ty of



                                                                 [place] [facility]



                                                                 [facility]

 

             BMI          2020 15:22:00 26.39 kg/m2               Universi

ty of



                                                                 [place] [facility]



                                                                 Sur[facility]

 

             Oxygen saturation in 2020 15:22:00 95 /min                   

[facility]



             Arterial blood by                                        [place] [facility]

sh[facility]ona



             Pulse oximetry                                        Branch







Procedures







                Procedure       Date / Time Performed Performing Clinician Sourchristal

e

 

                XR KNEE 3 VW LEFT 2022 16:49:35 Celina Lan [facility]

 

                CONSENT/REFUSAL FOR 2022 16:22:18 Doctor Unassigned, No Un

[facility]



                DIAGNOSIS AND                   Name            Medical Branch



                TREATMENT                                       

 

                COMP. METABOLIC PANEL 2022 19:06:00 Celina Lan

[facility]



                ([id number])                                         [facility]

 

                CBC WITH DIFF   2022 19:06:00 Celina Lan o

f [facility]

 

                NOTICE OF PRIVACY 2022 17:29:34 Doctor Unassigned, No Univ

[facility] [place]



                PRACTICES                       Name            Medical Branch

 

                CONSENT/REFUSAL FOR 2022 17:29:14 Doctor Unassigned, No Un

[facility] 

[place]



                DIAGNOSIS AND                   Name            Medical Branch



                TREATMENT                                       

 

                CONSENT/REFUSAL FOR 2021 20:11:27 Doctor Unassigned, No Un

[facility] of 

[place]



                DIAGNOSIS AND                   Name            Medical Branch



                TREATMENT                                       







Encounters







        Start   End     Encounter Admission Attending Care    Care    Encounter 

Source



        Date/Time Date/Time Type    Type    Clinicians Facility Department ID   

   

 

        2021         Emergency                 [facility]    [id number] 

Univers



        18:06:45                                                         ity of



                                                                        [facility]

 

        2022 Emergency [facility]    ERT     [id number]

43 Univers



        10:34:00 12:59:00                 CELINA soliz [facility]

 

        2022 [facility]         [facility]    [id number]

5804 Univers



        10:34:00 12:59:00                 Celina HARPER [id number]         i

ty of



                                                [place] [id number]         [facility]  [phone]         Medi

sharona



                                                        [address]             Surveyor

 

        2022 Orders          Doctor CHAPMAN    [id number]

96 Univers



        00:00:00 00:00:00 Only            Unassigned, DORIS   [id number]       

  ity of



                                        [facility] [id number]         [place]leg[place]

as



                                                        [phone]         Medi

sharona



                                                        009             Branch

 

        2022 [facility]     [id number]

58 Univers



        12:06:00 13:47:00                 CELINA soliz [facility]

 

        2022 [facility]         [facility]    [id number]

4684 Univers



        12:06:00 13:47:00                 Celina HARPER [id number]         i

ty of



                                                [place] [id number]         [facility]  [phone]         Medi

sharona



                                                        084             Surveyor

 

        2021 Emergency         [facility]    [id number]

0722 Univers



        15:27:00 16:34:00                 Annemarie Harper [id number]         

ity of



                                                [place] [id number]         [place]

[facility]  [phone]         Medi

sharona



                                                        [address]             Surveyor

 

        2021 Orders          Doctor  ADELA    [id number]

83 Univers



        00:00:00 00:00:00 Only            Unassigned, DORIS   350.1.13.10       

  ity of



                                        [facility] [id number]         [place]leg[place]

as



                                                        [phone]         Medi

sharona



                                                        009             Branch

 

        2020 Telephone         ADELA Sanchez    [id number]

6715 Univers



        00:00:00 00:00:00                 Chiquis GEORGE   [id number]         it

y of



                                                HOSPITAL [id number]         [place]leg[facility]

as



                                                        [phone]         Medi

sharona



                                                        [address]             Surveyor

 

        2020 Urgent          [facility]    1.

[id number] 

Univers



        10:06:34 10:26:34 Care            [employer]  [id number]    

     ity of



                                                [place] [id number]         [place]legario

as



                                                [employer] [phone]         Me

dical



                                                [facility]     [address]             Surveyor



                                                Office                  



                                                Building                 



                                                One                     

 

        2020 Outpatient R               [facility]    [id number]

284 Univers



        10:00:00 10:00:00                                                 ity of



                                                                        [facility]







Results







           Test Description Test Time  Test Comments Results    Result Comments 

Source









                    COMP. METABOLIC PANEL ([id number]) 2022 19:24:53 









                      Test Item  Value      Reference Range Interpretation Comme

nts









             NA (test code = 7387984767) 139 mmol/L   135-145                   

 

             K (test code = 7567514350) 4.3 mmol/L   3.5-5.0                   

 

             CL (test code = 2471109417) 102 mmol/L                       

 

             CO2 TOTAL (test code = 5570489718) 32 mmol/L    23-31        H     

       

 

             AGAP (test code = 6442137310)              2-16                    

  

 

             BUN (test code = 3586507636) 3 mg/dL      7-23         L           

 

 

             GLUCOSE (test code = 1468723301) 90 mg/dL                    

     

 

             CREATININE (test code = 0.59 mg/dL   0.60-1.25    L            



             [id number])                                         

 

             TOTAL BILI (test code = 0.6 mg/dL    0.1-1.1                   



             9578483859)                                         

 

             CALCIUM (test code = 1197348084) 8.1 mg/dL    8.6-10.6     L       

     

 

             T PROTEIN (test code = 0093700974) 7.3 g/dL     6.3-8.2            

       

 

             ALBUMIN (test code = 6097793606) 3.8 g/dL     3.5-5.0              

     

 

             ALK PHOS (test code = 9686121666) 88 U/L                     

      

 

             ALTv (test code = 1742-6) 10 U/L       5-50                      

 

             AST(SGOT) (test code = 0382048350) 26 U/L       13-40              

       

 

             eGFR (test code = 5897945897)              mL/min/1.73m2           

   

 

             LISANDRA (test code = LISANDRA) [employer]                    

       



                          Filtration Rate (GFR) and Staging                     

      



                          of Kidney Disease*                           



                          +-----------------------+--------                     

      



                          -------------+-------------------                     

      



                          ------+| GFR (mL/min/1.73 m2) ?|                      

     



                          With Kidney Damage ?| ?Without                        

   



                          Kidney                                 



                          Damage+-----------------------+--                     

      



                          -------------------+-------------                     

      



                          ------------+| ?>90 ? ? ? ? ? ? ?                     

      



                          ? ?| ?Stage one ? ? ? ? ?| ?                          

 



                          Normal ? ? ? ? ? ? ?                           



                          ?+-----------------------+-------                     

      



                          --------------+------------------                     

      



                          -------+| ?60-89 ? ? ? ? ? ? ? ?|                     

      



                          ?Stage two ? ? ? ? ?| ? Decreased                     

      



                          GFR ? ? ? ?                            



                          +-----------------------+--------                     

      



                          -------------+-------------------                     

      



                          ------+| ?30-59 ? ? ? ? ? ? ? ?|                      

     



                          ?Stage three ? ? ? ?| ? Stage                         

  



                          three ? ? ? ? ?                           



                          +-----------------------+--------                     

      



                          -------------+-------------------                     

      



                          ------+| ?15-29 ? ? ? ? ? ? ? ?|                      

     



                          ?Stage four ? ? ? ? | ? Stage                         

  



                          four ? ? ? ? ?                           



                          ?+-----------------------+-------                     

      



                          --------------+------------------                     

      



                          -------+| ?<15 (or dialysis) ? ?|                     

      



                          ?Stage five ? ? ? ? | ? Stage                         

  



                          five ? ? ? ? ?                           



                          ?+-----------------------+-------                     

      



                          --------------+------------------                     

      



                          -------+ *Each stage assumes the                      

     



                          associated GFR level has been in                      

     



                          effect for at least three months.                     

      



                          ?Stages 1 to 5, with or without                       

    



                          kidney disease, indicate chronic                      

     



                          kidney disease. Notes:                           



                          Determination of stages one and                       

    



                          two (with eGFR >59mL/min/1.73 m2)                     

      



                          requires estimation of kidney                         

  



                          damage for at least three months                      

     



                          as defined by structural or                           



                          functional abnormalities of the                       

    



                          kidney, manifested by                           



                          either:Pathological abnormalities                     

      



                          or Markers of kidney damage                           



                          (including abnormalities in the                       

    



                          composition of the blood or urine                     

      



                          or abnormalities in imaging                           



                          tests).                                

 

             Lab Interpretation (test code = Abnormal                           

    



             67850-3)                                            



[facility] WITH [id number] 19:22:10





             Test Item    Value        Reference Range Interpretation Comments

 

             WBC (test code =              See_Comment                [Automated



             6690-2)                                             message] The sy

stem



                                                                 which generated



                                                                 this result



                                                                 transmitted



                                                                 reference range

:



                                                                 4.20 - 10.70



                                                                 10*3/?L. The



                                                                 reference range

 was



                                                                 not used to



                                                                 interpret this



                                                                 result as



                                                                 normal/abnormal

.

 

             RBC (test code =              See_Comment                [Automated



             789-8)                                              message] The sy

stem



                                                                 which generated



                                                                 this result



                                                                 transmitted



                                                                 reference range

:



                                                                 4.26 - 5.52



                                                                 10*6/?L. The



                                                                 reference range

 was



                                                                 not used to



                                                                 interpret this



                                                                 result as



                                                                 normal/abnormal

.

 

             HGB (test code = 14.4 g/dL    12.2-16.4                 



             718-7)                                              

 

             HCT (test code = 45.3 %       38.4-49.3                 



             4544-3)                                             

 

             MCV (test code = 97.6 fL      81.7-95.6    H            



             787-2)                                              

 

             MCH (test code = 31.0 pg      26.1-32.7                 



             785-6)                                              

 

             MCHC (test code = 31.8 g/dL    31.2-35.0                 



             786-4)                                              

 

             RDW-SD (test code = 46.4 fL      38.5-51.6                 



             95475-4)                                            

 

             RDW-CV (test code = 12.9 %       12.1-15.4                 



             788-0)                                              

 

             PLT (test code =              See_Comment                [Automated



             777-3)                                              message] The sy

stem



                                                                 which generated



                                                                 this result



                                                                 transmitted



                                                                 reference range

:



                                                                 150 - 328 10*3/

?L.



                                                                 The reference r

gwendolyn



                                                                 was not used to



                                                                 interpret this



                                                                 result as



                                                                 normal/abnormal

.

 

             MPV (test code = 11.7 fL      9.8-13.0                  



             13178-9)                                            

 

             NRBC/100 WBC (test              See_Comment                [Automat

ed



             code = 1267133777)                                        message] 

The system



                                                                 which generated



                                                                 this result



                                                                 transmitted



                                                                 reference range

:



                                                                 0.0 - 10.0 /100



                                                                 WBCs. The refer

ence



                                                                 range was not u

sed



                                                                 to interpret th

is



                                                                 result as



                                                                 normal/abnormal

.

 

             NRBC x10^3 (test code <0.01        See_Comment                [Auto

mated



             = [id number])                                        message] The s

ystem



                                                                 which generated



                                                                 this result



                                                                 transmitted



                                                                 reference range

:



                                                                 10*3/?L. The



                                                                 reference range

 was



                                                                 not used to



                                                                 interpret this



                                                                 result as



                                                                 normal/abnormal

.

 

             GRAN MAT (NEUT) % 68.0 %                                 



             (test code = 770-8)                                        

 

             IMM GRAN % (test code 0.50 %                                 



             = [id number])                                        

 

             LYMPH % (test code = 16.9 %                                 



             736-9)                                              

 

             MONO % (test code = 10.5 %                                 



             5905-5)                                             

 

             EOS % (test code = 3.4 %                                  



             713-8)                                              

 

             BASO % (test code = 0.7 %                                  



             706-2)                                              

 

             GRAN MAT x10^3(ANC) 2.98 10*3/uL 1.99-6.95                 



             (test code =                                        



             2037284962)                                         

 

             IMM GRAN x10^3 (test <0.03        0.00-0.06                 



             code = 4833896664)                                        

 

             LYMPH x10^3 (test code 0.74 10*3/uL 1.09-3.23    L            



             = 731-0)                                            

 

             MONO x10^3 (test code 0.46 10*3/uL 0.36-1.02                 



             = 742-7)                                            

 

             EOS x10^3 (test code = 0.15 10*3/uL 0.06-0.53                 



             711-2)                                              

 

             BASO x10^3 (test code 0.03 10*3/uL 0.01-0.09                 



             = 704-7)                                            

 

             Lab Interpretation Abnormal                               



             (test code = 20076-6)                                        



[facility]

## 2022-06-25 NOTE — EDPHYS
Physician Documentation                                                                           

 [facility]                                                                 

Name: Mack Ramírez                                                                                    

Age: 57 yrs                                                                                       

Sex: Male                                                                                         

: 1965                                                                                   

MRN: [id number]                                                                                   

Arrival Date: 2022                                                                          

Time: 08:34                                                                                       

Account#: [id number]                                                                            

Bed Waiting                                                                                       

Private MD:                                                                                       

ED Physician Bethany Fernando                                                                    

HPI:                                                                                              

                                                                                             

08:50 This 57 yrs old Male presents to ER via [facility] with complaints of Medication        pm1 

      Refill, lupus flare.                                                                        

08:50 The patient presents to the emergency department requesting refill(s) for: Prednisone.  pm1 

      The patient chronically suffers from lupus. The patient has experienced similar             

      episodes in the past, This is the patient's 4th ER visit for repeat prescriptions. The      

      patient has not recently seen a physician. Patient is here for medication refill of his     

      steroids for lupus. Patient is requesting prednisone 20 mg PO daily. He currently does      

      not have a PCP, rheumatologist or insurance. Reports that he is currently trying to         

      file for disability.                                                                        

                                                                                                  

Historical:                                                                                       

- Allergies:                                                                                      

08:51 No Known Allergies;                                                                     jl7 

- PMHx:                                                                                           

08:51 COPD; Kidney stones; Lupus; low back pain;                                              jl7 

                                                                                                  

- Immunization history:: Adult Immunizations unknown.                                             

- Social history:: Smoking status: unknown.                                                       

                                                                                                  

                                                                                                  

ROS:                                                                                              

08:51 Constitutional: Negative for fever, chills, and weight loss, Cardiovascular: Negative   pm1 

      for chest pain, palpitations, and edema, Respiratory: Negative for shortness of breath,     

      cough, wheezing, and pleuritic chest pain, Skin: Negative for injury, rash, and             

      discoloration, Neuro: Negative for headache, weakness, numbness, tingling, and seizure.     

08:51 MS/extremity: Positive for joint pain, Negative for injury or acute deformity,              

      decreased range of motion.                                                                  

08:51 All other systems are negative.                                                             

                                                                                                  

Exam:                                                                                             

08:51 Constitutional:  This is a well developed, well nourished patient who is awake, alert,  pm1 

      and in no acute distress.                                                                   

                                                                                                  

Vital Signs:                                                                                      

08:49  / 98; Pulse 64; Resp 15; Temp 99.1; Pulse Ox 99% ;                               jl7 

                                                                                                  

MDM:                                                                                              

08:53 Data reviewed: vital signs. Data interpreted: Pulse oximetry: on room air is 99 %.      pm1 

      Interpretation: normal. Counseling: I had a detailed discussion with the patient and/or     

      guardian regarding: the historical points, exam findings, and any diagnostic results        

      supporting the discharge/admit diagnosis, the need for outpatient follow up, a family       

      practitioner, a rheumatologist, to return to the emergency department if symptoms           

      worsen or persist or if there are any questions or concerns that arise at home.             

08:54 Patient medically screened.                                                             pm1 

                                                                                                  

Administered Medications:                                                                         

No medications were administered                                                                  

                                                                                                  

                                                                                                  

Disposition Summary:                                                                              

22 08:54                                                                                    

Discharge Ordered                                                                                 

      Location: Home                                                                          pm1 

      Problem: new                                                                            pm1 

      Symptoms: have improved                                                                 pm1 

      Condition: Stable                                                                       pm1 

      Diagnosis                                                                                   

        - Encounter for issue of repeat prescription                                          pm1 

      Followup:                                                                               pm1 

        - With: Emergency Department                                                               

        - When: As needed                                                                          

        - Reason: Worsening of condition                                                           

      Followup:                                                                               pm1 

        - With: Private Physician                                                                  

        - When: 2 - 3 days                                                                         

        - Reason: Recheck today's complaints, Continuance of care, Re-evaluation by your           

      physician                                                                                   

      Discharge Instructions:                                                                     

        - Discharge Summary Sheet                                                             pm1 

        - Medicine Refill at the Emergency Department                                         pm1 

      Forms:                                                                                      

        - Medication Reconciliation Form                                                      pm1 

        - Thank You Letter                                                                    pm1 

        - Antibiotic Education                                                                pm1 

        - Prescription Opioid Use                                                             pm1 

      Prescriptions:                                                                              

        - Prednisone 20 mg Oral Tablet                                                             

            - take 1 tablet by ORAL route once daily .; 20 tablet; Refills: 0, Product        pm1 

      Selection Permitted                                                                         

Addendum:                                                                                         

2022                                                                                        

     17:58 Co-signature as Attending Physician, Bethany Fernando MD.                               [email or web address]
[email or web address]

                                                                                                  

Signatures:                                                                                       

Mao Farias NP                    NP   pm1                                                  

Steph Raines, RN                        RN   jl7                                                  

Bethany Fernando MD MD   ma2                                                  

                                                                                                  

**************************************************************************************************

## 2022-06-25 NOTE — ER
Nurse's Notes                                                                                     

 [facility]                                                                 

Name: Mack Ramírez                                                                                    

Age: 57 yrs                                                                                       

Sex: Male                                                                                         

: 1965                                                                                   

MRN: [id number]                                                                                   

Arrival Date: 2022                                                                          

Time: 08:34                                                                                       

Account#: [id number]                                                                            

Bed Waiting                                                                                       

Private MD:                                                                                       

Diagnosis: Encounter for issue of repeat prescription                                             

                                                                                                  

Presentation:                                                                                     

                                                                                             

08:49 Chief complaint: Patient states: Unable to obtain PCP until disability is approved,     jl7 

      needing a refill for prednisone for lupus flare up. Coronavirus screen: At this time,       

      the client does not indicate any symptoms associated with coronavirus-19. Ebola Screen:     

      No symptoms or risks identified at this time. Initial Sepsis Screen: Does the patient       

      meet any 2 criteria? No. Patient's initial sepsis screen is negative. Does the patient      

      have a suspected source of infection? No. Patient's initial sepsis screen is negative.      

      Risk Assessment: Do you want to hurt yourself or someone else? Patient reports no           

      desire to harm self or others. Onset of symptoms is unknown. Care prior to arrival:         

      None.                                                                                       

08:49 Method Of Arrival: Ambulatory                                                           jl7 

08:49 Acuity: EVRN 5                                                                           jl7 

                                                                                                  

Triage Assessment:                                                                                

08:51 General: Appears in no apparent distress. uncomfortable, Behavior is calm, cooperative, jl7 

      appropriate for age. Pain: Complains of pain in all over Pain currently is 10 out of 10     

      on a pain scale.                                                                            

                                                                                                  

Historical:                                                                                       

- Allergies:                                                                                      

08:51 No Known Allergies;                                                                     jl7 

- PMHx:                                                                                           

08:51 COPD; Kidney stones; Lupus; low back pain;                                              jl7 

                                                                                                  

- Immunization history:: Adult Immunizations unknown.                                             

- Social history:: Smoking status: unknown.                                                       

                                                                                                  

                                                                                                  

Screenin:53 Abuse screen: Denies threats or abuse. Denies injuries from another. Nutritional        jl7 

      screening: No deficits noted. Tuberculosis screening: No symptoms or risk factors           

      identified. Fall Risk None identified.                                                      

                                                                                                  

Assessment:                                                                                       

08:53 Reassessment: YANA Cavanaugh in triage assessing pt.                                       jl7 

                                                                                                  

Vital Signs:                                                                                      

08:49  / 98; Pulse 64; Resp 15; Temp 99.1; Pulse Ox 99% ;                               jl7 

                                                                                                  

ED Course:                                                                                        

08:34 Patient arrived in ED.                                                                  as  

08:47 Mao Farias NP is PHCP.                                                           pm1 

08:47 Bethany Fernando MD is Attending Physician.                                           pm1 

08:51 Triage completed.                                                                       jl7 

08:51 Arm band placed on right wrist.                                                         jl7 

08:53 Patient has correct armband on for positive identification.                             jl7 

08:53 No provider procedures requiring assistance completed. Patient did not have IV access   jl7 

      during this emergency room visit.                                                           

                                                                                                  

Administered Medications:                                                                         

No medications were administered                                                                  

                                                                                                  

                                                                                                  

Medication:                                                                                       

08:53 VIS not applicable for this client.                                                     jl7 

                                                                                                  

Outcome:                                                                                          

08:54 Discharge ordered by MD.                                                                pm1 

08:59 Discharged to home ambulatory.                                                          jl7 

08:59 Condition: stable                                                                           

08:59 Discharge instructions given to patient, Instructed on discharge instructions, follow       

      up and referral plans. medication usage, Demonstrated understanding of instructions,        

      follow-up care, medications, Prescriptions given X 1.                                       

08:59 Patient left the ED.                                                                    jl7 

                                                                                                  

Signatures:                                                                                       

Marisa Holder Patrick, NP                    NP   pm1                                                  

Steph Raines, RODDY                        RN   jl7                                                  

                                                                                                  

**************************************************************************************************

## 2022-08-05 ENCOUNTER — HOSPITAL ENCOUNTER (EMERGENCY)
Dept: HOSPITAL 97 - ER | Age: 57
Discharge: HOME | End: 2022-08-05
Payer: SELF-PAY

## 2022-08-05 VITALS — TEMPERATURE: 98.5 F | DIASTOLIC BLOOD PRESSURE: 76 MMHG | SYSTOLIC BLOOD PRESSURE: 121 MMHG | OXYGEN SATURATION: 98 %

## 2022-08-05 DIAGNOSIS — Z91.14: Primary | ICD-10-CM

## 2022-08-05 PROCEDURE — 99283 EMERGENCY DEPT VISIT LOW MDM: CPT

## 2022-08-05 NOTE — ER
Nurse's Notes                                                                                     

 [facility]                                                                 

Name: Mack Ramírez                                                                                    

Age: 57 yrs                                                                                       

Sex: Male                                                                                         

: 1965                                                                                   

MRN: [id number]                                                                                   

Arrival Date: 2022                                                                          

Time: 09:41                                                                                       

Account#: [id number]                                                                            

Bed Waiting                                                                                       

Private MD:                                                                                       

Diagnosis: Patient's other noncompliance with medication regimen                                  

                                                                                                  

Presentation:                                                                                     

                                                                                             

09:45 Chief complaint: Patient states: he has lupus and has been out of his predinsone for    ap3 

      approx one week, and states he feels he is in the beginning of a flare up. patient          

      states he is unable to see his rheumatologist at this time. Coronavirus screen: At this     

      time, the client does not indicate any symptoms associated with coronavirus-19. Ebola       

      Screen: No symptoms or risks identified at this time. Initial Sepsis Screen: Does the       

      patient meet any 2 criteria? No. Patient's initial sepsis screen is negative. Does the      

      patient have a suspected source of infection? No. Patient's initial sepsis screen is        

      negative. Risk Assessment: Do you want to hurt yourself or someone else? Patient            

      reports no desire to harm self or others. Onset of symptoms was 2022.              

09:45 Method Of Arrival: Ambulatory                                                           ap3 

09:45 Acuity: VERN 4                                                                           ap3 

                                                                                                  

Triage Assessment:                                                                                

09:48 General: Appears in no apparent distress. Behavior is calm, cooperative. Pain:          ap3 

      Complains of pain in generalized body aches Pain began years ago. Neuro: Level of           

      Consciousness is awake, alert, obeys commands, Oriented to person, place, time,             

      situation, Speech is normal. Cardiovascular: Patient's skin is warm and dry.                

      Respiratory: Airway is patent Respiratory effort is even, unlabored, Respiratory            

      pattern is regular, symmetrical.                                                            

                                                                                                  

Historical:                                                                                       

- Allergies:                                                                                      

09:47 No Known Allergies;                                                                     ap3 

- Home Meds:                                                                                      

09:47 prednisone 20 mg Oral tab once daily [Active];                                          ap3 

- PMHx:                                                                                           

09:47 COPD; Kidney stones; low back pain; Lupus;                                              ap3 

                                                                                                  

- Immunization history:: Client reports having NOT received the Covid vaccine.                    

- Social history:: Smoking status: Patient denies any tobacco usage or history of.                

                                                                                                  

                                                                                                  

Screenin:48 Abuse screen: Denies threats or abuse. Nutritional screening: No deficits noted.        ap3 

      Tuberculosis screening: No symptoms or risk factors identified.                             

09:49 Fall Risk Fall in past 12 months (25 points). Secondary diagnosis (15 points) impaired  ap3 

      mobility, No IV (0 pts). Ambulatory Aid- Crutches/Cane/Walker (15 pts). Gait-               

      Normal/Bed Rest/Wheelchair (0 pts) Mental Status- Oriented to own ability (0 pts).          

      Total Bruno Fall Scale indicates High Risk Score (45 or more points).                       

                                                                                                  

Vital Signs:                                                                                      

09:45  / 76; Pulse 68; Resp 18; Temp 98.5; Pulse Ox 98% ; Weight 98.43 kg; Height 6 ft. ap3 

      1 in. (185.42 cm);                                                                          

09:45 Body Mass Index 28.63 (98.43 kg, 185.42 cm)                                             ap3 

                                                                                                  

ED Course:                                                                                        

09:41 Patient arrived in ED.                                                                  am2 

09:42 Jeovany Meadows is PHCP.                                                                  jl9 

09:42 Warren Yen MD is Attending Physician.                                              jl9 

09:47 Triage completed.                                                                       ap3 

09:49 Arm band placed on right wrist.                                                         ap3 

09:51 Patient has correct armband on for positive identification. Pulse ox on. NIBP on.       ap3 

09:51 No provider procedures requiring assistance completed. Patient did not have IV access   ap3 

      during this emergency room visit.                                                           

                                                                                                  

Administered Medications:                                                                         

No medications were administered                                                                  

                                                                                                  

                                                                                                  

Medication:                                                                                       

09:49 VIS not applicable for this client.                                                     ap3 

                                                                                                  

Outcome:                                                                                          

09:53 Discharge ordered by MD.                                                                jl9 

09:58 Discharged to home ambulatory.                                                          ap3 

09:58 Condition: good                                                                             

09:58 Discharge instructions given to patient, Instructed on discharge instructions, follow       

      up and referral plans. medication usage, Demonstrated understanding of instructions,        

      follow-up care, medications, Prescriptions given X 1.                                       

09:58 Patient left the ED.                                                                    ap3 

                                                                                                  

Signatures:                                                                                       

Brianne Shelby Amanda, RN                    RN   ap3                                                  

Jeovany Meadows                                jl9                                                  

                                                                                                  

**************************************************************************************************

## 2022-08-05 NOTE — EDPHYS
Physician Documentation                                                                           

 [facility]                                                                 

Name: Mack Ramírez                                                                                    

Age: 57 yrs                                                                                       

Sex: Male                                                                                         

: 1965                                                                                   

MRN: [id number]                                                                                   

Arrival Date: 2022                                                                          

Time: 09:41                                                                                       

Account#: [id number]                                                                            

Bed Waiting                                                                                       

Private MD:                                                                                       

ED Physician Warren Yen                                                                       

HPI:                                                                                              

                                                                                             

09:49 This 57 yrs old  Male presents to ER via Ambulatory with complaints of         jl9 

      Medication Refill.                                                                          

09:49 The patient presents to the emergency department requesting refill(s) for: Prednisone . jl9 

                                                                                                  

Historical:                                                                                       

- Allergies:                                                                                      

09:47 No Known Allergies;                                                                     ap3 

- Home Meds:                                                                                      

09:47 prednisone 20 mg Oral tab once daily [Active];                                          ap3 

- PMHx:                                                                                           

09:47 COPD; Kidney stones; low back pain; Lupus;                                              ap3 

                                                                                                  

- Immunization history:: Client reports having NOT received the Covid vaccine.                    

- Social history:: Smoking status: Patient denies any tobacco usage or history of.                

                                                                                                  

                                                                                                  

ROS:                                                                                              

09:51 Constitutional: Negative for fever, chills, and weight loss, Eyes: Negative for injury, jl9 

      pain, redness, and discharge, ENT: Negative for injury, pain, and discharge, Neck:          

      Negative for injury, pain, and swelling, Cardiovascular: Negative for chest pain,           

      palpitations, and edema, Respiratory: Negative for shortness of breath, cough,              

      wheezing, and pleuritic chest pain, Abdomen/GI: Negative for abdominal pain, nausea,        

      vomiting, diarrhea, and constipation, Back: Negative for injury and pain, MS/Extremity:     

      Negative for injury and deformity, Skin: Negative for injury, rash, and discoloration,      

      Neuro: Negative for headache, weakness, numbness, tingling, and seizure, Psych:             

      Negative for depression, anxiety, suicide ideation, homicidal ideation, and                 

      hallucinations, Allergy/Immunology: Negative for hives, rash, and allergies, Endocrine:     

      Negative for neck swelling, polydipsia, polyuria, polyphagia, and marked weight             

      changes, Hematologic/Lymphatic: Negative for swollen nodes, abnormal bleeding, and          

      unusual bruising.                                                                           

                                                                                                  

Exam:                                                                                             

09:52 Constitutional:  This is a well developed, well nourished patient who is awake, alert,  jl9 

      and in no acute distress. Head/Face:  Normocephalic, atraumatic. Eyes:  Pupils equal        

      round and reactive to light, extra-ocular motions intact.  Lids and lashes normal.          

      Conjunctiva and sclera are non-icteric and not injected.  Cornea within normal limits.      

      Periorbital areas with no swelling, redness, or edema. ENT:   Mucous membranes moist.       

      Neck:  Trachea midline, no thyromegaly or masses palpated, and no cervical                  

      lymphadenopathy.  Supple, full range of motion without nuchal rigidity, or vertebral        

      point tenderness.  No Meningismus. Chest/axilla:  Normal chest wall appearance and          

      motion.  Nontender with no deformity.  No lesions are appreciated. Cardiovascular:          

      Regular rate and rhythm with a normal S1 and S2.  No gallops, murmurs, or rubs.  Normal     

      PMI, no JVD.  No pulse deficits. Respiratory:  Lungs have equal breath sounds               

      bilaterally, clear to auscultation and percussion.  No rales, rhonchi or wheezes noted.     

       No increased work of breathing, no retractions or nasal flaring. Abdomen/GI:  Soft,        

      non-tender, with normal bowel sounds.  No distension or tympany.  No guarding or            

      rebound.  No evidence of tenderness throughout. Back:  No spinal tenderness.  No            

      costovertebral tenderness.  Full range of motion. Skin:  Warm, dry with normal turgor.      

      Normal color with no rashes, no lesions, and no evidence of cellulitis. MS/ Extremity:      

      Pulses equal, no cyanosis.  Neurovascular intact.  Full, normal range of motion. Neuro:     

       Awake and alert, GCS 15, oriented to person, place, time, and situation.  Cranial          

      nerves II-XII grossly intact.  Motor strength 5/5 in all extremities.  Sensory grossly      

      intact.  Cerebellar exam normal.  Normal gait. Psych:  Awake, alert, with orientation       

      to person, place and time.  Behavior, mood, and affect are within normal limits.            

                                                                                                  

Vital Signs:                                                                                      

09:45  / 76; Pulse 68; Resp 18; Temp 98.5; Pulse Ox 98% ; Weight 98.43 kg; Height 6 ft. ap3 

      1 in. (185.42 cm);                                                                          

09:45 Body Mass Index 28.63 (98.43 kg, 185.42 cm)                                             ap3 

                                                                                                  

MDM:                                                                                              

09:52 Data reviewed: vital signs, nurses notes.                                               jl9 

09:53 Patient medically screened.                                                             jl9 

                                                                                                  

Administered Medications:                                                                         

No medications were administered                                                                  

                                                                                                  

                                                                                                  

Disposition:                                                                                      

10:35 Co-signature as Attending Physician, Warren Yen MD I agree with the assessment and   kdr 

      plan of care.                                                                               

                                                                                                  

Disposition Summary:                                                                              

22 09:53                                                                                    

Discharge Ordered                                                                                 

      Location: Home                                                                          jl9 

      Condition: Stable                                                                       jl9 

      Diagnosis                                                                                   

        - Patient's other noncompliance with medication regimen                               jl9 

      Followup:                                                                               jl9 

        - With: Private Physician                                                                  

        - When: As needed                                                                          

        - Reason: Recheck today's complaints, Continuance of care, Re-evaluation by your           

      physician                                                                                   

      Discharge Instructions:                                                                     

        - Discharge Summary Sheet                                                             jl9 

        - Medicine Refill at the Emergency Department                                         jl9 

      Forms:                                                                                      

        - Medication Reconciliation Form                                                      jl9 

        - Thank You Letter                                                                    jl9 

        - Antibiotic Education                                                                jl9 

        - Prescription Opioid Use                                                             jl9 

      Prescriptions:                                                                              

        - Prednisone 20 mg Oral Tablet                                                             

            - take 1 tablet by ORAL route once daily for 1 month; 30 tablet; Refills: 0,      jl9 

      Product Selection Permitted                                                                 

Signatures:                                                                                       

Warren Yen MD MD kdr Prokisch, Amanda, RN                    RN   Jeovany Morrison                                jl9                                                  

                                                                                                  

**************************************************************************************************

## 2022-08-05 NOTE — XMS REPORT
Continuity of Care Document

                            Created on:2022



Patient:REMY ROACH CHETAN

Sex:Male

:1965

External Reference #:[id number]





Demographics







                          Address                   [address]



                                                    [place] [address]

 

                          Home Phone                [phone]

 

                          Mobile Phone              [phone]

 

                          Email Address             [email or web address]

 

                          Preferred Language        English

 

                          Marital Status            Unknown

 

                          Scientology Affiliation     Unknown

 

                          Race                      Unknown

 

                          Additional Race(s)        Unavailable

 

                          Ethnic Group              Unknown









Author







                          Organization              [facility]

t

 

                          Address                   [address]



                                                    [place] [address]

 

                          Phone                     [phone]









Support







                Name            Relationship    Address         Phone

 

                TI ROACH     X               [address] [phone]

[phone]



                                                [place] [address] 









Care Team Providers







                    Name                Role                Phone

 

                    PCP, PATIENT DOES NOT HAVE A Primary Care Physician UnavailCELINA Yañez     Attending Clinician Unavailable

 

                    Celina Lan DO  Attending Clinician [phone]

 

                    Doctor Unassigned, No Name Attending Clinician Unavailable

 

                    Annemarie Hannah NP Attending Clinician [phone]

 

                    Chiquis Sanchez RN    Attending Clinician Unavailable

 

                    [facility] Attending Clinician Unavailable

 

                    Ness Sol  Attending Clinician [phone]

 

                    CELINA LAN     Admitting Clinician Unavailable









Payers







           Payer Name Policy Type Policy Number Effective Date Expiration Date S

ource







Problems







       Condition Condition Condition Status Onset  Resolution Last   Treating Co

mments 

Source



       Name   Details Category        Date   Date   Treatment Clinician        



                                                 Date                 

 

       No known No known Disease                                           Un[facility]

rs



       active active                                                  [facility]



       problems problems                                                  [facility]







Allergies, Adverse Reactions, Alerts







       Allergy Allergy Status Severity Reaction(s) Onset  Inactive Treating Comm

ents 

Source



       Name   Type                        Date   Date   Clinician        

 

       NO KNOWN Drug   Active                                           [facility]



       ALLERGIE Class                                                   [facility] of



       S                                                              [facility]







Social History







           Social Habit Start Date Stop Date  Quantity   Comments   Source

 

           Exposure to                       Not sure              [facility]



           SARS-CoV-2                                             [facility]



           (event)                                                Branch

 

           Alcohol intake 2021 Current               [facility]

 of



                      00:00:00   00:00:00   non-drinker of            [place] [facility]

[place]



                                            alcohol               Branch



                                            (finding)             

 

           Tobacco use and 2018 Never used            [facility]

y of



           exposure   00:00:00   00:00:00                         [facility]

 

           Sex Assigned At 1965                       [facility]

y of



           Birth      00:00:00   00:00:00                         [facility]









                Smoking Status  Start Date      Stop Date       Source

 

                Current every day smoker 2018 00:00:00                 Uni

versity of [facility]







Medications







       Ordered Filled Start  Stop   Current Ordering Indication Dosage Frequency

 Signature

                    Comments            Components          Source



     Medication Medication Date Date Medication? Clinician                (SIG) 

          



     Name Name                                                   

 

     naproxen            Yes       [id number] 550mg      Take 1           

[facility]



     sodium      2-23                               tablet by           ity of



     (ANAPROX      00:00:                               mouth 2           [place]



     DS) 550 mg      00                                 (two)           Medical



     tablet                                         times           Branch



                                                  daily with           



                                                  meals.           

 

     predniSONE            Yes       [id number]           Take bid          

 [facility]



     10 mg      2-02                               for 1 week           ity of



     tablet      00:00:                               then QD           [place]



               00                                 thereafter           Medical



                                                  for lupus           Branch

 

     predniSONE            Yes       [id number]           Take bid          

 [facility]



     10 mg      2-02                               for 1 week           ity of



     tablet      00:00:                               then QD           [place]



               00                                 thereafter           Medical



                                                  for lupus           Branch

 

     predniSONE            Yes       [id number]           Take bid          

 [facility]



     10 mg      2-02                               for 1 week           ity of



     tablet      00:00:                               then QD           [place]



               00                                 thereafter           Medical



                                                  for lupus           Branch

 

     predniSONE      -2022- No        [id number]           Take bid         

  [facility]



     10 mg      2-02 02-02                          for 1 week           ity of



     tablet      00:00: 00:00                          then QD           [place]



               00   :00                           thereafter           Medical



                                                  for lupus           Branch

 

     predniSONE      -2021- No        [id number]           Take 1          

 [facility]



     5 mg tablet                                tablet by           it

y of



               00:00: 04:59                          mouth           [place]



               00   :00                           daily for           Medical



                                                  7 days,           Branch



                                                  THEN 0.5           



                                                  tablets           



                                                  daily for           



                                                  7 days.           

 

     predniSONE      -2021- No        [id number]           Take 1          

 [facility]



     5 mg tablet                                tablet by           it

y of



               00:00: 04:59                          mouth           [place]



               00   :00                           daily for           Medical



                                                  7 days,           Branch



                                                  THEN 0.5           



                                                  tablets           



                                                  daily for           



                                                  7 days.           

 

     predniSONE      -2021- No             20mg      Take 20 mg           

[facility]



     (DELTASONE)      8-25 08-25                          by mouth           ity

 of



     20 mg      21:01: 00:00                          daily.           [place]



     tablet      23   :00                                          Medical



                                                                 Branch

 

     predniSONE      -2021- No             20mg      Take 20 mg           

[facility]



     (DELTASONE)      8-25 08-25                          by mouth           ity

 of



     20 mg      21:01: 00:00                          daily.           [place]



     tablet      23   :00                                          Medical



                                                                 Branch

 

     predniSONE      2021- No        [id number]           Take 0.5        

   [facility]



     20 mg      8-25 -09                          tablets by           ity of



     tablet      00:00: 04:59                          mouth 2           [place]



               00   :00                           (two)           Medical



                                                  times           [facility]



                                                  daily for           



                                                  7 days,           



                                                  THEN 0.5           



                                                  tablets           



                                                  daily for           



                                                  7 days.           

 

     predniSONE      2021- No        [id number]           Take 0.5        

   [facility]



     20 mg      8-25 -09                          tablets by           ity of



     tablet      00:00: 04:59                          mouth 2           [place]



               00   :00                           (two)           [facility]



                                                  daily for           



                                                  7 days,           



                                                  THEN 0.5           



                                                  tablets           



                                                  daily for           



                                                  7 days.           

 

     methylPREDN      2018-      Yes            84mg      Take 21           Uni

vers



     ISolone      5-07                               tablets by           ity of



     (MEDROL,      00:00:                               mouth           [place]



     TEGAN,) 4 mg      00                                 SEE-INSTRU           Med

ical



     tablets                                         CTIONS.           Branch



                                                  follow           



                                                  package           



                                                  directions           

 

     methylPREDN      -0      Yes            84mg      Take 21           Uni

vers



     ISolone      5-07                               tablets by           ity of



     (MEDROL,      00:00:                               mouth           [place]



     TEGAN,) 4 mg      00                                 SEE-INSTRU           Med

ical



     tablets                                         CTIONS.           Branch



                                                  follow           



                                                  package           



                                                  directions           

 

     methylPREDN      -0      Yes            84mg      Take 21           Uni

vers



     ISolone      5-07                               tablets by           ity of



     (MEDROL,      00:00:                               mouth           [place]



     TEGAN,) 4 mg      00                                 SEE-INSTRU           Med

ical



     tablets                                         CTIONS.           Branch



                                                  follow           



                                                  package           



                                                  directions           

 

     methylPREDN      2018-0      Yes            84mg      Take 21           Uni

vers



     ISolone      5-07                               tablets by           ity of



     (MEDROL,      00:00:                               mouth           [place]



     TEGAN,) 4 mg      00                                 SEE-INSTRU           Med

ical



     tablets                                         CTIONS.           Branch



                                                  follow           



                                                  package           



                                                  directions           

 

     methylPREDN      -0 - No             84mg      Take 21           Un

radha



     ISolone      5-07 08-25                          tablets by           ity o

f



     (MEDROL,      00:00: 00:00                          mouth           [place]



     TEGAN,) 4 mg      00   :00                           SEE-INSTRU           Med

ical



     tablets                                         CTIONS.           Branch



                                                  follow           



                                                  package           



                                                  directions           

 

     methylPREDN      -0 - No             84mg      Take 21           Un

radha



     ISolone      5-07 08-25                          tablets by           ity o

f



     (MEDROL,      00:00: 00:00                          mouth           [place]



     TEGAN,) 4 mg      00   :00                           SEE-INSTRU           Med

ical



     tablets                                         CTIONS.           Branch



                                                  follow           



                                                  package           



                                                  directions           

 

     predniSONE      2018-0      Yes            20mg      Take 20 mg           U

nivers



     (DELTASONE)      4-12                               by mouth           ity 

of



     20 mg      14:00:                               daily.           [place]



     tablet      56                                                Medical



                                                                 Branch

 

     predniSONE      2018-0      Yes            20mg      Take 20 mg           U

nivers



     (DELTASONE)      4-12                               by mouth           ity 

of



     20 mg      14:00:                               daily.           [place]



     tablet      56                                                Medical



                                                                 Branch

 

     predniSONE      2018-0      Yes            20mg      Take 20 mg           U

nivers



     (DELTASONE)      4-12                               by mouth           ity 

of



     20 mg      14:00:                               daily.           [place]



     tablet      56                                                Medical



                                                                 Branch

 

     predniSONE      2018-0      Yes            20mg      Take 20 mg           U

nivers



     (DELTASONE)      4-12                               by mouth           ity 

of



     20 mg      14:00:                               daily.           [place]



     tablet      56                                                Medical



                                                                 Branch

 

     traMADOL 50      2018-0      Yes            50mg      Take 1           Univ

ers



     mg tablet      2-19                               tablet by           ity o

f



               00:00:                               mouth           [place]



               00                                 every 4           Medical



                                                  (four)           Branch



                                                  hours as           



                                                  needed for           



                                                  Pain           



                                                  (scale           



                                                  4-6) or           



                                                  Pain           



                                                  unrelieved           



                                                  by             



                                                  non-narcot           



                                                  ic             



                                                  analgesics           



                                                  .              

 

     traMADOL 50      2018-0      Yes            50mg      Take 1           Univ

ers



     mg tablet      2-19                               tablet by           ity o

f



               00:00:                               mouth           [place]



               00                                 every 4           Medical



                                                  (four)           Branch



                                                  hours as           



                                                  needed for           



                                                  Pain           



                                                  (scale           



                                                  4-6) or           



                                                  Pain           



                                                  unrelieved           



                                                  by             



                                                  non-narcot           



                                                  ic             



                                                  analgesics           



                                                  .              

 

     traMADOL 50      2018-0      Yes            50mg      Take 1           Univ

ers



     mg tablet      2-19                               tablet by           ity o

f



               00:00:                               mouth           [place]



               00                                 every 4           Medical



                                                  (four)           Branch



                                                  hours as           



                                                  needed for           



                                                  Pain           



                                                  (scale           



                                                  4-6) or           



                                                  Pain           



                                                  unrelieved           



                                                  by             



                                                  non-narcot           



                                                  ic             



                                                  analgesics           



                                                  .              

 

     traMADOL 50      2018-0      Yes            50mg      Take 1           Univ

ers



     mg tablet      2-19                               tablet by           ity o

f



               00:00:                               mouth           [place]



               00                                 every 4           Medical



                                                  (four)           Branch



                                                  hours as           



                                                  needed for           



                                                  Pain           



                                                  (scale           



                                                  4-6) or           



                                                  Pain           



                                                  unrelieved           



                                                  by             



                                                  non-narcot           



                                                  ic             



                                                  analgesics           



                                                  .              

 

     traMADOL 50      -0 - No             50mg      Take 1           Uni

vers



     mg tablet      2-19 08-25                          tablet by           ity 

of



               00:00: 00:00                          mouth           [place]



               00   :00                           every 4           Medical



                                                  (four)           Branch



                                                  hours as           



                                                  needed for           



                                                  Pain           



                                                  (scale           



                                                  4-6) or           



                                                  Pain           



                                                  unrelieved           



                                                  by             



                                                  non-narcot           



                                                  ic             



                                                  analgesics           



                                                  .              

 

     traMADOL 50      2018-0 - No             50mg      Take 1           Uni

vers



     mg tablet      2-19 08-25                          tablet by           ity 

of



               00:00: 00:00                          mouth           [place]



               00   :00                           every 4           Medical



                                                  (four)           Branch



                                                  hours as           



                                                  needed for           



                                                  Pain           



                                                  (scale           



                                                  4-6) or           



                                                  Pain           



                                                  unrelieved           



                                                  by             



                                                  non-narcot           



                                                  ic             



                                                  analgesics           



                                                  .              

 

     acetaminoph            Yes                      1-2 by           [facility]



     en-codeine                                     mouth           ity of



     (TYLENOL-CO      00:00:                               every 4-6           [place]

[place]



     DEINE #3)      00                                 hours as           Medica

l



     300-30 mg                                         needed prn           Bran

ch



     tablet                                         pain           

 

     acetaminoph            Yes                      1-2 by           [facility]



     en-codeine                                     mouth           ity of



     (TYLENOL-CO      00:00:                               every 4-6           T

[place]



     DEINE #3)      00                                 hours as           Medica

l



     300-30 mg                                         needed prn           Bran

ch



     tablet                                         pain           

 

     acetaminoph            Yes                      1-2 by           [facility]



     en-codeine                                     mouth           ity of



     (TYLENOL-CO      00:00:                               every 4-6           T

[place]



     DEINE #3)      00                                 hours as           Medica

l



     300-30 mg                                         needed prn           Bran

ch



     tablet                                         pain           

 

     acetaminoph            Yes                      1-2 by           [facility]



     en-codeine                                     mouth           ity of



     (TYLENOL-CO      00:00:                               every 4-6           T

[place]



     DEINE #3)      00                                 hours as           Medica

l



     300-30 mg                                         needed prn           Bran

ch



     tablet                                         pain           

 

     acetaminoph      2021- No                       1-2 by           [facility] [place] [facility]



     en-codeine       08-25                          mouth           ity of



     (TYLENOL-CO      00:00: 00:00                          every 4-6           

[place]



     DEINE #3)      00   :00                           hours as           Medica

l



     300-30 mg                                         needed prn           Bran

ch



     tablet                                         pain           

 

     acetaminoph      2021- No                       1-2 by           [facility] [place] [facility]



     en-codeine      2- 08-25                          mouth           ity of



     (TYLENOL-CO      00:00: 00:00                          every 4-6           

[place]



     DEINE #3)      00   :00                           hours as           Medica

l



     300-30 mg                                         needed prn           Bran

ch



     tablet                                         pain           

 

     docusate            Yes            100mg      Take 1           Univer

s



     (COLACE)      4-20                               capsule by           ity o

f



     100 mg      00:00:                               mouth 3           [place]



     capsule      00                                 (three)           Medical



                                                  times           Branch



                                                  daily.           

 

     Polyethylen            Yes            1{packe      Take 1           U

nivers



     e Glycol      4-20                     t}        Packet by           ity of



     3350      00:00:                               mouth 2           [place]



     (MIRALAX)      00                                 (two)           Medical



     17 gram                                         times           Branch



     powder                                         daily.           

 

     docusate            Yes            100mg      Take 1           Univer

s



     (COLACE)      4-20                               capsule by           ity o

f



     100 mg      00:00:                               mouth 3           [place]



     capsule      00                                 (three)           Medical



                                                  times           Branch



                                                  daily.           

 

     Polyethylen            Yes            1{packe      Take 1           U

nivers



     e Glycol      4-20                     t}        Packet by           ity of



     3350      00:00:                               mouth 2           [place]



     (MIRALAX)      00                                 (two)           Medical



     17 gram                                         times           Branch



     powder                                         daily.           

 

     docusate            Yes            100mg      Take 1           Univer

s



     (COLACE)      4-20                               capsule by           ity o

f



     100 mg      00:00:                               mouth 3           [place]



     capsule      00                                 (three)           Medical



                                                  times           Branch



                                                  daily.           

 

     docusate            Yes            100mg      Take 1           Univer

s



     (COLACE)      4-20                               capsule by           ity o

f



     100 mg      00:00:                               mouth 3           [place]



     capsule      00                                 (three)           Medical



                                                  times           Branch



                                                  daily.           

 

     Polyethylen            Yes            1{packe      Take 1           U

nivers



     e Glycol      4-20                     t}        Packet by           ity of



     3350      00:00:                               mouth 2           [place]



     (MIRALAX)      00                                 (two)           Medical



     17 gram                                         times           Branch



     powder                                         daily.           

 

     Polyethylen            Yes            1{packe      Take 1           U

nivers



     e Glycol      4-20                     t}        Packet by           ity of



     3350      00:00:                               mouth 2           [place]



     (MIRALAX)      00                                 (two)           Medical



     17 gram                                         times           Branch



     powder                                         daily.           

 

     docusate      2021- No             100mg      Take 1           Unive

rs



     (COLACE)      4-20 08-25                          capsule by           ity 

of



     100 mg      00:00: 00:00                          mouth 3           [place]



     capsule      00   :00                           (three)           Medical



                                                  times           Branch



                                                  daily.           

 

     Polyethylen      2021- No             1{packe      Take 1           

[facility]



     e Glycol      4-20 08-25                t}        Packet by           ity o

f



     3350      00:00: 00:00                          mouth 2           [place]



     (MIRALAX)      00   :00                           (two)           Medical



     17 gram                                         times           Branch



     powder                                         daily.           

 

     docusate      2021- No             100mg      Take 1           Unive

rs



     (COLACE)      4-20 08-25                          capsule by           ity 

of



     100 mg      00:00: 00:00                          mouth 3           [place]



     capsule      00   :00                           (three)           Medical



                                                  times           Branch



                                                  daily.           

 

     Polyethylen      2021- No             1{packe      Take 1           

Univers



     e Glycol      4-20 08-25                t}        Packet by           martínez mcdaniel

f



     8436      00:00: 00:00                          mouth 2           [place]



     (MIRALAX)      00   :00                           (two)           Medical



     17 gram                                         times           Branch



     powder                                         daily.           







Vital Signs







             Vital Name   Observation Time Observation Value Comments     Source

 

             Systolic blood 2022 18:52:58 140 mm[Hg]                Univer

sity of



             pressure                                            [place] [facility]



                                                                 Branch

 

             Diastolic blood 2022 18:52:58 82 mm[Hg]                 Unive

rsity of



             pressure                                            [place] [facility]



                                                                 Branch

 

             Heart rate   2022 18:52:58 88 /min                   Universi

ty of



                                                                 [place] [facility]



                                                                 Branch

 

             Respiratory rate 2022 18:52:58 18 /min                   Univ

ersity of



                                                                 [facility]



                                                                 Branch

 

             Oxygen saturation in 2022 18:52:58 99 /min                   

[facility]



             Arterial blood by                                        [place] [facility]

sharona



             Pulse oximetry                                        Branch

 

             Body temperature 2022 16:31:00 36.44 Ameena                 Univ

ersity of



                                                                 [place] [facility]



                                                                 Branch

 

             Body weight  2022 16:30:00 90.719 kg                 Universi

ty of



                                                                 [place] [facility]



                                                                 Branch

 

             BMI          2022 16:30:00 26.39 kg/m2               Universi

ty of



                                                                 [place] [facility]



                                                                 Branch

 

             Systolic blood 2022 18:06:00 143 mm[Hg]                Univer

sity of



             pressure                                            [place] [facility]



                                                                 Branch

 

             Diastolic blood 2022 18:06:00 91 mm[Hg]                 Unive

rsity of



             pressure                                            [place] [facility]



                                                                 Branch

 

             Heart rate   2022 18:04:00 60 /min                   Universi

ty of



                                                                 [place] [facility]



                                                                 Branch

 

             Body temperature 2022 18:04:00 37.06 Ameena                 Univ

ersity of



                                                                 [place] [facility]



                                                                 Branch

 

             Respiratory rate 2022 18:04:00 18 /min                   Univ

ersity of



                                                                 [place] [facility]



                                                                 Branch

 

             Body weight  2022 18:04:00 90.719 kg                 Universi

ty of



                                                                 [place] [facility]



                                                                 Branch

 

             BMI          2022 18:04:00 26.39 kg/m2               Universi

ty of



                                                                 [place] [facility]



                                                                 Branch

 

             Oxygen saturation in 2022 18:04:00 96 /min                   

[facility]



             Arterial blood by                                        [place] [facility]

sharona



             Pulse oximetry                                        Branch

 

             Systolic blood 2021 20:25:00 149 mm[Hg]                Univer

sity of



             pressure                                            [place] [facility]



                                                                 Branch

 

             Diastolic blood 2021 20:25:00 110 mm[Hg]                Unive

rsity of



             pressure                                            [facility]

 

             Heart rate   2021 20:25:00 75 /min                   Universi

ty of



                                                                 [facility]

 

             Body temperature 2021 20:25:00 36.33 Ameena                 Univ

ersity of



                                                                 [facility]

 

             Respiratory rate 2021 20:25:00 18 /min                   Univ

ersity of



                                                                 [facility]

 

             Body weight  2021 20:25:00 90.719 kg                 Universi

ty of



                                                                 [place] [facility]



                                                                 [facility]

 

             BMI          2021 20:25:00 26.39 kg/m2               Universi

ty of



                                                                 [facility]

 

             Oxygen saturation in 2021 20:25:00 95 /min                   

[facility]



             Arterial blood by                                        [place] [facility]

sh[facility]ona



             Pulse oximetry                                        Branch

 

             Systolic blood 2020 15:29:00 156 mm[Hg]                Univer

sity of



             pressure                                            [facility]

 

             Diastolic blood 2020 15:29:00 97 mm[Hg]                 Unive

rsity of



             pressure                                            [facility]

 

             Heart rate   2020 15:29:00 66 /min                   Universi

ty of



                                                                 [facility]

 

             Body temperature 2020 15:22:00 36.67 Ameena                 Univ

ersity of



                                                                 [facility]

 

             Respiratory rate 2020 15:22:00 18 /min                   [facility]



                                                                 [facility]

 

             Body height  2020 15:22:00 185.4 cm                  Universi

ty of



                                                                 [place] [facility]



                                                                 [facility]

 

             Body weight  2020 15:22:00 90.719 kg                 Universi

ty of



                                                                 [place] [facility]



                                                                 [facility]

 

             BMI          2020 15:22:00 26.39 kg/m2               Universi

ty [facility]

 

             Oxygen saturation in 2020 15:22:00 95 /min                   

[facility]



             Arterial blood by                                        [place] [facility]

[facility]ona



             Pulse oximetry                                        Branch







Procedures







                Procedure       Date / Time Performed Performing Clinician Sourc

e

 

                XR KNEE 3 VW LEFT 2022 16:49:35 Celina Lan [facility]

 

                CONSENT/REFUSAL FOR 2022 16:22:18 Doctor Unassigned, No Un

[facility]



                DIAGNOSIS AND                   Name            Medical Branch



                TREATMENT                                       

 

                COMP. METABOLIC PANEL 2022 19:06:00 Celina Lan

sitheidi [facility]



                ([id number])                                         [facility]

 

                CBC WITH DIFF   2022 19:06:00 Celina Lan o

f [facility]

 

                NOTICE OF PRIVACY 2022 17:29:34 Doctor Unassigned, No Univ

[facility] [place]



                PRACTICES                       Name            [facility]

 

                CONSENT/REFUSAL FOR 2022 17:29:14 Doctor Unassigned, No Un

iversity of 

[place]



                DIAGNOSIS AND                   Name            [facility]



                TREATMENT                                       

 

                CONSENT/REFUSAL FOR 2021 20:11:27 Doctor Unassigned, No Un

iversity of 

[place]



                DIAGNOSIS AND                   Name            [facility]



                TREATMENT                                       







Encounters







        Start   End     Encounter Admission Attending Care    Care    Encounter 

Source



        Date/Time Date/Time Type    Type    Clinicians Facility Department ID   

   

 

        2021         Emergency                 [facility]    [id number] 

Univers



        18:06:45                                                         ity [facility]

 

        2022 [facility]     [id number]

43 Univers



        10:34:00 12:59:00                 CELINA soliz [facility]

 

        2022 [facility]         [facility]    [id number]

[id number] Univers



        10:34:00 12:59:00                 Celina HARPER [id number]         i

ty of



                                                [place] [id number]         [facility]  [phone]         [address]

 

        2022 Orders          Doctor CHAPMAN    [id number]

96 Univers



        00:00:00 00:00:00 Only            Unassigned, DORIS   [id number]       

  ity of



                                        [facility] [id number]         [place]

as



                                                        [phone]         [address]

 

        2022 [facility]     [id number]

58 Univers



        12:06:00 13:47:00                 CELINA soliz [facility]

 

        2022 [facility]         [facility]    [id number]

4684 Univers



        12:06:00 13:47:00                 Celina HARPER [id number]         i

ty of



                                                [place] [id number]         [facility]  [phone]         [address]

 

        2021 Emergency         [facility]    [id number]

0722 Univers



        15:27:00 16:34:00                 Annemarie Harper [id number]         

ity of



                                                [place] [id number]         [place]

[facility]  [phone]         Medi

sharona



                                                        [address]             [facility]

 

        2021 Orders          Doctor  ADELA    [id number]

83 Univers



        00:00:00 00:00:00 Only            Unassigned, DORIS   [id number]       

  ity of



                                        [facility] [id number]         [place]

as



                                                        [phone]         Medi

sharona



                                                        [address]             [facility]

 

        2020 Telephone         ADELA Sanchez    [id number]

6715 Univers



        00:00:00 00:00:00                 Chiquis GEORGE   [id number]         it

y of



                                                HOSPITAL [id number]         [place]

as



                                                        [phone]         Medi

sharona



                                                        [address]             [facility]

 

        2020 Urgent          [facility]    [id number]

[id number] 

Univers



        10:06:34 10:26:34 Care            Ness Wooten [facility]  [id number]    

     ity of



                                                [place] [id number]         [place]

as



                                                [place] [phone]         Me

dical



                                                [facility]     [address]             [facility]



                                                Office                  



                                                Building                 



                                                One                     

 

        2020 Outpatient R               [facility]    [id number]

[id number] Univers



        10:00:00 10:00:00                                                 ity of



                                                                        [facility]







Results







           Test Description Test Time  Test Comments Results    Result Comments 

Source









                    COMP. METABOLIC PANEL (48011) 2022 19:24:53 









                      Test Item  Value      Reference Range Interpretation Comme

nts









             NA (test code = 4665648547) 139 mmol/L   135-145                   

 

             K (test code = 0023443025) 4.3 mmol/L   3.5-5.0                   

 

             CL (test code = 6369216539) 102 mmol/L                       

 

             CO2 TOTAL (test code = 9109339232) 32 mmol/L    23-31        H     

       

 

             AGAP (test code = 0920275573)              2-16                    

  

 

             BUN (test code = 1044415607) 3 mg/dL      7-23         L           

 

 

             GLUCOSE (test code = 2305624390) 90 mg/dL                    

     

 

             CREATININE (test code = 0.59 mg/dL   0.60-1.25    L            



             [id number])                                         

 

             TOTAL BILI (test code = 0.6 mg/dL    0.1-1.1                   



             5064037546)                                         

 

             CALCIUM (test code = 1276320303) 8.1 mg/dL    8.6-10.6     L       

     

 

             T PROTEIN (test code = 5514683979) 7.3 g/dL     6.3-8.2            

       

 

             ALBUMIN (test code = 8956974956) 3.8 g/dL     3.5-5.0              

     

 

             ALK PHOS (test code = 5645409931) 88 U/L                     

      

 

             ALTv (test code = 1742-6) 10 U/L       5-50                      

 

             AST(SGOT) (test code = 2384302423) 26 U/L       13-40              

       

 

             eGFR (test code = 5304779999)              mL/min/1.73m2           

   

 

             LISANDRA (test code = LISANDRA) Association of Glomerular                    

       



                          Filtration Rate (GFR) and Staging                     

      



                          of Kidney Disease*                           



                          +-----------------------+--------                     

      



                          -------------+-------------------                     

      



                          ------+| GFR (mL/min/1.73 m2) ?|                      

     



                          With Kidney Damage ?| ?Without                        

   



                          Kidney                                 



                          Damage+-----------------------+--                     

      



                          -------------------+-------------                     

      



                          ------------+| ?>90 ? ? ? ? ? ? ?                     

      



                          ? ?| ?Stage one ? ? ? ? ?| ?                          

 



                          Normal ? ? ? ? ? ? ?                           



                          ?+-----------------------+-------                     

      



                          --------------+------------------                     

      



                          -------+| ?60-89 ? ? ? ? ? ? ? ?|                     

      



                          ?Stage two ? ? ? ? ?| ? Decreased                     

      



                          GFR ? ? ? ?                            



                          +-----------------------+--------                     

      



                          -------------+-------------------                     

      



                          ------+| ?30-59 ? ? ? ? ? ? ? ?|                      

     



                          ?Stage three ? ? ? ?| ? Stage                         

  



                          three ? ? ? ? ?                           



                          +-----------------------+--------                     

      



                          -------------+-------------------                     

      



                          ------+| ?15-29 ? ? ? ? ? ? ? ?|                      

     



                          ?Stage four ? ? ? ? | ? Stage                         

  



                          four ? ? ? ? ?                           



                          ?+-----------------------+-------                     

      



                          --------------+------------------                     

      



                          -------+| ?<15 (or dialysis) ? ?|                     

      



                          ?Stage five ? ? ? ? | ? Stage                         

  



                          five ? ? ? ? ?                           



                          ?+-----------------------+-------                     

      



                          --------------+------------------                     

      



                          -------+ *Each stage assumes the                      

     



                          associated GFR level has been in                      

     



                          effect for at least three months.                     

      



                          ?Stages 1 to 5, with or without                       

    



                          kidney disease, indicate chronic                      

     



                          kidney disease. Notes:                           



                          Determination of stages one and                       

    



                          two (with eGFR >59mL/min/1.73 m2)                     

      



                          requires estimation of kidney                         

  



                          damage for at least three months                      

     



                          as defined by structural or                           



                          functional abnormalities of the                       

    



                          kidney, manifested by                           



                          either:Pathological abnormalities                     

      



                          or Markers of kidney damage                           



                          (including abnormalities in the                       

    



                          composition of the blood or urine                     

      



                          or abnormalities in imaging                           



                          tests).                                

 

             Lab Interpretation (test code = Abnormal                           

    



             84664-8)                                            



[facility] WITH [id number] 19:22:10





             Test Item    Value        Reference Range Interpretation Comments

 

             WBC (test code =              See_Comment                [Automated



             6690-2)                                             message] The sy

stem



                                                                 which generated



                                                                 this result



                                                                 transmitted



                                                                 reference range

:



                                                                 4.20 - 10.70



                                                                 10*3/?L. The



                                                                 reference range

 was



                                                                 not used to



                                                                 interpret this



                                                                 result as



                                                                 normal/abnormal

.

 

             RBC (test code =              See_Comment                [Automated



             789-8)                                              message] The sy

stem



                                                                 which generated



                                                                 this result



                                                                 transmitted



                                                                 reference range

:



                                                                 4.26 - 5.52



                                                                 10*6/?L. The



                                                                 reference range

 was



                                                                 not used to



                                                                 interpret this



                                                                 result as



                                                                 normal/abnormal

.

 

             HGB (test code = 14.4 g/dL    12.2-16.4                 



             718-7)                                              

 

             HCT (test code = 45.3 %       38.4-49.3                 



             4544-3)                                             

 

             MCV (test code = 97.6 fL      81.7-95.6    H            



             787-2)                                              

 

             MCH (test code = 31.0 pg      26.1-32.7                 



             785-6)                                              

 

             MCHC (test code = 31.8 g/dL    31.2-35.0                 



             786-4)                                              

 

             RDW-SD (test code = 46.4 fL      38.5-51.6                 



             04278-1)                                            

 

             RDW-CV (test code = 12.9 %       12.1-15.4                 



             788-0)                                              

 

             PLT (test code =              See_Comment                [Automated



             777-3)                                              message] The sy

stem



                                                                 which generated



                                                                 this result



                                                                 transmitted



                                                                 reference range

:



                                                                 150 - 328 10*3/

?L.



                                                                 The reference r

gwendolyn



                                                                 was not used to



                                                                 interpret this



                                                                 result as



                                                                 normal/abnormal

.

 

             MPV (test code = 11.7 fL      9.8-13.0                  



             73780-5)                                            

 

             NRBC/100 WBC (test              See_Comment                [Automat

ed



             code = 9215503890)                                        message] 

The system



                                                                 which generated



                                                                 this result



                                                                 transmitted



                                                                 reference range

:



                                                                 0.0 - 10.0 /100



                                                                 WBCs. The refer

ence



                                                                 range was not u

sed



                                                                 to interpret th

is



                                                                 result as



                                                                 normal/abnormal

.

 

             NRBC x10^3 (test code <0.01        See_Comment                [Auto

mated



             = [id number])                                        message] The s

ystem



                                                                 which generated



                                                                 this result



                                                                 transmitted



                                                                 reference range

:



                                                                 10*3/?L. The



                                                                 reference range

 was



                                                                 not used to



                                                                 interpret this



                                                                 result as



                                                                 normal/abnormal

.

 

             GRAN MAT (NEUT) % 68.0 %                                 



             (test code = 770-8)                                        

 

             IMM GRAN % (test code 0.50 %                                 



             = [id number])                                        

 

             LYMPH % (test code = 16.9 %                                 



             736-9)                                              

 

             MONO % (test code = 10.5 %                                 



             5905-5)                                             

 

             EOS % (test code = 3.4 %                                  



             713-8)                                              

 

             BASO % (test code = 0.7 %                                  



             706-2)                                              

 

             GRAN MAT x10^3(ANC) 2.98 10*3/uL 1.99-6.95                 



             (test code =                                        



             8523349641)                                         

 

             IMM GRAN x10^3 (test <0.03        0.00-0.06                 



             code = 2999936687)                                        

 

             LYMPH x10^3 (test code 0.74 10*3/uL 1.09-3.23    L            



             = 731-0)                                            

 

             MONO x10^3 (test code 0.46 10*3/uL 0.36-1.02                 



             = 742-7)                                            

 

             EOS x10^3 (test code = 0.15 10*3/uL 0.06-0.53                 



             711-2)                                              

 

             BASO x10^3 (test code 0.03 10*3/uL 0.01-0.09                 



             = 704-7)                                            

 

             Lab Interpretation Abnormal                               



             (test code = 65336-0)                                        



[facility]

## 2022-10-03 ENCOUNTER — HOSPITAL ENCOUNTER (EMERGENCY)
Dept: HOSPITAL 97 - ER | Age: 57
Discharge: HOME | End: 2022-10-03
Payer: SELF-PAY

## 2022-10-03 VITALS — DIASTOLIC BLOOD PRESSURE: 98 MMHG | TEMPERATURE: 97.8 F | SYSTOLIC BLOOD PRESSURE: 148 MMHG | OXYGEN SATURATION: 98 %

## 2022-10-03 DIAGNOSIS — J44.9: ICD-10-CM

## 2022-10-03 DIAGNOSIS — M32.9: ICD-10-CM

## 2022-10-03 DIAGNOSIS — R21: Primary | ICD-10-CM

## 2022-10-03 PROCEDURE — 99282 EMERGENCY DEPT VISIT SF MDM: CPT

## 2022-10-03 NOTE — EDPHYS
Physician Documentation                                                                           

 [facility]                                                                 

Name: Mack Ramírez                                                                                    

Age: 57 yrs                                                                                       

Sex: Male                                                                                         

: 1965                                                                                   

MRN: [id number]                                                                                   

Arrival Date: 10/03/2022                                                                          

Time: 09:47                                                                                       

Account#: [id number]                                                                            

Bed 2                                                                                             

Private MD:                                                                                       

ED Physician Ivan Mccall                                                                        

HPI:                                                                                              

10/03                                                                                             

10:33 This 57 yrs old Male presents to ER via Ambulatory with complaints of Lupus Flare Up.   jr11

10:33 Is a 57-year-old that states that when he starts running out of prednisone he starts    jr11

      getting mouth ulcers, started getting mouth ulcers last week, and of his prednisone for     

      1 day. Patient is here for prescription refill of prednisone, takes 20 mg but cut some      

      in half. Patient is working on getting into a lupus clinic.. Onset: The                     

      symptoms/episode began/occurred last week. Severity of symptoms: At their worst the         

      symptoms were moderate in the emergency department the symptoms are worse. No fever,        

      chills, nausea, vomiting, abd pain.                                                         

                                                                                                  

Historical:                                                                                       

- Allergies:                                                                                      

10:22 No Known Allergies;                                                                     ph  

- Home Meds:                                                                                      

10:22 prednisone 20 mg Oral tab once daily [Active];                                          ph  

- PMHx:                                                                                           

10:22 COPD; Kidney stones; Lupus; low back pain;                                              ph  

                                                                                                  

- Immunization history:: Adult Immunizations unknown.                                             

- Social history:: Smoking status: Patient denies any tobacco usage or history of.                

                                                                                                  

                                                                                                  

ROS:                                                                                              

10:33 All other systems are negative.                                                         jr11

                                                                                                  

Exam:                                                                                             

10:33 Constitutional:  This is a well developed, well nourished patient who is awake, alert,  jr11

      and in no acute distress. Head/Face:  Normocephalic, atraumatic. Eyes:  Extra-ocular        

      motions intact.  Lids and lashes normal.  Conjunctiva and sclera are non-icteric and        

      not injected.  Cornea within normal limits.  Periorbital areas with no swelling,            

      redness, or edema. ENT:  small amount ulceration to inner lip mucosa Neck:  Trachea         

      midline, no thyromegaly or masses palpated, and no cervical lymphadenopathy.  Supple,       

      full range of motion without nuchal rigidity, or vertebral point tenderness.  No            

      Meningismus. Chest/axilla:  Normal chest wall appearance and motion.  Nontender with no     

      deformity.  No lesions are appreciated. Cardiovascular:  Regular rate and rhythm with a     

      normal S1 and S2.  No gallops, murmurs, or rubs.  Normal PMI, no JVD.  No pulse             

      deficits. Abdomen/GI:  Soft, non-tender, with normal bowel sounds.  No distension or        

      tympany.  No guarding or rebound.  No evidence of tenderness throughout. Back:  No          

      spinal tenderness.  No costovertebral tenderness.  Full range of motion. Skin:              

      erythematous plaques diffusely  MS/ Extremity:  Pulses equal, no cyanosis.                  

      Neurovascular intact.  Full, normal range of motion.                                        

                                                                                                  

Vital Signs:                                                                                      

10:18  / 104; Pulse 56; Resp 18; Temp 98.1; Pulse Ox 96% on R/A; Weight 106.59 kg;      ph  

      Height 6 ft. 1 in. (185.42 cm); Pain 8/10;                                                  

10:48  / 98; Pulse 57; Resp 18; Temp 97.8; Pulse Ox 98% on R/A;                         ph  

10:18 Body Mass Index 31.00 (106.59 kg, 185.42 cm)                                            ph  

                                                                                                  

MDM:                                                                                              

10:32 Patient medically screened.                                                             jr11

10:33 Differential Diagnosis lupus, rash, medication refill. Data reviewed: vital signs,      jr11

      nurses notes. ED course: Pt understands that this is not good care to continue to get       

      prednisone from ED. He needs to follow up with specialist. He understands risks of on       

      going prednisone use. ER warnings given, will give prednisone since it is more              

      detrimental to stop abruptly. .                                                             

                                                                                                  

Administered Medications:                                                                         

No medications were administered                                                                  

                                                                                                  

                                                                                                  

Disposition Summary:                                                                              

10/03/22 10:36                                                                                    

Discharge Ordered                                                                                 

      Location: Home                                                                          jr

      Condition: Stable                                                                       jr11

      Diagnosis                                                                                   

        - Rash and other nonspecific skin eruption                                            jr11

        - Systemic lupus erythematosus, unspecified                                           jr11

      Discharge Instructions:                                                                     

        - Discharge Summary Sheet                                                             jr11

        - Systemic Lupus Erythematosus, Adult                                                 jr11

      Forms:                                                                                      

        - Medication Reconciliation Form                                                      jr11

        - Thank You Letter                                                                    jr11

        - Antibiotic Education                                                                jr11

        - Prescription Opioid Use                                                             jr11

      Prescriptions:                                                                              

        - Prednisone 20 mg Oral Tablet                                                             

            - take 1 tablet by ORAL route once daily for 30 days; 30 tablet; Refills: 0,      jr11

      Product Selection Permitted                                                                 

Signatures:                                                                                       

Janell Jimenez RN                      RN                                                      

Ivan Mccall MD MD   jr11                                                 

                                                                                                  

**************************************************************************************************

## 2022-10-03 NOTE — XMS REPORT
Continuity of Care Document

                           Created on:October 3, 2022



Patient:REMY ROACH CHETAN

Sex:Male

:1965

External Reference #:[id number]





Demographics







                          Address                   [address]



                                                    [place] [address]

 

                          Home Phone                [phone]

 

                          Mobile Phone              [phone]

 

                          Email Address             [email or web address]

 

                          Preferred Language        English

 

                          Marital Status            Unknown

 

                          Mosque Affiliation     Unknown

 

                          Race                      Unknown

 

                          Additional Race(s)        Unavailable

 

                          Ethnic Group              Unknown









Author







                          Organization              [facility]

t

 

                          Address                   [address]



                                                    [place] [address]

 

                          Phone                     [phone]









Support







                Name            Relationship    Address         Phone

 

                TI ROACH     X               [phone]

[phone]



                                                [place] [address] 









Care Team Providers







                    Name                Role                Phone

 

                    PCP, PATIENT DOES NOT HAVE A Primary Care Physician UnavailCELINA Yañez     Attending Clinician Unavailable

 

                    Celina Lan DO  Attending Clinician [phone]

 

                    Doctor Unassigned, No Name Attending Clinician Unavailable

 

                    Annemarie Hannah NP Attending Clinician [phone]

 

                    Chiquis Sanchez RN    Attending Clinician Unavailable

 

                    [facility] Attending Clinician Unavailable

 

                    Ness Sol  Attending Clinician [phone]

 

                    CELINA LAN     Admitting Clinician Unavailable









Payers







           Payer Name Policy Type Policy Number Effective Date Expiration Date S

ource







Problems







       Condition Condition Condition Status Onset  Resolution Last   Treating Co

mments 

Source



       Name   Details Category        Date   Date   Treatment Clinician        



                                                 Date                 

 

       No known No known Disease                                           Un[facility]

rs



       active active                                                  ity of



       problems problems                                                  [facility]







Allergies, Adverse Reactions, Alerts







       Allergy Allergy Status Severity Reaction(s) Onset  Inactive Treating Comm

ents 

Source



       Name   Type                        Date   Date   Clinician        

 

       NO KNOWN Drug   Active                                           [facility]



       ALLERGIE Class                                                   [facility]



       S                                                              [facility]







Social History







           Social Habit Start Date Stop Date  Quantity   Comments   Source

 

           Exposure to                       Not sure              [facility]



           SARS-CoV-2                                             [facility]



           (event)                                                Branch

 

           Alcohol intake 2021 Current               [facility]

 of



                      00:00:00   00:00:00   non-drinker of            [place] [facility]

sharona



                                            alcohol               Branch



                                            (finding)             

 

           Tobacco use and 2018 Never used            [facility]

y of



           exposure   00:00:00   00:00:00                         [facility]

 

           Sex Assigned At 1965                       [facility]

y of



           Birth      00:00:00   00:00:00                         [facility]









                Smoking Status  Start Date      Stop Date       Source

 

                Current every day smoker 2018 00:00:00                 Uni

versity of [facility]







Medications







       Ordered Filled Start  Stop   Current Ordering Indication Dosage Frequency

 Signature

                    Comments            Components          Source



     Medication Medication Date Date Medication? Clinician                (SIG) 

          



     Name Name                                                   

 

     naproxen            Yes       [id number] 550mg      Take 1           

Univers



     sodium      2-23                               tablet by           ity of



     (ANAPROX      00:00:                               mouth 2           [place]



     DS) 550 mg      00                                 (two)           Medical



     tablet                                         times           Branch



                                                  daily with           



                                                  meals.           

 

     predniSONE            Yes       [id number]           Take bid          

 [facility]



     10 mg      2-02                               for 1 week           ity of



     tablet      00:00:                               then QD           [place]



               00                                 thereafter           Medical



                                                  for lupus           Branch

 

     predniSONE            Yes       [id number]           Take bid          

 [facility]



     10 mg      2-02                               for 1 week           ity of



     tablet      00:00:                               then QD           [place]



               00                                 thereafter           Medical



                                                  for lupus           Branch

 

     predniSONE            Yes       [id number]           Take bid          

 [facility]



     10 mg      2-02                               for 1 week           ity of



     tablet      00:00:                               then QD           [place]



               00                                 thereafter           Medical



                                                  for lupus           Branch

 

     predniSONE      -2022- No        [id number]           Take bid         

  [facility]



     10 mg      2-02 02-02                          for 1 week           ity of



     tablet      00:00: 00:00                          then QD           [place]



               00   :00                           thereafter           Medical



                                                  for lupus           Branch

 

     predniSONE      -2021- No        [id number]           Take 1          

 [facility]



     5 mg tablet                                tablet by           it

y of



               00:00: 04:59                          mouth           [place]



               00   :00                           daily for           Medical



                                                  7 days,           Branch



                                                  THEN 0.5           



                                                  tablets           



                                                  daily for           



                                                  7 days.           

 

     predniSONE      -2021- No        [id number]           Take 1          

 Univers



     5 mg tablet                                tablet by           it

y of



               00:00: 04:59                          mouth           [place]



               00   :00                           daily for           Medical



                                                  7 days,           Branch



                                                  THEN 0.5           



                                                  tablets           



                                                  daily for           



                                                  7 days.           

 

     predniSONE      -2021- No             20mg      Take 20 mg           

[facility]



     (DELTASONE)      8-25 08-25                          by mouth           ity

 of



     20 mg      21:01: 00:00                          daily.           [place]



     tablet      23   :00                                          Medical



                                                                 Branch

 

     predniSONE      -2021- No             20mg      Take 20 mg           

[facility]



     (DELTASONE)      8-25 08-25                          by mouth           ity

 of



     20 mg      21:01: 00:00                          daily.           [place]



     tablet      23   :00                                          Medical



                                                                 Branch

 

     predniSONE      2021- No        [id number]           Take 0.5        

   [facility]



     20 mg      8-25 -09                          tablets by           ity of



     tablet      00:00: 04:59                          mouth 2           [place]



               00   :00                           (two)           Medical



                                                  times           [place]



                                                  daily for           



                                                  7 days,           



                                                  THEN 0.5           



                                                  tablets           



                                                  daily for           



                                                  7 days.           

 

     predniSONE      2021- No        [id number]           Take 0.5        

   [facility]



     20 mg      8-25 -09                          tablets by           ity of



     tablet      00:00: 04:59                          mouth 2           [place]



               00   :00                           (two)           [facility]



                                                  daily for           



                                                  7 days,           



                                                  THEN 0.5           



                                                  tablets           



                                                  daily for           



                                                  7 days.           

 

     methylPREDN      2018-      Yes            84mg      Take 21           Uni

vers



     ISolone      5-07                               tablets by           ity of



     (MEDROL,      00:00:                               mouth           [place]



     TEGAN,) 4 mg      00                                 SEE-INSTRU           Med

ical



     tablets                                         CTIONS.           Branch



                                                  follow           



                                                  package           



                                                  directions           

 

     methylPREDN      -0      Yes            84mg      Take 21           Uni

vers



     ISolone      5-07                               tablets by           ity of



     (MEDROL,      00:00:                               mouth           [place]



     TEGAN,) 4 mg      00                                 SEE-INSTRU           Med

ical



     tablets                                         CTIONS.           Branch



                                                  follow           



                                                  package           



                                                  directions           

 

     methylPREDN      -0      Yes            84mg      Take 21           Uni

vers



     ISolone      5-07                               tablets by           ity of



     (MEDROL,      00:00:                               mouth           [place]



     TEGAN,) 4 mg      00                                 SEE-INSTRU           Med

ical



     tablets                                         CTIONS.           Branch



                                                  follow           



                                                  package           



                                                  directions           

 

     methylPREDN      2018-0      Yes            84mg      Take 21           Uni

vers



     ISolone      5-07                               tablets by           ity of



     (MEDROL,      00:00:                               mouth           [place]



     TEGAN,) 4 mg      00                                 SEE-INSTRU           Med

ical



     tablets                                         CTIONS.           Branch



                                                  follow           



                                                  package           



                                                  directions           

 

     methylPREDN      -0 - No             84mg      Take 21           Un

radha



     ISolone      5-07 08-25                          tablets by           ity o

f



     (MEDROL,      00:00: 00:00                          mouth           [place]



     TEGAN,) 4 mg      00   :00                           SEE-INSTRU           Med

ical



     tablets                                         CTIONS.           Branch



                                                  follow           



                                                  package           



                                                  directions           

 

     methylPREDN      -0 - No             84mg      Take 21           Un

radha



     ISolone      5-07 08-25                          tablets by           ity o

f



     (MEDROL,      00:00: 00:00                          mouth           [place]



     TEGAN,) 4 mg      00   :00                           SEE-INSTRU           Med

ical



     tablets                                         CTIONS.           Branch



                                                  follow           



                                                  package           



                                                  directions           

 

     predniSONE      2018-0      Yes            20mg      Take 20 mg           U

nivers



     (DELTASONE)      4-12                               by mouth           ity 

of



     20 mg      14:00:                               daily.           [place]



     tablet      56                                                Medical



                                                                 Branch

 

     predniSONE      2018-0      Yes            20mg      Take 20 mg           U

nivers



     (DELTASONE)      4-12                               by mouth           ity 

of



     20 mg      14:00:                               daily.           [place]



     tablet      56                                                Medical



                                                                 Branch

 

     predniSONE      2018-0      Yes            20mg      Take 20 mg           U

nivers



     (DELTASONE)      4-12                               by mouth           ity 

of



     20 mg      14:00:                               daily.           [place]



     tablet      56                                                Medical



                                                                 Branch

 

     predniSONE      2018-0      Yes            20mg      Take 20 mg           U

nivers



     (DELTASONE)      4-12                               by mouth           ity 

of



     20 mg      14:00:                               daily.           [place]



     tablet      56                                                Medical



                                                                 Branch

 

     traMADOL 50      2018-0      Yes            50mg      Take 1           Univ

ers



     mg tablet      2-19                               tablet by           ity o

f



               00:00:                               mouth           [place]



               00                                 every 4           Medical



                                                  (four)           Branch



                                                  hours as           



                                                  needed for           



                                                  Pain           



                                                  (scale           



                                                  4-6) or           



                                                  Pain           



                                                  unrelieved           



                                                  by             



                                                  non-narcot           



                                                  ic             



                                                  analgesics           



                                                  .              

 

     traMADOL 50      2018-0      Yes            50mg      Take 1           Univ

ers



     mg tablet      2-19                               tablet by           ity o

f



               00:00:                               mouth           [place]



               00                                 every 4           Medical



                                                  (four)           Branch



                                                  hours as           



                                                  needed for           



                                                  Pain           



                                                  (scale           



                                                  4-6) or           



                                                  Pain           



                                                  unrelieved           



                                                  by             



                                                  non-narcot           



                                                  ic             



                                                  analgesics           



                                                  .              

 

     traMADOL 50      2018-0      Yes            50mg      Take 1           Univ

ers



     mg tablet      2-19                               tablet by           ity o

f



               00:00:                               mouth           [place]



               00                                 every 4           Medical



                                                  (four)           Branch



                                                  hours as           



                                                  needed for           



                                                  Pain           



                                                  (scale           



                                                  4-6) or           



                                                  Pain           



                                                  unrelieved           



                                                  by             



                                                  non-narcot           



                                                  ic             



                                                  analgesics           



                                                  .              

 

     traMADOL 50      2018-0      Yes            50mg      Take 1           Univ

ers



     mg tablet      2-19                               tablet by           ity o

f



               00:00:                               mouth           [place]



               00                                 every 4           Medical



                                                  (four)           Branch



                                                  hours as           



                                                  needed for           



                                                  Pain           



                                                  (scale           



                                                  4-6) or           



                                                  Pain           



                                                  unrelieved           



                                                  by             



                                                  non-narcot           



                                                  ic             



                                                  analgesics           



                                                  .              

 

     traMADOL 50      -0 - No             50mg      Take 1           Uni

vers



     mg tablet      2-19 08-25                          tablet by           ity 

of



               00:00: 00:00                          mouth           [place]



               00   :00                           every 4           Medical



                                                  (four)           Branch



                                                  hours as           



                                                  needed for           



                                                  Pain           



                                                  (scale           



                                                  4-6) or           



                                                  Pain           



                                                  unrelieved           



                                                  by             



                                                  non-narcot           



                                                  ic             



                                                  analgesics           



                                                  .              

 

     traMADOL 50      2018-0 - No             50mg      Take 1           Uni

vers



     mg tablet      2-19 08-25                          tablet by           ity 

of



               00:00: 00:00                          mouth           [place]



               00   :00                           every 4           Medical



                                                  (four)           Branch



                                                  hours as           



                                                  needed for           



                                                  Pain           



                                                  (scale           



                                                  4-6) or           



                                                  Pain           



                                                  unrelieved           



                                                  by             



                                                  non-narcot           



                                                  ic             



                                                  analgesics           



                                                  .              

 

     acetaminoph            Yes                      1-2 by           [facility]



     en-codeine                                     mouth           ity of



     (TYLENOL-CO      00:00:                               every 4-6           T

exas



     DEINE #3)      00                                 hours as           Medica

l



     300-30 mg                                         needed prn           Bran

ch



     tablet                                         pain           

 

     acetaminoph            Yes                      1-2 by           [facility]



     en-codeine                                     mouth           ity of



     (TYLENOL-CO      00:00:                               every 4-6           [place]

[place]



     DEINE #3)      00                                 hours as           Medica

l



     300-30 mg                                         needed prn           Bran

ch



     tablet                                         pain           

 

     acetaminoph            Yes                      1-2 by           [facility]



     en-codeine                                     mouth           ity of



     (TYLENOL-CO      00:00:                               every 4-6           [place]

[place]



     DEINE #3)      00                                 hours as           Medica

l



     300-30 mg                                         needed prn           Bran

ch



     tablet                                         pain           

 

     acetaminoph            Yes                      1-2 by           [facility]



     en-codeine                                     mouth           ity of



     (TYLENOL-CO      00:00:                               every 4-6           T

[place]



     DEINE #3)      00                                 hours as           Medica

l



     300-30 mg                                         needed prn           Bran

ch



     tablet                                         pain           

 

     acetaminoph      2021- No                       1-2 by           [facility]



     en-codeine       08-25                          mouth           ity of



     (TYLENOL-CO      00:00: 00:00                          every 4-6           

[place]



     DEINE #3)      00   :00                           hours as           Medica

l



     300-30 mg                                         needed prn           Bran

ch



     tablet                                         pain           

 

     acetaminoph      2021- No                       1-2 by           [facility]



     en-codeine      2- 08-25                          mouth           ity of



     (TYLENOL-CO      00:00: 00:00                          every 4-6           

[place]



     DEINE #3)      00   :00                           hours as           Medica

l



     300-30 mg                                         needed prn           Bran

ch



     tablet                                         pain           

 

     docusate            Yes            100mg      Take 1           Univer

s



     (COLACE)      4-20                               capsule by           ity o

f



     100 mg      00:00:                               mouth 3           [place]



     capsule      00                                 (three)           Medical



                                                  times           Branch



                                                  daily.           

 

     Polyethylen            Yes            1{packe      Take 1           U

nivers



     e Glycol      4-20                     t}        Packet by           ity of



     3350      00:00:                               mouth 2           [place]



     (MIRALAX)      00                                 (two)           Medical



     17 gram                                         times           Branch



     powder                                         daily.           

 

     docusate            Yes            100mg      Take 1           Univer

s



     (COLACE)      4-20                               capsule by           ity o

f



     100 mg      00:00:                               mouth 3           [place]



     capsule      00                                 (three)           Medical



                                                  times           Branch



                                                  daily.           

 

     Polyethylen            Yes            1{packe      Take 1           U

nivers



     e Glycol      4-20                     t}        Packet by           ity of



     3350      00:00:                               mouth 2           [place]



     (MIRALAX)      00                                 (two)           Medical



     17 gram                                         times           Branch



     powder                                         daily.           

 

     docusate            Yes            100mg      Take 1           Univer

s



     (COLACE)      4-20                               capsule by           ity o

f



     100 mg      00:00:                               mouth 3           [place]



     capsule      00                                 (three)           Medical



                                                  times           Branch



                                                  daily.           

 

     docusate            Yes            100mg      Take 1           Univer

s



     (COLACE)      4-20                               capsule by           ity o

f



     100 mg      00:00:                               mouth 3           [place]



     capsule      00                                 (three)           Medical



                                                  times           Branch



                                                  daily.           

 

     Polyethylen            Yes            1{packe      Take 1           U

nivers



     e Glycol      4-20                     t}        Packet by           ity of



     3350      00:00:                               mouth 2           [place]



     (MIRALAX)      00                                 (two)           Medical



     17 gram                                         times           Branch



     powder                                         daily.           

 

     Polyethylen            Yes            1{packe      Take 1           U

nivers



     e Glycol      4-20                     t}        Packet by           ity of



     3350      00:00:                               mouth 2           [place]



     (MIRALAX)      00                                 (two)           Medical



     17 gram                                         times           Branch



     powder                                         daily.           

 

     docusate      2021- No             100mg      Take 1           Unive

rs



     (COLACE)      4-20 08-25                          capsule by           ity 

of



     100 mg      00:00: 00:00                          mouth 3           [place]



     capsule      00   :00                           (three)           Medical



                                                  times           Branch



                                                  daily.           

 

     Polyethylen      2021- No             1{packe      Take 1           

Univers



     e Glycol      4-20 08-25                t}        Packet by           ity o

f



     3350      00:00: 00:00                          mouth 2           [place]



     (MIRALAX)      00   :00                           (two)           Medical



     17 gram                                         times           Branch



     powder                                         daily.           

 

     docusate      2021- No             100mg      Take 1           Unive

rs



     (COLACE)      4-20 08-25                          capsule by           ity 

of



     100 mg      00:00: 00:00                          mouth 3           [place]



     capsule      00   :00                           (three)           Medical



                                                  times           Branch



                                                  daily.           

 

     Polyethylen      2021- No             1{packe      Take 1           

Univers



     e Glycol      4-20 08-25                t}        Packet by           martínez mcdaniel

f



     3787      00:00: 00:00                          mouth 2           [place]



     (MIRALAX)      00   :00                           (two)           Medical



     17 gram                                         times           Branch



     powder                                         daily.           







Vital Signs







             Vital Name   Observation Time Observation Value Comments     Source

 

             Systolic blood 2022 18:52:58 140 mm[Hg]                Univer

sity of



             pressure                                            [place] [facility]



                                                                 Branch

 

             Diastolic blood 2022 18:52:58 82 mm[Hg]                 Unive

rsity of



             pressure                                            [place] [facility]



                                                                 Branch

 

             Heart rate   2022 18:52:58 88 /min                   Universi

ty of



                                                                 [place] [facility]



                                                                 Branch

 

             Respiratory rate 2022 18:52:58 18 /min                   Univ

ersity of



                                                                 [facility]



                                                                 Branch

 

             Oxygen saturation in 2022 18:52:58 99 /min                   

[facility]



             Arterial blood by                                        [place] [facility]

sharona



             Pulse oximetry                                        Branch

 

             Body temperature 2022 16:31:00 36.44 Ameena                 Univ

ersity of



                                                                 [place] [facility]



                                                                 Branch

 

             Body weight  2022 16:30:00 90.719 kg                 Universi

ty of



                                                                 [place] [facility]



                                                                 Branch

 

             BMI          2022 16:30:00 26.39 kg/m2               Universi

ty of



                                                                 [place] [facility]



                                                                 Branch

 

             Systolic blood 2022 18:06:00 143 mm[Hg]                Univer

sity of



             pressure                                            [place] [facility]



                                                                 Branch

 

             Diastolic blood 2022 18:06:00 91 mm[Hg]                 Unive

rsity of



             pressure                                            [place] [facility]



                                                                 Branch

 

             Heart rate   2022 18:04:00 60 /min                   Universi

ty of



                                                                 [place] [facility]



                                                                 Branch

 

             Body temperature 2022 18:04:00 37.06 Ameena                 Univ

ersity of



                                                                 [place] [facility]



                                                                 Branch

 

             Respiratory rate 2022 18:04:00 18 /min                   Univ

ersity of



                                                                 [place] [facility]



                                                                 Branch

 

             Body weight  2022 18:04:00 90.719 kg                 Universi

ty of



                                                                 [place] [facility]



                                                                 Branch

 

             BMI          2022 18:04:00 26.39 kg/m2               Universi

ty of



                                                                 [place] [facility]



                                                                 Branch

 

             Oxygen saturation in 2022 18:04:00 96 /min                   

[facility]



             Arterial blood by                                        [place] [facility]

sharona



             Pulse oximetry                                        Branch

 

             Systolic blood 2021 20:25:00 149 mm[Hg]                Univer

sity of



             pressure                                            [place] [facility]



                                                                 Branch

 

             Diastolic blood 2021 20:25:00 110 mm[Hg]                Unive

rsity of



             pressure                                            [facility]

 

             Heart rate   2021 20:25:00 75 /min                   Universi

ty of



                                                                 [facility]

 

             Body temperature 2021 20:25:00 36.33 Ameena                 Univ

ersity of



                                                                 [facility]

 

             Respiratory rate 2021 20:25:00 18 /min                   Univ

ersity of



                                                                 [facility]

 

             Body weight  2021 20:25:00 90.719 kg                 Universi

ty of



                                                                 [place] [facility]



                                                                 [place]

 

             BMI          2021 20:25:00 26.39 kg/m2               Universi

ty of



                                                                 [facility]

 

             Oxygen saturation in 2021 20:25:00 95 /min                   

[facility]



             Arterial blood by                                        [place] [facility]

sharona



             Pulse oximetry                                        Branch

 

             Systolic blood 2020 15:29:00 156 mm[Hg]                Univer

sity of



             pressure                                            [facility]

 

             Diastolic blood 2020 15:29:00 97 mm[Hg]                 Unive

rsity of



             pressure                                            [facility]

 

             Heart rate   2020 15:29:00 66 /min                   Universi

ty of



                                                                 [facility]

 

             Body temperature 2020 15:22:00 36.67 Ameena                 Univ

ersity of



                                                                 [facility]

 

             Respiratory rate 2020 15:22:00 18 /min                   [facility]



                                                                 [facility]

 

             Body height  2020 15:22:00 185.4 cm                  Universi

ty of



                                                                 [place] [facility]



                                                                 [place]

 

             Body weight  2020 15:22:00 90.719 kg                 Universi

ty of



                                                                 [place] [facility]



                                                                 [place]

 

             BMI          2020 15:22:00 26.39 kg/m2               Universi

ty [facility]

 

             Oxygen saturation in 2020 15:22:00 95 /min                   

[facility]



             Arterial blood by                                        [place] [facility]

sharona



             Pulse oximetry                                        Branch







Procedures







                Procedure       Date / Time Performed Performing Clinician Sourc

e

 

                XR KNEE 3 VW LEFT 2022 16:49:35 Celina Lan [facility]

 

                CONSENT/REFUSAL FOR 2022 16:22:18 Doctor Unassigned, No Un

[facility]



                DIAGNOSIS AND                   Name            Medical Branch



                TREATMENT                                       

 

                COMP. METABOLIC PANEL 2022 19:06:00 Celina Lan

[facility]



                ([id number])                                         [facility]

 

                CBC WITH DIFF   2022 19:06:00 Celina Lan o

f [facility]

 

                NOTICE OF PRIVACY 2022 17:29:34 Doctor Unassigned, No Univ

[facility] [place]



                PRACTICES                       Name            [facility]

 

                CONSENT/REFUSAL FOR 2022 17:29:14 Doctor Unassigned, No Un

iversity of 

[place]



                DIAGNOSIS AND                   Name            [facility]



                TREATMENT                                       

 

                CONSENT/REFUSAL FOR 2021 20:11:27 Doctor Unassigned, No Un

iversity of 

[place]



                DIAGNOSIS AND                   Name            [facility]



                TREATMENT                                       







Encounters







        Start   End     Encounter Admission Attending Care    Care    Encounter 

Source



        Date/Time Date/Time Type    Type    Clinicians Facility Department ID   

   

 

        2021         Emergency                 [facility]    [id number] 

Univers



        18:06:45                                                         ity [facility]

 

        2022 [facility]       [facility]     [id number]

43 Univers



        10:34:00 12:59:00                 CELINA soliz [facility]

 

        2022 [facility]         [facility]    [id number]

[id number] Univers



        10:34:00 12:59:00                 Celina HARPER [id number]         i

ty of



                                                [place] [id number]         [facility]  [phone]         [address]

 

        2022 Orders          Doctor CHAPMAN    [id number]

96 Univers



        00:00:00 00:00:00 Only            Unassigned, DORIS   [id number]       

  ity of



                                        [facility] [id number]         [place]

as



                                                        [phone]         [address]

 

        2022 Emergency        [facility]    ERT     [id number]

58 Univers



        12:06:00 13:47:00                 CELINA soliz [facility]

 

        2022 [facility]         [facility]    [id number]

4684 Univers



        12:06:00 13:47:00                 Celina HARPER [id number]         i

ty of



                                                [place] [id number]         [facility]  [phone]         [address]

 

        2021 Emergency         [facility]    [id number]

0722 Univers



        15:27:00 16:34:00                 Annemarie Harper [id number]         

ity of



                                                [place] [id number]         [place]

[facility]  [phone]         Medi

sharona



                                                        [address]             [place]

 

        2021 Orders          Doctor  ADELA    [id number]

83 Univers



        00:00:00 00:00:00 Only            Unassigned, DORIS   350.1.13.10       

  ity of



                                        [facility] [id number]         [place]

as



                                                        [phone]         Medi

sharona



                                                        [address]             [place]

 

        2020 Telephone         ADELA Sanchez    [id number]

6715 Univers



        00:00:00 00:00:00                 Chiquis GEORGE   350.1.13.10         it

y of



                                                HOSPITAL [id number]         [place]

as



                                                        [phone]         Medi

sharona



                                                        [address]             [place]

 

        2020 Urgent          [facility]    1.

[id number] 

Univers



        10:06:34 10:26:34 Care            Ness Wooten [facility]  [id number]    

     ity of



                                                [place] [id number]         [place]

as



                                                [employer] [phone]         Me

dical



                                                [facility]     [address]             [place]



                                                Office                  



                                                Building                 



                                                One                     

 

        2020 Outpatient R               [facility]    [id number]

284 Univers



        10:00:00 10:00:00                                                 ity of



                                                                        [facility]







Results







           Test Description Test Time  Test Comments Results    Result Comments 

Source









                    COMP. METABOLIC PANEL ([id number]) 2022 19:24:53 









                      Test Item  Value      Reference Range Interpretation Comme

nts









             NA (test code = 9687584408) 139 mmol/L   135-145                   

 

             K (test code = 6723761604) 4.3 mmol/L   3.5-5.0                   

 

             CL (test code = 4013287041) 102 mmol/L                       

 

             CO2 TOTAL (test code = 3001782444) 32 mmol/L    23-31        H     

       

 

             AGAP (test code = 4542758041)              2-16                    

  

 

             BUN (test code = 5847284259) 3 mg/dL      7-23         L           

 

 

             GLUCOSE (test code = 6590745080) 90 mg/dL                    

     

 

             CREATININE (test code = 0.59 mg/dL   0.60-1.25    L            



             [id number])                                         

 

             TOTAL BILI (test code = 0.6 mg/dL    0.1-1.1                   



             [id number])                                         

 

             CALCIUM (test code = 4704796035) 8.1 mg/dL    8.6-10.6     L       

     

 

             T PROTEIN (test code = 2922525871) 7.3 g/dL     6.3-8.2            

       

 

             ALBUMIN (test code = 5659643370) 3.8 g/dL     3.5-5.0              

     

 

             ALK PHOS (test code = 9483662656) 88 U/L                     

      

 

             ALTv (test code = 1742-6) 10 U/L       5-50                      

 

             AST(SGOT) (test code = 1192026614) 26 U/L       13-40              

       

 

             eGFR (test code = 2629569031)              mL/min/1.73m2           

   

 

             LISANDRA (test code = LISANDRA) Association of Glomerular                    

       



                          Filtration Rate (GFR) and Staging                     

      



                          of Kidney Disease*                           



                          +-----------------------+--------                     

      



                          -------------+-------------------                     

      



                          ------+| GFR (mL/min/1.73 m2) ?|                      

     



                          With Kidney Damage ?| ?Without                        

   



                          Kidney                                 



                          Damage+-----------------------+--                     

      



                          -------------------+-------------                     

      



                          ------------+| ?>90 ? ? ? ? ? ? ?                     

      



                          ? ?| ?Stage one ? ? ? ? ?| ?                          

 



                          Normal ? ? ? ? ? ? ?                           



                          ?+-----------------------+-------                     

      



                          --------------+------------------                     

      



                          -------+| ?60-89 ? ? ? ? ? ? ? ?|                     

      



                          ?Stage two ? ? ? ? ?| ? Decreased                     

      



                          GFR ? ? ? ?                            



                          +-----------------------+--------                     

      



                          -------------+-------------------                     

      



                          ------+| ?30-59 ? ? ? ? ? ? ? ?|                      

     



                          ?Stage three ? ? ? ?| ? Stage                         

  



                          three ? ? ? ? ?                           



                          +-----------------------+--------                     

      



                          -------------+-------------------                     

      



                          ------+| ?15-29 ? ? ? ? ? ? ? ?|                      

     



                          ?Stage four ? ? ? ? | ? Stage                         

  



                          four ? ? ? ? ?                           



                          ?+-----------------------+-------                     

      



                          --------------+------------------                     

      



                          -------+| ?<15 (or dialysis) ? ?|                     

      



                          ?Stage five ? ? ? ? | ? Stage                         

  



                          five ? ? ? ? ?                           



                          ?+-----------------------+-------                     

      



                          --------------+------------------                     

      



                          -------+ *Each stage assumes the                      

     



                          associated GFR level has been in                      

     



                          effect for at least three months.                     

      



                          ?Stages 1 to 5, with or without                       

    



                          kidney disease, indicate chronic                      

     



                          kidney disease. Notes:                           



                          Determination of stages one and                       

    



                          two (with eGFR >59mL/min/1.73 m2)                     

      



                          requires estimation of kidney                         

  



                          damage for at least three months                      

     



                          as defined by structural or                           



                          functional abnormalities of the                       

    



                          kidney, manifested by                           



                          either:Pathological abnormalities                     

      



                          or Markers of kidney damage                           



                          (including abnormalities in the                       

    



                          composition of the blood or urine                     

      



                          or abnormalities in imaging                           



                          tests).                                

 

             Lab Interpretation (test code = Abnormal                           

    



             66369-5)                                            



[facility] WITH [id number] 19:22:10





             Test Item    Value        Reference Range Interpretation Comments

 

             WBC (test code =              See_Comment                [Automated



             6690-2)                                             message] The sy

stem



                                                                 which generated



                                                                 this result



                                                                 transmitted



                                                                 reference range

:



                                                                 4.20 - 10.70



                                                                 10*3/?L. The



                                                                 reference range

 was



                                                                 not used to



                                                                 interpret this



                                                                 result as



                                                                 normal/abnormal

.

 

             RBC (test code =              See_Comment                [Automated



             789-8)                                              message] The sy

stem



                                                                 which generated



                                                                 this result



                                                                 transmitted



                                                                 reference range

:



                                                                 4.26 - 5.52



                                                                 10*6/?L. The



                                                                 reference range

 was



                                                                 not used to



                                                                 interpret this



                                                                 result as



                                                                 normal/abnormal

.

 

             HGB (test code = 14.4 g/dL    12.2-16.4                 



             718-7)                                              

 

             HCT (test code = 45.3 %       38.4-49.3                 



             4544-3)                                             

 

             MCV (test code = 97.6 fL      81.7-95.6    H            



             787-2)                                              

 

             MCH (test code = 31.0 pg      26.1-32.7                 



             785-6)                                              

 

             MCHC (test code = 31.8 g/dL    31.2-35.0                 



             786-4)                                              

 

             RDW-SD (test code = 46.4 fL      38.5-51.6                 



             44617-8)                                            

 

             RDW-CV (test code = 12.9 %       12.1-15.4                 



             788-0)                                              

 

             PLT (test code =              See_Comment                [Automated



             777-3)                                              message] The sy

stem



                                                                 which generated



                                                                 this result



                                                                 transmitted



                                                                 reference range

:



                                                                 150 - 328 10*3/

?L.



                                                                 The reference r

gwendolyn



                                                                 was not used to



                                                                 interpret this



                                                                 result as



                                                                 normal/abnormal

.

 

             MPV (test code = 11.7 fL      9.8-13.0                  



             57866-5)                                            

 

             NRBC/100 WBC (test              See_Comment                [Automat

ed



             code = [id number])                                        message] 

The system



                                                                 which generated



                                                                 this result



                                                                 transmitted



                                                                 reference range

:



                                                                 0.0 - 10.0 /100



                                                                 WBCs. The refer

ence



                                                                 range was not u

sed



                                                                 to interpret th

is



                                                                 result as



                                                                 normal/abnormal

.

 

             NRBC x10^3 (test code <0.01        See_Comment                [Auto

mated



             = [id number])                                        message] The s

ystem



                                                                 which generated



                                                                 this result



                                                                 transmitted



                                                                 reference range

:



                                                                 10*3/?L. The



                                                                 reference range

 was



                                                                 not used to



                                                                 interpret this



                                                                 result as



                                                                 normal/abnormal

.

 

             GRAN MAT (NEUT) % 68.0 %                                 



             (test code = 770-8)                                        

 

             IMM GRAN % (test code 0.50 %                                 



             = [id number])                                        

 

             LYMPH % (test code = 16.9 %                                 



             736-9)                                              

 

             MONO % (test code = 10.5 %                                 



             5905-5)                                             

 

             EOS % (test code = 3.4 %                                  



             713-8)                                              

 

             BASO % (test code = 0.7 %                                  



             706-2)                                              

 

             GRAN MAT x10^3(ANC) 2.98 10*3/uL 1.99-6.95                 



             (test code =                                        



             [id number])                                         

 

             IMM GRAN x10^3 (test <0.03        0.00-0.06                 



             code = [id number])                                        

 

             LYMPH x10^3 (test code 0.74 10*3/uL 1.09-3.23    L            



             = 731-0)                                            

 

             MONO x10^3 (test code 0.46 10*3/uL 0.36-1.02                 



             = 742-7)                                            

 

             EOS x10^3 (test code = 0.15 10*3/uL 0.06-0.53                 



             711-2)                                              

 

             BASO x10^3 (test code 0.03 10*3/uL 0.01-0.09                 



             = 704-7)                                            

 

             Lab Interpretation Abnormal                               



             (test code = 22057-5)                                        



[facility]